# Patient Record
Sex: FEMALE | Race: WHITE | Employment: FULL TIME | URBAN - NONMETROPOLITAN AREA
[De-identification: names, ages, dates, MRNs, and addresses within clinical notes are randomized per-mention and may not be internally consistent; named-entity substitution may affect disease eponyms.]

---

## 2017-01-10 ENCOUNTER — OFFICE VISIT (OUTPATIENT)
Dept: FAMILY MEDICINE CLINIC | Age: 32
End: 2017-01-10

## 2017-01-10 VITALS
DIASTOLIC BLOOD PRESSURE: 68 MMHG | SYSTOLIC BLOOD PRESSURE: 122 MMHG | HEIGHT: 67 IN | RESPIRATION RATE: 12 BRPM | TEMPERATURE: 98.3 F | HEART RATE: 88 BPM | BODY MASS INDEX: 29.19 KG/M2 | WEIGHT: 186 LBS

## 2017-01-10 DIAGNOSIS — G43.009 MIGRAINE WITHOUT AURA AND WITHOUT STATUS MIGRAINOSUS, NOT INTRACTABLE: Primary | ICD-10-CM

## 2017-01-10 DIAGNOSIS — M79.18 MUSCLE PAIN, LUMBAR: ICD-10-CM

## 2017-01-10 PROCEDURE — 99213 OFFICE O/P EST LOW 20 MIN: CPT | Performed by: NURSE PRACTITIONER

## 2017-01-10 RX ORDER — TOPIRAMATE 25 MG/1
25 TABLET ORAL 2 TIMES DAILY
Qty: 60 TABLET | Refills: 3 | Status: SHIPPED | OUTPATIENT
Start: 2017-01-10 | End: 2017-06-05 | Stop reason: SDUPTHER

## 2017-01-10 RX ORDER — IBUPROFEN 800 MG/1
800 TABLET ORAL 2 TIMES DAILY
Qty: 60 TABLET | Refills: 3 | Status: SHIPPED | OUTPATIENT
Start: 2017-01-10 | End: 2019-07-29 | Stop reason: ALTCHOICE

## 2017-01-10 ASSESSMENT — ENCOUNTER SYMPTOMS
SCALP TENDERNESS: 0
ABDOMINAL PAIN: 0
PHOTOPHOBIA: 1
ABDOMINAL DISTENTION: 0
NAUSEA: 0
SWOLLEN GLANDS: 0
BLURRED VISION: 0
EYE PAIN: 0

## 2017-02-13 ENCOUNTER — OFFICE VISIT (OUTPATIENT)
Dept: FAMILY MEDICINE CLINIC | Age: 32
End: 2017-02-13

## 2017-02-13 VITALS
BODY MASS INDEX: 28.34 KG/M2 | HEART RATE: 76 BPM | HEIGHT: 68 IN | SYSTOLIC BLOOD PRESSURE: 106 MMHG | TEMPERATURE: 98.2 F | WEIGHT: 187 LBS | DIASTOLIC BLOOD PRESSURE: 68 MMHG | RESPIRATION RATE: 12 BRPM

## 2017-02-13 DIAGNOSIS — J06.9 VIRAL UPPER RESPIRATORY TRACT INFECTION: Primary | ICD-10-CM

## 2017-02-13 DIAGNOSIS — G44.209 TENSION-TYPE HEADACHE, NOT INTRACTABLE, UNSPECIFIED CHRONICITY PATTERN: ICD-10-CM

## 2017-02-13 DIAGNOSIS — R05.9 COUGH: ICD-10-CM

## 2017-02-13 PROCEDURE — 99213 OFFICE O/P EST LOW 20 MIN: CPT | Performed by: NURSE PRACTITIONER

## 2017-02-13 RX ORDER — LORATADINE 10 MG/1
10 TABLET ORAL DAILY
Qty: 30 TABLET | Refills: 6 | Status: SHIPPED | OUTPATIENT
Start: 2017-02-13 | End: 2017-12-05 | Stop reason: SDUPTHER

## 2017-02-13 RX ORDER — GUAIFENESIN 600 MG/1
1200 TABLET, EXTENDED RELEASE ORAL 2 TIMES DAILY
Qty: 20 TABLET | Refills: 0 | Status: SHIPPED | OUTPATIENT
Start: 2017-02-13 | End: 2019-07-29 | Stop reason: ALTCHOICE

## 2017-02-13 RX ORDER — FLUTICASONE PROPIONATE 50 MCG
1 SPRAY, SUSPENSION (ML) NASAL DAILY
Qty: 1 BOTTLE | Refills: 3 | Status: SHIPPED | OUTPATIENT
Start: 2017-02-13 | End: 2017-12-05 | Stop reason: SDUPTHER

## 2017-06-05 ENCOUNTER — OFFICE VISIT (OUTPATIENT)
Dept: FAMILY MEDICINE CLINIC | Age: 32
End: 2017-06-05

## 2017-06-05 VITALS
TEMPERATURE: 98.4 F | BODY MASS INDEX: 27.47 KG/M2 | DIASTOLIC BLOOD PRESSURE: 72 MMHG | WEIGHT: 175 LBS | SYSTOLIC BLOOD PRESSURE: 120 MMHG | RESPIRATION RATE: 16 BRPM | HEART RATE: 80 BPM | HEIGHT: 67 IN

## 2017-06-05 DIAGNOSIS — K21.9 GASTROESOPHAGEAL REFLUX DISEASE, ESOPHAGITIS PRESENCE NOT SPECIFIED: ICD-10-CM

## 2017-06-05 DIAGNOSIS — Z72.0 TOBACCO ABUSE: Primary | ICD-10-CM

## 2017-06-05 DIAGNOSIS — G43.009 MIGRAINE WITHOUT AURA AND WITHOUT STATUS MIGRAINOSUS, NOT INTRACTABLE: ICD-10-CM

## 2017-06-05 DIAGNOSIS — S63.502A LEFT WRIST SPRAIN, INITIAL ENCOUNTER: ICD-10-CM

## 2017-06-05 PROCEDURE — 99213 OFFICE O/P EST LOW 20 MIN: CPT | Performed by: NURSE PRACTITIONER

## 2017-06-05 RX ORDER — TOPIRAMATE 25 MG/1
25 TABLET ORAL DAILY
Qty: 30 TABLET | Refills: 6 | Status: SHIPPED | OUTPATIENT
Start: 2017-06-05 | End: 2017-11-09 | Stop reason: SDUPTHER

## 2017-06-05 RX ORDER — VARENICLINE TARTRATE 25 MG
KIT ORAL
Qty: 1 EACH | Refills: 0 | Status: SHIPPED | OUTPATIENT
Start: 2017-06-05 | End: 2018-03-15 | Stop reason: ALTCHOICE

## 2017-06-05 ASSESSMENT — PATIENT HEALTH QUESTIONNAIRE - PHQ9
SUM OF ALL RESPONSES TO PHQ9 QUESTIONS 1 & 2: 0
1. LITTLE INTEREST OR PLEASURE IN DOING THINGS: 0
SUM OF ALL RESPONSES TO PHQ QUESTIONS 1-9: 0
2. FEELING DOWN, DEPRESSED OR HOPELESS: 0

## 2017-06-05 ASSESSMENT — ENCOUNTER SYMPTOMS
RESPIRATORY NEGATIVE: 1
RHINORRHEA: 1
PHOTOPHOBIA: 0

## 2017-11-06 ENCOUNTER — TELEPHONE (OUTPATIENT)
Dept: FAMILY MEDICINE CLINIC | Age: 32
End: 2017-11-06

## 2017-11-06 ENCOUNTER — OFFICE VISIT (OUTPATIENT)
Dept: FAMILY MEDICINE CLINIC | Age: 32
End: 2017-11-06
Payer: COMMERCIAL

## 2017-11-06 VITALS
HEART RATE: 86 BPM | RESPIRATION RATE: 12 BRPM | TEMPERATURE: 98 F | BODY MASS INDEX: 26.84 KG/M2 | HEIGHT: 67 IN | WEIGHT: 171 LBS | SYSTOLIC BLOOD PRESSURE: 100 MMHG | DIASTOLIC BLOOD PRESSURE: 62 MMHG

## 2017-11-06 DIAGNOSIS — M54.2 NECK PAIN: ICD-10-CM

## 2017-11-06 DIAGNOSIS — G43.119 INTRACTABLE MIGRAINE WITH AURA WITHOUT STATUS MIGRAINOSUS: Primary | ICD-10-CM

## 2017-11-06 DIAGNOSIS — R11.0 NAUSEA: ICD-10-CM

## 2017-11-06 PROCEDURE — G8484 FLU IMMUNIZE NO ADMIN: HCPCS | Performed by: NURSE PRACTITIONER

## 2017-11-06 PROCEDURE — 99213 OFFICE O/P EST LOW 20 MIN: CPT | Performed by: NURSE PRACTITIONER

## 2017-11-06 PROCEDURE — G8427 DOCREV CUR MEDS BY ELIG CLIN: HCPCS | Performed by: NURSE PRACTITIONER

## 2017-11-06 PROCEDURE — 4004F PT TOBACCO SCREEN RCVD TLK: CPT | Performed by: NURSE PRACTITIONER

## 2017-11-06 PROCEDURE — G8417 CALC BMI ABV UP PARAM F/U: HCPCS | Performed by: NURSE PRACTITIONER

## 2017-11-06 RX ORDER — KETOROLAC TROMETHAMINE 30 MG/ML
30 INJECTION, SOLUTION INTRAMUSCULAR; INTRAVENOUS ONCE
Status: COMPLETED | OUTPATIENT
Start: 2017-11-06 | End: 2017-11-06

## 2017-11-06 RX ORDER — ONDANSETRON 4 MG/1
4 TABLET, FILM COATED ORAL EVERY 8 HOURS PRN
Qty: 21 TABLET | Refills: 0 | Status: SHIPPED | OUTPATIENT
Start: 2017-11-06 | End: 2019-07-12

## 2017-11-06 RX ADMIN — KETOROLAC TROMETHAMINE 30 MG: 30 INJECTION, SOLUTION INTRAMUSCULAR; INTRAVENOUS at 13:42

## 2017-11-06 NOTE — PROGRESS NOTES
Visit Information    Have you changed or started any medications since your last visit including any over-the-counter medicines, vitamins, or herbal medicines? no   Are you having any side effects from any of your medications? -  no  Have you stopped taking any of your medications? Is so, why? -  no    Have you seen any other physician or provider since your last visit? No  Have you had any other diagnostic tests since your last visit? No  Have you been seen in the emergency room and/or had an admission to a hospital since we last saw you? No  Have you had your routine dental cleaning in the past 6 months? yes    Have you activated your [employer] account? If not, what are your barriers?  Yes     Patient Care Team:  Rima Daniel CNP as PCP - General (Family Nurse Practitioner)    Medical History Review  Past Medical, Family, and Social History reviewed and does not contribute to the patient presenting condition    Health Maintenance   Topic Date Due    Pneumococcal med risk (1 of 1 - PPSV23) 05/21/2005    Flu vaccine (1) 02/13/2018 (Originally 9/1/2017)    Cervical cancer screen  02/24/2019    DTaP/Tdap/Td vaccine (2 - Td) 04/28/2026    HIV screen  Completed
Return if symptoms worsen or fail to improve. Patient instructed to go to nearest ED if symptoms worsen or she develops fever, severe neck stiffness, extremity weakness, or change in mental status.

## 2017-11-06 NOTE — LETTER
[address]  [address] [address]  Phone: [phone]  Fax: [phone]    Ekaterina Tony CNP        November 6, 2017     Patient: Santhosh Cerda   YOB: 1985   Date of Visit: 11/6/2017       To Whom It May Concern: It is my medical opinion that Ren Riley may return to full duty immediately with no restrictions. If you have any questions or concerns, please don't hesitate to call.     Sincerely,        Ekaterina Tony CNP

## 2017-11-09 ENCOUNTER — TELEPHONE (OUTPATIENT)
Dept: FAMILY MEDICINE CLINIC | Age: 32
End: 2017-11-09

## 2017-11-09 ENCOUNTER — OFFICE VISIT (OUTPATIENT)
Dept: FAMILY MEDICINE CLINIC | Age: 32
End: 2017-11-09
Payer: COMMERCIAL

## 2017-11-09 VITALS
RESPIRATION RATE: 20 BRPM | WEIGHT: 176 LBS | TEMPERATURE: 97.9 F | HEIGHT: 67 IN | BODY MASS INDEX: 27.62 KG/M2 | DIASTOLIC BLOOD PRESSURE: 62 MMHG | HEART RATE: 60 BPM | SYSTOLIC BLOOD PRESSURE: 102 MMHG

## 2017-11-09 DIAGNOSIS — G43.009 MIGRAINE WITHOUT AURA AND WITHOUT STATUS MIGRAINOSUS, NOT INTRACTABLE: ICD-10-CM

## 2017-11-09 PROCEDURE — G8427 DOCREV CUR MEDS BY ELIG CLIN: HCPCS | Performed by: NURSE PRACTITIONER

## 2017-11-09 PROCEDURE — G8484 FLU IMMUNIZE NO ADMIN: HCPCS | Performed by: NURSE PRACTITIONER

## 2017-11-09 PROCEDURE — G8417 CALC BMI ABV UP PARAM F/U: HCPCS | Performed by: NURSE PRACTITIONER

## 2017-11-09 PROCEDURE — 99213 OFFICE O/P EST LOW 20 MIN: CPT | Performed by: NURSE PRACTITIONER

## 2017-11-09 PROCEDURE — 4004F PT TOBACCO SCREEN RCVD TLK: CPT | Performed by: NURSE PRACTITIONER

## 2017-11-09 RX ORDER — KETOROLAC TROMETHAMINE 30 MG/ML
60 INJECTION, SOLUTION INTRAMUSCULAR; INTRAVENOUS ONCE
Status: COMPLETED | OUTPATIENT
Start: 2017-11-09 | End: 2017-11-09

## 2017-11-09 RX ORDER — TOPIRAMATE 25 MG/1
25 TABLET ORAL 2 TIMES DAILY
Qty: 60 TABLET | Refills: 3 | Status: SHIPPED | OUTPATIENT
Start: 2017-11-09 | End: 2018-07-11 | Stop reason: ALTCHOICE

## 2017-11-09 RX ORDER — PROMETHAZINE HYDROCHLORIDE 50 MG/ML
25 INJECTION, SOLUTION INTRAMUSCULAR; INTRAVENOUS ONCE
Status: COMPLETED | OUTPATIENT
Start: 2017-11-09 | End: 2017-11-09

## 2017-11-09 RX ORDER — METHYLPREDNISOLONE ACETATE 40 MG/ML
40 INJECTION, SUSPENSION INTRA-ARTICULAR; INTRALESIONAL; INTRAMUSCULAR; SOFT TISSUE ONCE
Status: COMPLETED | OUTPATIENT
Start: 2017-11-09 | End: 2017-11-09

## 2017-11-09 RX ADMIN — PROMETHAZINE HYDROCHLORIDE 25 MG: 50 INJECTION, SOLUTION INTRAMUSCULAR; INTRAVENOUS at 18:45

## 2017-11-09 RX ADMIN — METHYLPREDNISOLONE ACETATE 40 MG: 40 INJECTION, SUSPENSION INTRA-ARTICULAR; INTRALESIONAL; INTRAMUSCULAR; SOFT TISSUE at 18:42

## 2017-11-09 RX ADMIN — KETOROLAC TROMETHAMINE 60 MG: 30 INJECTION, SOLUTION INTRAMUSCULAR; INTRAVENOUS at 18:43

## 2017-11-09 NOTE — PROGRESS NOTES
Visit Information    Have you changed or started any medications since your last visit including any over-the-counter medicines, vitamins, or herbal medicines? no   Are you having any side effects from any of your medications? -  no  Have you stopped taking any of your medications? Is so, why? -  no    Have you seen any other physician or provider since your last visit? No  Have you had any other diagnostic tests since your last visit? No  Have you been seen in the emergency room and/or had an admission to a hospital since we last saw you? No  Have you had your routine dental cleaning in the past 6 months? no    Have you activated your [employer] account? If not, what are your barriers?  Yes     Patient Care Team:  Chrystie Harada, CNP as PCP - [facility] Nurse Practitioner)      Health Maintenance   Topic Date Due    Pneumococcal med risk (1 of 1 - PPSV23) 05/21/2005    Flu vaccine (1) 02/13/2018 (Originally 9/1/2017)    Cervical cancer screen  02/24/2019    DTaP/Tdap/Td vaccine (2 - Td) 04/28/2026    HIV screen  Completed

## 2017-11-09 NOTE — PROGRESS NOTES
Dalila Lindsay is a 33 y.o. female that presents for Headache (pt states this started a week ago, has been taking IBU and topomax, these are not helping. )      Headache    HPI:  + photophobia and phonophobia  Location - all over her head, throbbing  Duration 1.5 weeks  Inciting event? - No  Severity  8/10  History of migraines? -  Yes - and this is similar episode of migraine  Treatment tried and response - Toradol, Topamax, has gone to the chiropractor    Gets a migraine maybe 1 every few months    Fever over 100.5 No  Neck stiffness  No  Weakness of arm/leg  No  Nausea/vomiting - Yes - nausea but no vomiting  \"Worst headache ever\" - Yes  Confusion - No    PHYSICAL EXAM:    Vitals:    11/09/17 1753   BP: 102/62   Pulse: 60   Resp: 20   Temp: 97.9 °F (36.6 °C)     GEN:  NAD  HEENT:  NCAT, PERRL, EOMI, Nares clear, turbinates pink, mucosa is moist.  Oropharynx  is clear. Hearing grossly intact. Dentition is normal for age. Neck: No lymphadenopathy or masses. No neck stiffness noted   Heart: RRR. S1 and S2 normal, no murmurs, clicks, gallops or rubs. Lungs:  CTAB,  . Abdomen:  Soft, non tender, non distended. No rebound or guarding. No organomegaly. Extremities:  No gross deformity, erythema or edema of the lower extremities. Skin: No pathologic lesions or significant rash. Neurological:  A&Ox3. CN 2-12 grossly intact. 5/5 strength globally and symmetrically. Cerebellar testing wnl. 2+ patellar reflexes b/l    ASSESSMENT & PLAN  [facility] was seen today for headache. Diagnoses and all orders for this visit:    Migraine without aura and without status migrainosus, not intractable  -     topiramate (TOPAMAX) 25 MG tablet;  Take 1 tablet by mouth 2 times daily  -     methylPREDNISolone acetate (DEPO-MEDROL) injection 40 mg; Inject 1 mL into the muscle once  -     ketorolac (TORADOL) injection 60 mg; Inject 2 mLs into the muscle once  -     promethazine (PHENERGAN) injection 25 mg; Inject 0.5 mLs into the muscle once    will increase Topamax to 25 mg bid for starters, may benefit from further increase  Migraine cocktail  ER precautions if her headache persists and/or worsens  Cautioned about driving, medication SE advised    Return if symptoms worsen or fail to improve. Patient instructed to go to nearest ED if symptoms worsen or she develops fever, severe neck stiffness, extremity weakness, or change in mental status.

## 2017-11-09 NOTE — PROGRESS NOTES
After obtaining consent, and per orders of Aravind Malik, injection of Phenergan 25 mg I.M. given in left Buttocks by Jose Francisco Villela LPN. Patient instructed to report any adverse reaction to me immediately.

## 2017-11-09 NOTE — PROGRESS NOTES
After obtaining consent, and per orders of [facility] , injection of Depo medrol 40 mg I.M. given in right buttocks  by Andrew Xiao LPN. Patient instructed to report any adverse reaction to me immediately. Dayna Merlos

## 2017-12-05 ENCOUNTER — OFFICE VISIT (OUTPATIENT)
Dept: FAMILY MEDICINE CLINIC | Age: 32
End: 2017-12-05
Payer: COMMERCIAL

## 2017-12-05 VITALS
HEIGHT: 67 IN | WEIGHT: 178.8 LBS | TEMPERATURE: 97.4 F | RESPIRATION RATE: 12 BRPM | DIASTOLIC BLOOD PRESSURE: 62 MMHG | SYSTOLIC BLOOD PRESSURE: 110 MMHG | HEART RATE: 92 BPM | BODY MASS INDEX: 28.06 KG/M2

## 2017-12-05 DIAGNOSIS — J06.9 VIRAL UPPER RESPIRATORY TRACT INFECTION: ICD-10-CM

## 2017-12-05 DIAGNOSIS — G43.809 OTHER MIGRAINE WITHOUT STATUS MIGRAINOSUS, NOT INTRACTABLE: Primary | ICD-10-CM

## 2017-12-05 DIAGNOSIS — R05.9 COUGH: ICD-10-CM

## 2017-12-05 PROCEDURE — G8427 DOCREV CUR MEDS BY ELIG CLIN: HCPCS | Performed by: NURSE PRACTITIONER

## 2017-12-05 PROCEDURE — 99213 OFFICE O/P EST LOW 20 MIN: CPT | Performed by: NURSE PRACTITIONER

## 2017-12-05 PROCEDURE — 4004F PT TOBACCO SCREEN RCVD TLK: CPT | Performed by: NURSE PRACTITIONER

## 2017-12-05 PROCEDURE — G8484 FLU IMMUNIZE NO ADMIN: HCPCS | Performed by: NURSE PRACTITIONER

## 2017-12-05 PROCEDURE — G8417 CALC BMI ABV UP PARAM F/U: HCPCS | Performed by: NURSE PRACTITIONER

## 2017-12-05 RX ORDER — LORATADINE 10 MG/1
10 TABLET ORAL DAILY
Qty: 30 TABLET | Refills: 6 | Status: SHIPPED | OUTPATIENT
Start: 2017-12-05 | End: 2020-02-13 | Stop reason: ALTCHOICE

## 2017-12-05 RX ORDER — FLUTICASONE PROPIONATE 50 MCG
1 SPRAY, SUSPENSION (ML) NASAL DAILY
Qty: 1 BOTTLE | Refills: 3 | Status: SHIPPED | OUTPATIENT
Start: 2017-12-05 | End: 2019-07-12

## 2017-12-05 ASSESSMENT — ENCOUNTER SYMPTOMS
COUGH: 1
CHOKING: 0
SHORTNESS OF BREATH: 0
CHEST TIGHTNESS: 0
WHEEZING: 0
RHINORRHEA: 1

## 2017-12-05 NOTE — PROGRESS NOTES
[address]  [address] [address]  Dept: [phone]  Dept Fax: [phone]  Loc: Jihan Bosch is a 33 y.o. female who presents today for her medical conditions/complaints as noted below. Joce Merida is c/o of 6 Month Follow-Up and Medication Refill      HPI:     Pt here for a 6 month check up. She states her migraine headaches are under control. She is taking topamax bid and hs no suzy a headache since earlier this month. She denies dizziness or vision changes. She has an upper respiratory infection with nasal congestion and drainage she takes claritin and flonass for these symptoms and  They work well. She started the chantix but stopped it after 4-5 weeks. She does continue to smoke  But is going to stop on her own.          Current Outpatient Prescriptions   Medication Sig Dispense Refill    loratadine (CLARITIN) 10 MG tablet Take 1 tablet by mouth daily 30 tablet 6    fluticasone (FLONASE) 50 MCG/ACT nasal spray 1 spray by Nasal route daily 1 Bottle 3    topiramate (TOPAMAX) 25 MG tablet Take 1 tablet by mouth 2 times daily 60 tablet 3    ondansetron (ZOFRAN) 4 MG tablet Take 1 tablet by mouth every 8 hours as needed for Nausea or Vomiting 21 tablet 0    Elastic Bandages & Supports (ACE WRIST BRACE/SPLINT) MISC 1 Device by Does not apply route continuous prn (prn comfort) 1 each 0    guaiFENesin (MUCINEX) 600 MG extended release tablet Take 2 tablets by mouth 2 times daily 20 tablet 0    ibuprofen (ADVIL;MOTRIN) 800 MG tablet Take 1 tablet by mouth 2 times daily 60 tablet 3    ondansetron (ZOFRAN) 4 MG tablet Take 1 tablet by mouth every 8 hours as needed for Nausea or Vomiting 21 tablet 0    ibuprofen (ADVIL;MOTRIN) 800 MG tablet Take 1 tablet by mouth every 6 hours as needed for Pain 120 tablet 0    melatonin 3 MG TABS tablet Take 3 mg by mouth daily      varenicline (CHANTIX STARTING MONTH PAK) 0.5 MG X 11 & 1 MG X 42 tablet Take by mouth. 1 each 0     No current facility-administered medications for this visit. Past Medical History:   Diagnosis Date    Abnormal Pap smear and cervical HPV (human papillomavirus)     Anxiety     Asthma     Depression     Smoker     Substance abuse     history of    Tonsillitis     recurrent      Past Surgical History:   Procedure Laterality Date    DENTAL SURGERY      FOOT SURGERY Right 08/26/14    TUBAL LIGATION  1/2012     Family History   Problem Relation Age of Onset    Cancer Mother      ovarian in mother  , paternal grandmother- breast cancer    Alcohol Abuse Other       family     Social History   Substance Use Topics    Smoking status: Current Every Day Smoker     Packs/day: 0.50     Years: 10.00     Types: Cigarettes     Last attempt to quit: 12/15/2015    Smokeless tobacco: Never Used    Alcohol use 0.6 oz/week     1 Glasses of wine per week        Allergies   Allergen Reactions    Augmentin [Amoxicillin-Pot Clavulanate] Swelling    Bee Venom Hives    Bee Pollen Swelling and Rash       Health Maintenance   Topic Date Due    Pneumococcal med risk (1 of 1 - PPSV23) 05/21/2005    Flu vaccine (1) 02/13/2018 (Originally 9/1/2017)    Cervical cancer screen  02/24/2019    DTaP/Tdap/Td vaccine (2 - Td) 04/28/2026    HIV screen  Completed       Subjective:      Review of Systems   Constitutional: Negative for chills, fatigue and fever. HENT: Positive for congestion and rhinorrhea. Respiratory: Positive for cough. Negative for choking, chest tightness, shortness of breath and wheezing. Cardiovascular: Negative. Genitourinary: Negative for difficulty urinating and dysuria. Skin: Negative. Neurological: Negative for dizziness, weakness and headaches. Psychiatric/Behavioral: Negative for self-injury, sleep disturbance and suicidal ideas.        Objective:     /62 (Site: Left Arm, Position: Sitting, Cuff Size: Medium Adult)

## 2018-03-15 ENCOUNTER — HOSPITAL ENCOUNTER (EMERGENCY)
Age: 33
Discharge: HOME | End: 2018-03-15
Payer: COMMERCIAL

## 2018-03-15 VITALS
SYSTOLIC BLOOD PRESSURE: 114 MMHG | WEIGHT: 181 LBS | OXYGEN SATURATION: 97 % | BODY MASS INDEX: 28.41 KG/M2 | HEIGHT: 67 IN | HEART RATE: 78 BPM | RESPIRATION RATE: 16 BRPM | DIASTOLIC BLOOD PRESSURE: 80 MMHG | TEMPERATURE: 98 F

## 2018-03-15 DIAGNOSIS — J06.9 VIRAL URI WITH COUGH: Primary | ICD-10-CM

## 2018-03-15 LAB
FLU A ANTIGEN: NEGATIVE
FLU B ANTIGEN: NEGATIVE

## 2018-03-15 PROCEDURE — 99213 OFFICE O/P EST LOW 20 MIN: CPT | Performed by: NURSE PRACTITIONER

## 2018-03-15 PROCEDURE — 87804 INFLUENZA ASSAY W/OPTIC: CPT

## 2018-03-15 PROCEDURE — 99213 OFFICE O/P EST LOW 20 MIN: CPT

## 2018-03-15 RX ORDER — BENZONATATE 100 MG/1
100 CAPSULE ORAL 3 TIMES DAILY PRN
Qty: 21 CAPSULE | Refills: 0 | Status: SHIPPED | OUTPATIENT
Start: 2018-03-15 | End: 2018-03-22

## 2018-03-15 ASSESSMENT — ENCOUNTER SYMPTOMS
WHEEZING: 0
SORE THROAT: 1
ABDOMINAL PAIN: 0
NAUSEA: 0
VOMITING: 0
COUGH: 1
APNEA: 0
BACK PAIN: 0
RHINORRHEA: 1
PHOTOPHOBIA: 0
DIARRHEA: 0
SINUS PRESSURE: 0
SHORTNESS OF BREATH: 0
CONSTIPATION: 0

## 2018-03-15 NOTE — ED PROVIDER NOTES
[id number]  Urgent Care Encounter      CHIEF COMPLAINT       Chief Complaint   Patient presents with    Sinusitis     sore throat       Nurses Notes reviewed and I agree except as noted in the HPI. HISTORY OF PRESENT Hero Prasad is a 33 y.o. female who presents with cough, sore throat, and nasal congestion. This has been going on for two days. She states that her daughter tested positive for Influenza B 2 days ago and she wanted to be sure that she does not have it as well. Requesting a flu test.  No fever or chills. States she has allergies and it may be this and not flu. Cough is non-productive and makes it difficult to sleep. REVIEW OF SYSTEMS     Review of Systems   Constitutional: Negative for appetite change, chills, fatigue and fever. HENT: Positive for congestion, rhinorrhea and sore throat. Negative for ear pain and sinus pressure. Eyes: Negative for photophobia. Respiratory: Positive for cough. Negative for apnea, shortness of breath and wheezing. Cardiovascular: Negative for chest pain and leg swelling. Gastrointestinal: Negative for abdominal pain, constipation, diarrhea, nausea and vomiting. Genitourinary: Negative for difficulty urinating. Musculoskeletal: Negative for back pain and neck stiffness. Neurological: Negative for dizziness, weakness and light-headedness. PAST MEDICAL HISTORY         Diagnosis Date    Abnormal Pap smear and cervical HPV (human papillomavirus)     Anxiety     Asthma     Depression     Smoker     Substance abuse     history of    Tonsillitis     recurrent       SURGICAL HISTORY     Patient  has a past surgical history that includes Dental surgery; Tubal ligation (1/2012); and Foot surgery (Right, 08/26/14).     CURRENT MEDICATIONS       Discharge Medication List as of 3/15/2018  1:12 PM      CONTINUE these medications which have NOT CHANGED    Details   loratadine (CLARITIN) 10 MG tablet Take 1

## 2018-05-21 ENCOUNTER — TELEPHONE (OUTPATIENT)
Dept: FAMILY MEDICINE CLINIC | Age: 33
End: 2018-05-21

## 2018-07-11 ENCOUNTER — OFFICE VISIT (OUTPATIENT)
Dept: FAMILY MEDICINE CLINIC | Age: 33
End: 2018-07-11
Payer: COMMERCIAL

## 2018-07-11 ENCOUNTER — TELEPHONE (OUTPATIENT)
Dept: FAMILY MEDICINE CLINIC | Age: 33
End: 2018-07-11

## 2018-07-11 VITALS
SYSTOLIC BLOOD PRESSURE: 104 MMHG | WEIGHT: 179.6 LBS | HEIGHT: 67 IN | DIASTOLIC BLOOD PRESSURE: 66 MMHG | TEMPERATURE: 98.3 F | RESPIRATION RATE: 16 BRPM | HEART RATE: 88 BPM | BODY MASS INDEX: 28.19 KG/M2

## 2018-07-11 DIAGNOSIS — Z71.6 ENCOUNTER FOR TOBACCO USE CESSATION COUNSELING: Primary | ICD-10-CM

## 2018-07-11 DIAGNOSIS — F33.41 RECURRENT MAJOR DEPRESSIVE DISORDER, IN PARTIAL REMISSION (HCC): Primary | ICD-10-CM

## 2018-07-11 DIAGNOSIS — R45.4 ANGER: ICD-10-CM

## 2018-07-11 DIAGNOSIS — Z72.0 TOBACCO ABUSE: ICD-10-CM

## 2018-07-11 PROCEDURE — G8417 CALC BMI ABV UP PARAM F/U: HCPCS | Performed by: NURSE PRACTITIONER

## 2018-07-11 PROCEDURE — 99213 OFFICE O/P EST LOW 20 MIN: CPT | Performed by: NURSE PRACTITIONER

## 2018-07-11 PROCEDURE — G8427 DOCREV CUR MEDS BY ELIG CLIN: HCPCS | Performed by: NURSE PRACTITIONER

## 2018-07-11 PROCEDURE — 4004F PT TOBACCO SCREEN RCVD TLK: CPT | Performed by: NURSE PRACTITIONER

## 2018-07-11 PROCEDURE — 96160 PT-FOCUSED HLTH RISK ASSMT: CPT | Performed by: NURSE PRACTITIONER

## 2018-07-11 RX ORDER — AMITRIPTYLINE HYDROCHLORIDE 10 MG/1
10 TABLET, FILM COATED ORAL NIGHTLY
COMMUNITY
End: 2020-02-13 | Stop reason: ALTCHOICE

## 2018-07-11 RX ORDER — ESCITALOPRAM OXALATE 10 MG/1
10 TABLET ORAL DAILY
Qty: 30 TABLET | Refills: 3 | Status: SHIPPED | OUTPATIENT
Start: 2018-07-11 | End: 2018-08-29 | Stop reason: ALTCHOICE

## 2018-07-11 RX ORDER — BUPROPION HYDROCHLORIDE 150 MG/1
150 TABLET ORAL EVERY MORNING
Qty: 30 TABLET | Refills: 3 | Status: SHIPPED | OUTPATIENT
Start: 2018-07-11 | End: 2018-07-12 | Stop reason: SDUPTHER

## 2018-07-11 RX ORDER — IBUPROFEN 800 MG/1
800 TABLET ORAL EVERY 6 HOURS PRN
Qty: 120 TABLET | Refills: 0 | Status: ON HOLD | OUTPATIENT
Start: 2018-07-11 | End: 2019-08-06 | Stop reason: HOSPADM

## 2018-07-11 RX ORDER — NORGESTIMATE AND ETHINYL ESTRADIOL 0.25-0.035
1 KIT ORAL DAILY
COMMUNITY
End: 2019-07-12

## 2018-07-11 ASSESSMENT — PATIENT HEALTH QUESTIONNAIRE - PHQ9
SUM OF ALL RESPONSES TO PHQ9 QUESTIONS 1 & 2: 6
1. LITTLE INTEREST OR PLEASURE IN DOING THINGS: 3
2. FEELING DOWN, DEPRESSED OR HOPELESS: 3
4. FEELING TIRED OR HAVING LITTLE ENERGY: 3
10. IF YOU CHECKED OFF ANY PROBLEMS, HOW DIFFICULT HAVE THESE PROBLEMS MADE IT FOR YOU TO DO YOUR WORK, TAKE CARE OF THINGS AT HOME, OR GET ALONG WITH OTHER PEOPLE: 3
7. TROUBLE CONCENTRATING ON THINGS, SUCH AS READING THE NEWSPAPER OR WATCHING TELEVISION: 0
9. THOUGHTS THAT YOU WOULD BE BETTER OFF DEAD, OR OF HURTING YOURSELF: 0
6. FEELING BAD ABOUT YOURSELF - OR THAT YOU ARE A FAILURE OR HAVE LET YOURSELF OR YOUR FAMILY DOWN: 2
5. POOR APPETITE OR OVEREATING: 3
8. MOVING OR SPEAKING SO SLOWLY THAT OTHER PEOPLE COULD HAVE NOTICED. OR THE OPPOSITE, BEING SO FIGETY OR RESTLESS THAT YOU HAVE BEEN MOVING AROUND A LOT MORE THAN USUAL: 0
SUM OF ALL RESPONSES TO PHQ QUESTIONS 1-9: 17
3. TROUBLE FALLING OR STAYING ASLEEP: 3

## 2018-07-11 NOTE — TELEPHONE ENCOUNTER
Received P.A. Today for buPROPion (WELLBUTRIN XL) 150 MG extended release tablet    Not  Sure what ICD 10  From office visit today that we are using for the RX ? No ICD 10 on the RX . Please verify     P. A paper in phone 2 tray

## 2018-07-12 RX ORDER — BUPROPION HYDROCHLORIDE 150 MG/1
150 TABLET ORAL EVERY MORNING
Qty: 30 TABLET | Refills: 3 | Status: SHIPPED | OUTPATIENT
Start: 2018-07-12 | End: 2018-09-24

## 2018-07-12 ASSESSMENT — ENCOUNTER SYMPTOMS: RESPIRATORY NEGATIVE: 1

## 2018-07-12 NOTE — PROGRESS NOTES
[address] [place] [address]  Dept: [phone]  Dept Fax: [phone]  Loc: Jaz Momin is a 29 y.o. female who presents today for her medical conditions/complaints as noted below. Amy Funez is c/o of Depression (Pt is c/o problems with depression and anger which she said has had for a long time, but she has noticed it has gotted worse as she has had added anxiety and stress recently. )      HPI:     Pt here to discuss her moods. She has battled with depression for many years. She has tried medications off and on. She notes that lately her  states her moods have been getting worse, she is lashing out verbally, she is always angry. Pt states she did not realize this, but does note she has had a lack of motivation and energy lately. She would prefer to sit on her cough all day and watch TV. She did quit her job last year as it was too stressful. She denies panic attacks. She denies thoughts of hurting herself or others. She states she was on zoloft once and became angrier. She has been place don elavil recently per gyn for bladder pains. She is smoking 4 cigarettes per day and would like to stop, the patches do not work they give her a rash.          Current Outpatient Prescriptions   Medication Sig Dispense Refill    ibuprofen (ADVIL;MOTRIN) 800 MG tablet Take 1 tablet by mouth every 6 hours as needed for Pain 120 tablet 0    amitriptyline (ELAVIL) 10 MG tablet Take 10 mg by mouth nightly      lidocaine viscous (XYLOCAINE) 2 % solution Take 15 mLs by mouth as needed for Irritation      norgestimate-ethinyl estradiol (MONO-LINYAH) 0.25-35 MG-MCG per tablet Take 1 tablet by mouth daily      escitalopram (LEXAPRO) 10 MG tablet Take 1 tablet by mouth daily 30 tablet 3    loratadine (CLARITIN) 10 MG tablet Take 1 tablet by mouth daily 30 tablet 6    fluticasone (FLONASE) 50 MCG/ACT nasal spray 1

## 2018-07-12 NOTE — TELEPHONE ENCOUNTER
[facility] calling to inform office pa is not necessarily for med itself but for the fact that the ins only pays for 2 psych meds per month and this would have her at more than that.

## 2018-07-13 ENCOUNTER — TELEPHONE (OUTPATIENT)
Dept: FAMILY MEDICINE CLINIC | Age: 33
End: 2018-07-13

## 2018-07-13 DIAGNOSIS — Z71.6 ENCOUNTER FOR TOBACCO USE CESSATION COUNSELING: Primary | ICD-10-CM

## 2018-07-13 NOTE — TELEPHONE ENCOUNTER
Please let the pt know this was denied and ask if she is open to any other medications for smoking cessation. She is not currently a candidate for chantix, has she ever tried the gum?

## 2018-08-14 ENCOUNTER — OFFICE VISIT (OUTPATIENT)
Dept: PSYCHOLOGY | Age: 33
End: 2018-08-14
Payer: COMMERCIAL

## 2018-08-14 DIAGNOSIS — F33.1 MAJOR DEPRESSIVE DISORDER, RECURRENT EPISODE, MODERATE WITH ANXIOUS DISTRESS (HCC): Primary | ICD-10-CM

## 2018-08-14 PROCEDURE — 90791 PSYCH DIAGNOSTIC EVALUATION: CPT | Performed by: PSYCHOLOGIST

## 2018-08-14 NOTE — PATIENT INSTRUCTIONS
1.  Schedule two potentially enjoyable activities in the next couple of weeks (some examples):    -Going out to eat  -Going to see a movie  -Spend time with family/friends  -Read  -Get to the gym  -Get outside to do some walking      2. Some tips about overcoming depression are presented below. 3.  I would recommend getting into a sleep schedule where you go to bed and get up about the same time each day. 4.  You've got to focus on self-care (i.e., sleep, activities you enjoy, exercise, etc.) so that you can continue to take care of others. 5.  Try the Stop, Breathe & Think giancarlo to help slow life down for you. 6.  Return to see Dr. Andrei Paulino in 4-6 weeks. OVERCOMING DEPRESSION    This booklet is designed to provide information about strategies for overcoming depression. It discusses a model or framework for understanding depression (the depression spiral) and presents an overview of treatment of depression, including use of medication and strategic coping strategies. The booklet is designed to be used with the assistance of your Behavioral Health Consultant. The Depression Spiral    The figure below depicts one helpful way to think about and understand depression. Our life experience (including depression) is influenced by a number of interrelated factors: our environment, biological factors, our thoughts and beliefs, our behaviors, and our emotions. Each factor can affect the others. For example, Jeannie Marcos recently began working in a fast-paced, high-pressure job (environmental factor). She began to have thoughts such as Theres no way I can get all this work done. Its impossible. If I dont get it done, I may lose my job.   As a result, she began to work longer hours, cut out all fun activities, and withdraw from family and friends (behaviors).   With this decrease in many of the positive, rewarding aspects of her life, she began to feel down, depressed, and more irritable behavioral and biological factor that may help decrease depression), recognizing and changing thought patterns that contribute to depression or worry (thoughts), or learning and trying out new behaviors to improve work or family situations (behaviors, e.g., problem-solving, effective communication, time management, etc.). As you can see, there are a variety of coping methods and behavioral strategies that may be helpful in decreasing depression. It is probably not in your best interests to try all or too many strategies at any one time. Rather, keep it simple and do not overwhelm yourself. It is usually best to pick one or two strategies that sound most relevant to you, try those coping strategies for a few weeks or longer, and then move on to other coping strategies that you think may be important later on. And remember -- even if you are just working directly on one or two coping strategies, you will probably be having an indirect positive effect on other areas. Your Behavioral Health Consultant [facility]) can work with you to develop skills in some of the most effective strategies for breaking the depression spiral and decreasing depression. Four effective strategies include:    __X___  a. Increasing rewarding activities    __X___  b. Taking antidepressant medication as directed by PCP     __X___  c. Increasing physical exercise     __X___  d. Increasing balanced thinking     Talk with your [facility] about which of the above you are interested in working on to better manage your depression. Start small and stay focused  The key to depression recovery is to start with a few small goals and slowly build from there. Draw upon whatever resources you have. You may not have much energy, but you probably have enough to take a short walk around the block or  the phone to call a loved one. Take things one day at a time and reward yourself for each accomplishment.  The steps may seem small, but theyll quickly add up. And for all the energy you put into your depression recovery, youll get back much more in return. Depression self-help tip 1: Cultivate supportive relationships  Getting the support you need plays a big role in lifting the fog of depression and keeping it away. On your own, it can be difficult to maintain perspective and sustain the effort required to beat depression, but the very nature of depression makes it difficult to reach out for help. While isolation and loneliness can trigger or worsen depression, maintaining emotionally close relationships can be instrumental in overcoming it. The thought of reaching out to even close family members and friends can seem overwhelming. You may feel ashamed, too exhausted to talk, or guilty for neglecting the relationship. Remind yourself that this is the depression talking. Reaching out is not a sign of weakness and it wont mean youre a burden to others. Your loved ones care about you and want to help. And remember, its never too late to build new friendships and improve your support network. Turn to friends and family members who make you feel loved and cared for. Spend time talking and listening face-to-face with trusted people and share what youre going through. The people you talk to dont have to be able to fix you; they just need to be good listeners. Ask for the help and support you need. You may have retreated from your most treasured relationships, but emotional connection can get you through this tough time. Try to keep up with social activities even if you dont feel like it. Often when youre depressed, it feels more comfortable to retreat into your shell, but being around other people will make you feel less depressed. Join a support group for depression. Being with others dealing with depression can go a long way in reducing your sense of isolation.  You can also encourage each other, give and receive advice on how to cope, and share your experiences. Depression self-help tip 2: Get moving  When youre depressed, just getting out of bed can seem like a daunting task, let alone exercising. But exercise is a powerful tool for dealing with depression. In fact, major studies show that regular exercise can be as effective as antidepressant medication at increasing energy levels and decreasing feelings of fatigue. Evidence suggests that physical activity triggers new cell growth in the brain, increases mood-enhancing neurotransmitters and endorphins, reduces stress, and relieves muscle tensionall things that can have a positive effect on depression. While the most benefits come from exercising 30 minutes or more per day, you can start small. Short, 10-minute bursts of activity can have a positive effect on your mood. You dont need to train at the gym, sweat buckets, or run mile after mile, either. Even very small activities that get your arms and legs moving can add up over the course of a day. Try incorporating walking, running, swimming, dancing or another rhythmic exercisethat requires moving both your arms and legsinto your daily routine. The key is to pick an activity you enjoy, so youre more likely to stick with it. Even very small activities can add up over the course of a day. Here are a few easy ways to get moving:  Put on some music and dance around   Take your dog for a walk   Use the stairs rather than an elevator   Park your car in the farthest spot in the lot   Pair up with an exercise partner       Depression self-help tip 3: Challenge negative thinking  Depression puts a negative spin on everything, including the way you see yourself, the situations you encounter, and your expectations for the future. Living with depression is like living with blinders on; we have \"tunnel vision\" for the negative things in life, and are shielded from seeing any positives around us.     But you cant break out of this pessimistic mind frame by just thinking positive.  Happy thoughts or wishful thinking wont cut it. Rather, the trick is to replace negative thoughts with more balanced thoughts. Ways to challenge negative thinking: Think outside yourself. Ask yourself if youd say what youre thinking about yourself to someone else. If not, stop being so hard on yourself. Think about less harsh statements that offer more realistic descriptions. Allow yourself to be less than perfect. Many depressed people are perfectionists, holding themselves to impossibly high standards and then beating themselves up when they fail to meet them. Stafford this source of self-imposed stress by challenging your negative ways of thinking   Socialize with positive people. Notice how people who always look on the bright side deal with challenges, even minor ones, like not being able to find a parking space. Then consider how you would react in the same situation. Even if you have to pretend, try to adopt their optimism and persistence in the face of difficulty. Keep a \"negative thought log. \" Whenever you experience a negative thought, jot down the thought and what triggered it in a notebook. Review your log when youre in a good mood. Consider if the negativity was truly warranted. Ask yourself if theres another way to view the situation. For example, lets say your boyfriend was short with you and you automatically assumed that the relationship was in trouble. It's possible, though, hes just having a bad day. Depression self-help tip 4: Do things that make you feel good  In order to overcome depression, you have to do things that relax and energize you. This includes following a healthy lifestyle, learning how to better manage stress, setting limits on what youre able to do, adopting healthy habits, and scheduling fun activities into your day. Aim for eight hours of sleep. Depression typically involves sleep problems.  Whether youre sleeping too little or too much, your mood suffers. Get on a better sleep schedule by learning healthy sleep habits. Expose yourself to a little sunlight every day. Lack of sunlight can make depression worse. Make sure youre getting enough. Take a short walk outdoors, have your coffee outside, enjoy an al fresco meal, people-watch on a park bench, or sit out in the garden. Aim for at least 15 minutes of sunlight a day to boost your mood. If you live somewhere with little winter sunshine, try using a light therapy box. Practice relaxation techniques. A daily relaxation practice can help relieve symptoms of depression, reduce stress, and boost feelings of mayra and well-being. Try yoga, deep breathing, progressive muscle relaxation, or meditation. Care for a pet. While nothing can replace the human connection, pets can bring mayra and companionship into your life and help you feel less isolated. Caring for a pet can also get you outside of yourself and give you a sense of being neededboth powerful antidotes to depression. Do things you enjoy (or used to)  While you cant force yourself to have fun or experience pleasure, you can choose to do things that you used to enjoy.  a former hobby or a sport you used to like. Express yourself creatively through music, art, or writing. Go out with friends. Take a day trip to a museum, the [address] , or the [employer]. Push yourself to do things, even when you dont feel like it. You might be surprised at how much better you feel once youre out in the world. Even if your depression doesnt lift immediately, youll gradually feel more upbeat and energetic as you make time for fun activities. Depression self-help tip 5: Eat a healthy, mood-boosting diet  What you eat has a direct impact on the way you feel. Aim for a balanced diet of low-fat protein, complex carbohydrates, fruits and vegetables.  Reduce your intake of foods that can adversely affect your brain and mood, such as

## 2018-08-14 NOTE — PROGRESS NOTES
to eat  -Going to see a movie  -Spend time with family/friends  -Read  -Get to the gym  -Get outside to do some walking      2. Some tips about overcoming depression are presented below. 3.  I would recommend getting into a sleep schedule where you go to bed and get up about the same time each day. 4.  You've got to focus on self-care (i.e., sleep, activities you enjoy, exercise, etc.) so that you can continue to take care of others. 5.  Try the Stop, Breathe & Think giancarlo to help slow life down for you. 6.  Return to see Dr. Ayla Reyes in 4-6 weeks. All questions about treatment plan answered. Patient instructed to go immediately to the emergency room and/or call 911 if any suicidal or homicidal ideations. Patient stated understanding and is agreeable to treatment and crisis plan. Provider Signature:  Electronically signed by CRISTINA Higgins on 8/14/2018 at 12:17 PM

## 2018-08-29 ENCOUNTER — OFFICE VISIT (OUTPATIENT)
Dept: FAMILY MEDICINE CLINIC | Age: 33
End: 2018-08-29
Payer: COMMERCIAL

## 2018-08-29 VITALS
HEIGHT: 68 IN | RESPIRATION RATE: 18 BRPM | HEART RATE: 87 BPM | SYSTOLIC BLOOD PRESSURE: 130 MMHG | WEIGHT: 183 LBS | BODY MASS INDEX: 27.74 KG/M2 | DIASTOLIC BLOOD PRESSURE: 82 MMHG | TEMPERATURE: 98.7 F

## 2018-08-29 DIAGNOSIS — F32.9 REACTIVE DEPRESSION: ICD-10-CM

## 2018-08-29 DIAGNOSIS — R45.86 MOOD CHANGE: Primary | ICD-10-CM

## 2018-08-29 PROCEDURE — G8417 CALC BMI ABV UP PARAM F/U: HCPCS | Performed by: NURSE PRACTITIONER

## 2018-08-29 PROCEDURE — 4004F PT TOBACCO SCREEN RCVD TLK: CPT | Performed by: NURSE PRACTITIONER

## 2018-08-29 PROCEDURE — 99213 OFFICE O/P EST LOW 20 MIN: CPT | Performed by: NURSE PRACTITIONER

## 2018-08-29 PROCEDURE — G8427 DOCREV CUR MEDS BY ELIG CLIN: HCPCS | Performed by: NURSE PRACTITIONER

## 2018-08-29 RX ORDER — DIVALPROEX SODIUM 250 MG/1
250 TABLET, EXTENDED RELEASE ORAL DAILY
Qty: 30 TABLET | Refills: 3 | Status: SHIPPED | OUTPATIENT
Start: 2018-08-29 | End: 2018-09-24

## 2018-08-29 NOTE — PATIENT INSTRUCTIONS
SAD, symptoms come during the winter when there is less daylight. You may:  · Feel sad, grumpy, rincon, or anxious. · Lose interest in your usual activities. · Eat more and crave carbohydrates, such as bread and pasta. · Gain weight. · Sleep more and feel drowsy during the daytime. How are mood disorders treated? Mood disorders can be treated with medicines or counseling, or a combination of both. Medicines for depression and SAD may include antidepressants. Medicines for bipolar disorder may include:  · Mood stabilizers. · Antipsychotics. · Benzodiazepines. Counseling may involve cognitive-behavioral therapy. It teaches you how to change the ways you think and behave. This can help you stop thinking bad thoughts about yourself and your life. Light therapy is the main treatment for SAD. This therapy uses a special kind of lamp. You let the lamp shine on you at certain times, usually in the morning. This may help your symptoms during the months when there is less sunlight. Healthy lifestyle  Healthy lifestyle changes may help you feel better. · Get at least 30 minutes of exercise on most days of the week. Walking is a good choice. · Eat a healthy diet. Include fruits, vegetables, lean proteins, and whole grains in your diet each day. · Keep a regular sleep schedule. Try for 8 hours of sleep a night. · Find ways to manage stress, such as relaxation exercises. · Avoid alcohol and illegal drugs. Follow-up care is a key part of your treatment and safety. Be sure to make and go to all appointments, and call your doctor if you are having problems. It's also a good idea to know your test results and keep a list of the medicines you take. Where can you learn more? Go to https://[place].[employer]. org and sign in to your [employer] account. Enter X263 in the [employer] box to learn more about \"Learning About Mood Disorders. \"     If you do not have an account, please click on the \"Sign pregnant. Tell your doctor if you start or stop using hormonal contraception that contains estrogen (birth control pills, injections, implants, skin patches, and vaginal rings). Estrogen can interact with divalproex sodium and make it less effective in preventing seizures. Seizure control is very important during pregnancy. The benefit of preventing seizures may outweigh any risks posed by taking divalproex sodium. There may be other seizure medications that can be more safely used during pregnancy. Follow your doctor's instructions about taking divalproex sodium while you are pregnant. Divalproex sodium can pass into breast milk and may harm a nursing baby. Tell your doctor if you are breast-feeding a baby. How should I take divalproex sodium? Follow all directions on your prescription label. Your doctor may occasionally change your dose to make sure you get the best results. Do not take this medicine in larger or smaller amounts or for longer than recommended. Drink plenty of water while you are taking this medication. Your dose may need to be changed if you do not get enough fluids each day. You may open the divalproex sodium sprinkle capsule  and sprinkle the medicine into a spoonful of pudding or applesauce to make swallowing easier. Swallow this mixture right away. Do not crush, chew, break, or open a delayed-release or extended-release tablet or capsule. Swallow it whole. While using divalproex sodium, you may need frequent blood tests. Wear a medical alert tag or carry an ID card stating that you take divalproex sodium. Any doctor, dentist, or emergency medical care provider who treats you should know that you are taking a seizure medication. If you need surgery, tell the surgeon ahead of time that you are using divalproex sodium. Do not stop using divalproex sodium suddenly, even if you feel fine. Stopping suddenly may cause a serious, life-threatening type of seizure.  Follow your doctor's

## 2018-09-20 ENCOUNTER — OFFICE VISIT (OUTPATIENT)
Dept: BEHAVIORAL/MENTAL HEALTH CLINIC | Age: 33
End: 2018-09-20
Payer: COMMERCIAL

## 2018-09-20 DIAGNOSIS — F33.1 MAJOR DEPRESSIVE DISORDER, RECURRENT EPISODE, MODERATE WITH ANXIOUS DISTRESS (HCC): Primary | ICD-10-CM

## 2018-09-20 PROCEDURE — 90832 PSYTX W PT 30 MINUTES: CPT | Performed by: PSYCHOLOGIST

## 2018-09-20 NOTE — PATIENT INSTRUCTIONS
1.  Set calendar reminders, tell others you plan to go to the gym do give yourself accountability.     -Even starting out 15-20 min 2x per week and then increase when you feel better    2. Make it a point to do weekly activities with Vidhi Danielle to help build rapport and trust.    3.  Return to see Dr. Vishal Loving in 4-6 weeks. STRESS MANAGEMENT STRATEGIES    1. Recognize Stress:  Learning to recognize when your body is reacting to stress and identifying our stressors are the first steps in managing stress. 2. Take a Break:  A change of pace, no matter how short, gives us a new outlook on old problems. Take a vacation 20 minutes a day  enjoy a change from the daily routine. 3. Learn to Relax:  Under stress, the muscles in our bodies stay tight. One of the most effective ways to combat tensions is deep muscle relaxation. Other techniques that produce muscle and mental relaxation are yoga, prayer, and deep breathing. 4. Be Nutritionally Aware:  Good nutrition is vital to optimum health, and is especially critical when we are under unusual stress, or going through a major life change. 5. Exercise Regularly:  Just like nutrition, exercise is imperative for maintaining good fitness. Whatever you enjoy  swimming, walking, jogging, aerobic exercise  will help you let off steam and work out stress. 6. Prioritize Sleep:  Aim to get 8 hours of sleep. Develop a bedtime routine and stick to it. The more well-rested you are, the more energy you will have, and the less irritable you will be. Feeling tired from lack of sleep can also lead to irrational thinking and poor decision-making. 7. Plan your Work:  Tension and anxiety really build up when our work seems endless. Plan your work to use time and energy more efficiently. Take one thing at a time. 8. Talk it Over: This may be the most important thing you can do for yourself if you cant get a handle on things. Find a good listener.   Just as

## 2018-09-20 NOTE — PROGRESS NOTES
Psychotherapy Note  Marsa Severin. Jenni Palmer Psy.D. Visit Date:  9/20/2018    Patient:  Gay Antonio  YOB: 1985  Chief Complaint:  Follow-up; Depression; Anxiety; and Stress    Duration of session:  30 minutes      S:     Pt said she's having a lot of issues with her daughter, and this is agitating her. Pt said she bought a pig and was very excited with it. She said it sleeps with them in their bed. Pt also enrolled in school because she eventually wants to get a job in [employer]. Pt was down about having gained 20 lbs in the past couple of weeks. She said she has been getting to sleep about 1a, getting up about 9a, and the Depakote has helped with sleep. She said she has felt less agitated on the Depakote, but at the same time this is wearing off, her daughter comes home from school, and they clash. Pt said her daughter has been sneaking her phone to school, stealing money, not doing her schoolwork, being demanding at home, etc.  We explored some different ways of interacting with her daughter, as well as reviewed self-care strategies and also a plan to get back to the gym.           O:    Appearance    Patient presents as alert, oriented, and cooperative  Appetite abnormal: decreased  Sleep disturbance Yes  Loss of pleasure Yes  Speech    normal rate, normal volume, well articulated and clear and understandable  Mood    Anxious  Depressed  Affect    depressed affect  Thought Process    linear, goal directed, coherent and linear and coherent  Insight    Poor  Judgment    Needs improvement  Memory    recent and remote memory intact  Suicide Assessment    no suicidal ideation    A:    1. Major depressive disorder, recurrent episode, moderate with anxious distress (HCC)        Pt is willing to engage in psychotherapy to address depression and anxiety and try to get her life back on a healthy path. Right now everything feels like a priority for her and she doesn't know how to slow things down.   She

## 2018-09-24 ENCOUNTER — HOSPITAL ENCOUNTER (EMERGENCY)
Age: 33
Discharge: HOME | End: 2018-09-24
Payer: COMMERCIAL

## 2018-09-24 VITALS
DIASTOLIC BLOOD PRESSURE: 76 MMHG | HEIGHT: 67 IN | OXYGEN SATURATION: 98 % | HEART RATE: 76 BPM | WEIGHT: 194 LBS | BODY MASS INDEX: 30.45 KG/M2 | RESPIRATION RATE: 16 BRPM | SYSTOLIC BLOOD PRESSURE: 123 MMHG | TEMPERATURE: 98.1 F

## 2018-09-24 DIAGNOSIS — B86 SCABIES: Primary | ICD-10-CM

## 2018-09-24 PROCEDURE — 99213 OFFICE O/P EST LOW 20 MIN: CPT | Performed by: NURSE PRACTITIONER

## 2018-09-24 PROCEDURE — 99212 OFFICE O/P EST SF 10 MIN: CPT

## 2018-09-24 RX ORDER — ESCITALOPRAM OXALATE 10 MG/1
10 TABLET ORAL DAILY
COMMUNITY
End: 2018-09-26 | Stop reason: ALTCHOICE

## 2018-09-24 RX ORDER — DIVALPROEX SODIUM 250 MG/1
250 TABLET, DELAYED RELEASE ORAL 3 TIMES DAILY
COMMUNITY
End: 2018-12-24

## 2018-09-24 RX ORDER — PERMETHRIN 50 MG/G
CREAM TOPICAL
Qty: 1 TUBE | Refills: 0 | Status: SHIPPED | OUTPATIENT
Start: 2018-09-24 | End: 2019-07-29 | Stop reason: ALTCHOICE

## 2018-09-24 ASSESSMENT — ENCOUNTER SYMPTOMS
COUGH: 0
CHEST TIGHTNESS: 0
SINUS PRESSURE: 0
SHORTNESS OF BREATH: 0

## 2018-09-24 NOTE — ED PROVIDER NOTES
[id number]  Urgent Care Encounter       CHIEF COMPLAINT       Chief Complaint   Patient presents with    Rash       Nurses Notes reviewed and I agree except as noted in the HPI. HISTORY OF PRESENT ILLNESS   Alecia Gonzalez is a 29 y.o. female who presents For evaluation of rash to the outer abdominal area/belt line. Patient reports that the rash has been present for approximately one week she's been using hydrocortisone ointment at home and she believed that this was possibly an allergic reaction to a intravaginal injection that she had at her [facility] office. She states that the rash has not improved with hydrocortisone, however it is pruritic. She states she is also been using over-the-counter allergy medicine without relief. She denies any fever, chills, or any other concerns. The history is provided by the patient. REVIEW OF SYSTEMS     Review of Systems   Constitutional: Negative for activity change, chills and fatigue. HENT: Negative for congestion and sinus pressure. Respiratory: Negative for cough, chest tightness and shortness of breath. Cardiovascular: Negative for chest pain. Musculoskeletal: Negative for arthralgias and myalgias. Skin: Positive for rash. PAST MEDICAL HISTORY         Diagnosis Date    Abnormal Pap smear and cervical HPV (human papillomavirus)     Anxiety     Asthma     Depression     Smoker     Substance abuse     history of    Tonsillitis     recurrent       SURGICAL HISTORY     Patient  has a past surgical history that includes Dental surgery; Tubal ligation (1/2012); and Foot surgery (Right, 08/26/14).     CURRENT MEDICATIONS       Discharge Medication List as of 9/24/2018  1:27 PM      CONTINUE these medications which have NOT CHANGED    Details   escitalopram (LEXAPRO) 10 MG tablet Take 10 mg by mouth dailyHistorical Med      divalproex (DEPAKOTE) 250 MG DR tablet Take 250 mg by mouth 3 times dailyHistorical Med Temp: 98.1 °F (36.7 °C), Pulse: 76, Resp: 16, SpO2: 98 %,Estimated body mass index is 30.38 kg/m² as calculated from the following:    Height as of this encounter: 5' 7\" (1.702 m). Weight as of this encounter: 194 lb (88 kg). ,Patient's last menstrual period was 08/16/2018. Physical Exam   Constitutional: She is oriented to person, place, and time. She appears well-developed and well-nourished. No distress. Pulmonary/Chest: Effort normal. No respiratory distress. Neurological: She is alert and oriented to person, place, and time. No cranial nerve deficit. Skin: Skin is warm and dry. Rash noted. She is not diaphoretic. Erythematous, raised an excoriated rash noted to the outer abdomen as well as to the belt line. It does appear to be burrowing consistent with scabies   Psychiatric: She has a normal mood and affect. Nursing note and vitals reviewed. DIAGNOSTIC RESULTS     Labs:No results found for this visit on 09/24/18. IMAGING:    No orders to display         EKG:  None    URGENT CARE COURSE:     Vitals:    09/24/18 1302   BP: 123/76   Pulse: 76   Resp: 16   Temp: 98.1 °F (36.7 °C)   TempSrc: Oral   SpO2: 98%   Weight: 194 lb (88 kg)   Height: 5' 7\" (1.702 m)       Medications - No data to display         PROCEDURES:  None    FINAL IMPRESSION      1. Scabies          DISPOSITION/PLAN     Patient will be treated with permethrin and is instructed to use as prescribed. She does have a appointment scheduled with her PCP in 2 days and she is instructed to keep this appointment for reevaluation of the rash. She is agreeable to plan for discharge with follow-up as discussed.       PATIENT REFERRED TO:  Gareth Galindo, APRN - CNP  [address] [place]      DISCHARGE MEDICATIONS:  Discharge Medication List as of 9/24/2018  1:27 PM      START taking these medications    Details   permethrin (ELIMITE) 5 % cream Apply topically as directed, Disp-1 Tube, R-0, Normal             Discharge Medication List as of 9/24/2018  1:27 PM      STOP taking these medications       divalproex (DEPAKOTE ER) 250 MG extended release tablet Comments:   Reason for Stopping:         buPROPion (WELLBUTRIN XL) 150 MG extended release tablet Comments:   Reason for Stopping:               Discharge Medication List as of 9/24/2018  1:27 PM          Brandy Faith, NACHO - CNP    (Please note that portions of this note were completed with a voice recognition program.  Efforts were made to edit the dictations but occasionally words are mis-transcribed.)          NACHO Qureshi - ABRAM  09/24/18 5300

## 2018-09-26 ENCOUNTER — OFFICE VISIT (OUTPATIENT)
Dept: FAMILY MEDICINE CLINIC | Age: 33
End: 2018-09-26
Payer: COMMERCIAL

## 2018-09-26 VITALS
SYSTOLIC BLOOD PRESSURE: 120 MMHG | HEIGHT: 67 IN | WEIGHT: 195 LBS | HEART RATE: 90 BPM | RESPIRATION RATE: 16 BRPM | DIASTOLIC BLOOD PRESSURE: 78 MMHG | TEMPERATURE: 98.4 F | BODY MASS INDEX: 30.61 KG/M2

## 2018-09-26 DIAGNOSIS — Z13.31 POSITIVE DEPRESSION SCREENING: ICD-10-CM

## 2018-09-26 DIAGNOSIS — L30.9 DERMATITIS: ICD-10-CM

## 2018-09-26 DIAGNOSIS — F41.9 ANXIETY: ICD-10-CM

## 2018-09-26 DIAGNOSIS — F32.9 REACTIVE DEPRESSION: Primary | ICD-10-CM

## 2018-09-26 PROCEDURE — 4004F PT TOBACCO SCREEN RCVD TLK: CPT | Performed by: NURSE PRACTITIONER

## 2018-09-26 PROCEDURE — G8431 POS CLIN DEPRES SCRN F/U DOC: HCPCS | Performed by: NURSE PRACTITIONER

## 2018-09-26 PROCEDURE — G8417 CALC BMI ABV UP PARAM F/U: HCPCS | Performed by: NURSE PRACTITIONER

## 2018-09-26 PROCEDURE — 99214 OFFICE O/P EST MOD 30 MIN: CPT | Performed by: NURSE PRACTITIONER

## 2018-09-26 PROCEDURE — G8427 DOCREV CUR MEDS BY ELIG CLIN: HCPCS | Performed by: NURSE PRACTITIONER

## 2018-09-26 RX ORDER — BUPROPION HYDROCHLORIDE 150 MG/1
150 TABLET ORAL EVERY MORNING
Qty: 30 TABLET | Refills: 3 | Status: SHIPPED | OUTPATIENT
Start: 2018-09-26 | End: 2019-07-12

## 2018-09-26 RX ORDER — TRIAMCINOLONE ACETONIDE OINTMENT USP, 0.05% 0.5 MG/G
OINTMENT TOPICAL 2 TIMES DAILY
Qty: 85 G | Refills: 2 | Status: SHIPPED | OUTPATIENT
Start: 2018-09-26 | End: 2019-07-12

## 2018-09-26 NOTE — PROGRESS NOTES
distress  Cardiovascular: normal rate, regular rhythm, normal S1 and S2, no murmurs, rubs, clicks, or gallops, distal pulses intact, no carotid bruits  Abdomen: soft, non-tender, non-distended, normal bowel sounds, no masses or organomegaly, no rebound or guarding  Extremities: no cyanosis, clubbing or edema  Musculoskeletal: No joint swelling or gross deformity   Neuro:  Alert, 5/5 strength globally and symmetrically, 2+ patellar reflexes b/l  Skin: warm and dry, erythemic papular rash on abdomen and upper thighs bilaterally    Denies auditory or visual hallucinations. Affect is appropriate. Mood is congruent. She denies suicidal and homicidal thought, plan, or intent. She has good insight into current situation. ASSESSMENT & PLAN  Maria Elena Dobson was seen today for follow-up and rash. Diagnoses and all orders for this visit:    Reactive depression  -     buPROPion (WELLBUTRIN XL) 150 MG extended release tablet; Take 1 tablet by mouth every morning    Positive depression screening  -     Positive Screen for Clinical Depression with a Documented Follow-up Plan     Anxiety  -     buPROPion (WELLBUTRIN XL) 150 MG extended release tablet; Take 1 tablet by mouth every morning    Dermatitis  -     triamcinolone (KENALOG) 0.05 % OINT ointment; Apply topically 2 times daily        Return in about 2 months (around 11/26/2018) for check up .

## 2018-10-03 ENCOUNTER — HOSPITAL ENCOUNTER (EMERGENCY)
Age: 33
Discharge: HOME | End: 2018-10-03
Payer: COMMERCIAL

## 2018-10-03 VITALS
DIASTOLIC BLOOD PRESSURE: 83 MMHG | TEMPERATURE: 98.6 F | BODY MASS INDEX: 30.29 KG/M2 | RESPIRATION RATE: 16 BRPM | HEART RATE: 78 BPM | SYSTOLIC BLOOD PRESSURE: 134 MMHG | HEIGHT: 67 IN | OXYGEN SATURATION: 100 % | WEIGHT: 193 LBS

## 2018-10-03 DIAGNOSIS — S20.112A ABRASION OF LEFT BREAST, INITIAL ENCOUNTER: Primary | ICD-10-CM

## 2018-10-03 DIAGNOSIS — S20.311A ABRASION OF RIGHT CHEST WALL, INITIAL ENCOUNTER: ICD-10-CM

## 2018-10-03 PROCEDURE — 99213 OFFICE O/P EST LOW 20 MIN: CPT | Performed by: NURSE PRACTITIONER

## 2018-10-03 PROCEDURE — 99212 OFFICE O/P EST SF 10 MIN: CPT

## 2018-10-03 RX ORDER — MUPIROCIN CALCIUM 20 MG/G
CREAM TOPICAL
Qty: 15 G | Refills: 0 | Status: SHIPPED | OUTPATIENT
Start: 2018-10-03 | End: 2018-11-02

## 2018-10-03 ASSESSMENT — ENCOUNTER SYMPTOMS
WHEEZING: 0
STRIDOR: 0
COUGH: 0
DIARRHEA: 0
NAUSEA: 0
CHEST TIGHTNESS: 0
COLOR CHANGE: 0
CONSTIPATION: 0
VOMITING: 0
SHORTNESS OF BREATH: 0
CHOKING: 0
APNEA: 0

## 2018-10-03 ASSESSMENT — PAIN SCALES - GENERAL: PAINLEVEL_OUTOF10: 0

## 2018-12-24 ENCOUNTER — OFFICE VISIT (OUTPATIENT)
Dept: FAMILY MEDICINE CLINIC | Age: 33
End: 2018-12-24
Payer: COMMERCIAL

## 2018-12-24 VITALS
HEIGHT: 67 IN | HEART RATE: 80 BPM | WEIGHT: 192.6 LBS | RESPIRATION RATE: 14 BRPM | BODY MASS INDEX: 30.23 KG/M2 | SYSTOLIC BLOOD PRESSURE: 124 MMHG | TEMPERATURE: 98.2 F | DIASTOLIC BLOOD PRESSURE: 76 MMHG

## 2018-12-24 DIAGNOSIS — N92.1 MENORRHAGIA WITH IRREGULAR CYCLE: Primary | ICD-10-CM

## 2018-12-24 DIAGNOSIS — R10.9 ABDOMINAL CRAMPING: ICD-10-CM

## 2018-12-24 PROCEDURE — G8427 DOCREV CUR MEDS BY ELIG CLIN: HCPCS | Performed by: NURSE PRACTITIONER

## 2018-12-24 PROCEDURE — 4004F PT TOBACCO SCREEN RCVD TLK: CPT | Performed by: NURSE PRACTITIONER

## 2018-12-24 PROCEDURE — G8417 CALC BMI ABV UP PARAM F/U: HCPCS | Performed by: NURSE PRACTITIONER

## 2018-12-24 PROCEDURE — G8484 FLU IMMUNIZE NO ADMIN: HCPCS | Performed by: NURSE PRACTITIONER

## 2018-12-24 PROCEDURE — 99213 OFFICE O/P EST LOW 20 MIN: CPT | Performed by: NURSE PRACTITIONER

## 2018-12-24 RX ORDER — NAPROXEN SODIUM 550 MG/1
550 TABLET ORAL 2 TIMES DAILY WITH MEALS
Qty: 60 TABLET | Refills: 3 | Status: SHIPPED | OUTPATIENT
Start: 2018-12-24 | End: 2019-04-15 | Stop reason: SDUPTHER

## 2018-12-24 ASSESSMENT — ENCOUNTER SYMPTOMS: RESPIRATORY NEGATIVE: 1

## 2018-12-24 NOTE — PROGRESS NOTES
Visit Information    Have you changed or started any medications since your last visit including any over-the-counter medicines, vitamins, or herbal medicines? no   Are you having any side effects from any of your medications? -  no  Have you stopped taking any of your medications? Is so, why? -  no    Have you seen any other physician or provider since your last visit? Yes - Records Obtained  Have you had any other diagnostic tests since your last visit? Yes - Records Obtained  Have you been seen in the emergency room and/or had an admission to a hospital since we last saw you? No  Have you had your routine dental cleaning in the past 6 months? yes - routine    Have you activated your [employer] account? If not, what are your barriers?  Yes     Patient Care Team:  NACHO Orlando CNP as PCP - General (Family Nurse Practitioner)  NACHO Orlando CNP as PCP - [facility] Attributed Provider    Medical History Review  Past Medical, Family, and Social History reviewed and does not contribute to the patient presenting condition    Health Maintenance   Topic Date Due    Pneumococcal med risk (1 of 1 - PPSV23) 05/21/2005    Flu vaccine (1) 09/01/2018    Cervical cancer screen  05/08/2021    DTaP/Tdap/Td vaccine (2 - Td) 04/28/2026    HIV screen  Completed

## 2018-12-24 NOTE — PROGRESS NOTES
[address]  [address] [address]  Dept: [phone]  Dept Fax: [phone]  Loc: Shannan Stevens is a 29 y.o. Mariella Fox presents today for her medical conditions/complaints as noted below. Denisse AVILA Babakis c/o of Abdominal Pain (Pt is c/o severe lower abd pain that staretd about 2 months ago. She states the pain is on and off and started when she started her period and it has not stopped.)      HPI:     Pt here to discuss her vaginal bleeding. She has been having irregular vaginal bleeding for the lasts several months. She had been working with local gyn and had had some bladder irrigations. She had her birth control pills adjussted and has not had any improvement. For the last two months the bleeding has been constant everyday. Some days are heavier than others. She also has lower abdominal cramping. I did review her gyn notes and it was recommended she have a hysteroscopy and D&c but states her provider moved and she could not get this done. States the bleeding is bright red.          Current Outpatient Prescriptions   Medication Sig Dispense Refill    naproxen sodium (ANAPROX DS) 550 MG tablet Take 1 tablet by mouth 2 times daily (with meals) 60 tablet 3    buPROPion (WELLBUTRIN XL) 150 MG extended release tablet Take 1 tablet by mouth every morning 30 tablet 3    triamcinolone (KENALOG) 0.05 % OINT ointment Apply topically 2 times daily 85 g 2    permethrin (ELIMITE) 5 % cream Apply topically as directed 1 Tube 0    nicotine polacrilex (NICORETTE STARTER KIT) 4 MG gum Take 1 each by mouth as needed for Smoking cessation 110 each 1    ibuprofen (ADVIL;MOTRIN) 800 MG tablet Take 1 tablet by mouth every 6 hours as needed for Pain 120 tablet 0    amitriptyline (ELAVIL) 10 MG tablet Take 10 mg by mouth nightly      lidocaine viscous (XYLOCAINE) 2 % solution Take 15 mLs by mouth as needed for Irritation      norgestimate-ethinyl estradiol (MONO-LINYAH) 0.25-35 MG-MCG per tablet Take 1 tablet by mouth daily      loratadine (CLARITIN) 10 MG tablet Take 1 tablet by mouth daily 30 tablet 6    fluticasone (FLONASE) 50 MCG/ACT nasal spray 1 spray by Nasal route daily 1 Bottle 3    ondansetron (ZOFRAN) 4 MG tablet Take 1 tablet by mouth every 8 hours as needed for Nausea or Vomiting 21 tablet 0    guaiFENesin (MUCINEX) 600 MG extended release tablet Take 2 tablets by mouth 2 times daily 20 tablet 0    ibuprofen (ADVIL;MOTRIN) 800 MG tablet Take 1 tablet by mouth 2 times daily 60 tablet 3    melatonin 3 MG TABS tablet Take 3 mg by mouth daily       No current facility-administered medications for this visit.         Past Medical History:   Diagnosis Date    Abnormal Pap smear and cervical HPV (human papillomavirus)     Anxiety     Asthma     Depression     Smoker     Substance abuse ([facility] [address]     history of    Tonsillitis     recurrent      Past Surgical History:   Procedure Laterality Date    DENTAL SURGERY      FOOT SURGERY Right 08/26/14    TUBAL LIGATION  1/2012     Family History   Problem Relation Age of Onset    Cancer Mother         ovarian in mother  , paternal grandmother- breast cancer    Alcohol Abuse Other          family     Social History   Substance Use Topics    Smoking status: Current Every Day Smoker     Packs/day: 0.50     Years: 10.00     Types: Cigarettes     Last attempt to quit: 12/15/2015    Smokeless tobacco: Never Used    Alcohol use 0.6 oz/week     1 Glasses of wine per week        Allergies   Allergen Reactions    Augmentin [Amoxicillin-Pot Clavulanate] Swelling    Bee Venom Hives    Nicotine Swelling    Bee Pollen Swelling and Rash       Health Maintenance   Topic Date Due    Pneumococcal med risk (1 of 1 - PPSV23) 05/21/2005    Flu vaccine (1) 09/01/2018    Cervical cancer screen  05/08/2021    DTaP/Tdap/Td vaccine (2 - Td) 04/28/2026    HIV screen  Completed Subjective:      Review of Systems   Constitutional: Negative for fatigue. Respiratory: Negative. Cardiovascular: Negative. Genitourinary: Positive for menstrual problem, pelvic pain and vaginal bleeding. Negative for difficulty urinating, urgency, vaginal discharge and vaginal pain. Objective:     /76   Pulse 80   Temp 98.2 °F (36.8 °C)   Resp 14   Ht 5' 7\" (1.702 m)   Wt 192 lb 9.6 oz (87.4 kg)   BMI 30.17 kg/m²     Physical Exam   Constitutional: She appears well-developed and well-nourished. No distress. Cardiovascular: Normal rate, regular rhythm, normal heart sounds and intact distal pulses. No murmur heard. Abdominal: Soft. Bowel sounds are normal. She exhibits no distension. There is tenderness (lower pelvic, bilateral). Skin: Skin is warm and dry. Psychiatric: She has a normal mood and affect. Her behavior is normal. Judgment and thought content normal.   Nursing note and vitals reviewed. Assessment/Plan:          1. Menorrhagia with irregular cycle    - CBC Auto Differential; Future  - TSH without Reflex; Future  - T4, Free; Future  - US OB TRANSVAGINAL; Future    2. Abdominal cramping    - US OB TRANSVAGINAL; Future  Await results, may need to refer to gyn     Return if symptoms worsen or fail to improve. Reccommended tobaccocessation options including pharmacologic methods, counseled great than 3 minutesduring this visit:  Yes[]  No  []       Patient given educational materials -see patient instructions. Discussed use, benefit, and side effects of prescribedmedications. All patient questions answered. Pt voiced understanding. Reviewedhealth maintenance. Instructed to continue current medications, diet and exercise. Patient agreed with treatment plan. Follow up as directed.        Electronicallysigned by NACHO Kidd CNP on 12/24/2018 at 12:06 PM

## 2018-12-26 ENCOUNTER — TELEPHONE (OUTPATIENT)
Dept: FAMILY MEDICINE CLINIC | Age: 33
End: 2018-12-26

## 2019-01-02 ENCOUNTER — NURSE ONLY (OUTPATIENT)
Dept: LAB | Age: 34
End: 2019-01-02

## 2019-01-02 DIAGNOSIS — N92.1 MENORRHAGIA WITH IRREGULAR CYCLE: ICD-10-CM

## 2019-01-02 LAB
BASOPHILS # BLD: 0.6 %
BASOPHILS ABSOLUTE: 0.1 THOU/MM3 (ref 0–0.1)
EOSINOPHIL # BLD: 1.5 %
EOSINOPHILS ABSOLUTE: 0.2 THOU/MM3 (ref 0–0.4)
ERYTHROCYTE [DISTWIDTH] IN BLOOD BY AUTOMATED COUNT: 13 % (ref 11.5–14.5)
ERYTHROCYTE [DISTWIDTH] IN BLOOD BY AUTOMATED COUNT: 46.6 FL (ref 35–45)
HCT VFR BLD CALC: 41.9 % (ref 37–47)
HEMOGLOBIN: 13.8 GM/DL (ref 12–16)
IMMATURE GRANS (ABS): 0.03 THOU/MM3 (ref 0–0.07)
IMMATURE GRANULOCYTES: 0.3 %
LYMPHOCYTES # BLD: 17.3 %
LYMPHOCYTES ABSOLUTE: 1.9 THOU/MM3 (ref 1–4.8)
MCH RBC QN AUTO: 32.3 PG (ref 26–33)
MCHC RBC AUTO-ENTMCNC: 32.9 GM/DL (ref 32.2–35.5)
MCV RBC AUTO: 98.1 FL (ref 81–99)
MONOCYTES # BLD: 5.8 %
MONOCYTES ABSOLUTE: 0.6 THOU/MM3 (ref 0.4–1.3)
NUCLEATED RED BLOOD CELLS: 0 /100 WBC
PLATELET # BLD: 345 THOU/MM3 (ref 130–400)
PMV BLD AUTO: 10.2 FL (ref 9.4–12.4)
RBC # BLD: 4.27 MILL/MM3 (ref 4.2–5.4)
SEG NEUTROPHILS: 74.5 %
SEGMENTED NEUTROPHILS ABSOLUTE COUNT: 8 THOU/MM3 (ref 1.8–7.7)
T4 FREE: 1.05 NG/DL (ref 0.93–1.76)
TSH SERPL DL<=0.05 MIU/L-ACNC: 2.61 UIU/ML (ref 0.4–4.2)
WBC # BLD: 10.8 THOU/MM3 (ref 4.8–10.8)

## 2019-01-03 ENCOUNTER — TELEPHONE (OUTPATIENT)
Dept: FAMILY MEDICINE CLINIC | Age: 34
End: 2019-01-03

## 2019-01-04 ENCOUNTER — TELEPHONE (OUTPATIENT)
Dept: FAMILY MEDICINE CLINIC | Age: 34
End: 2019-01-04

## 2019-01-04 ENCOUNTER — HOSPITAL ENCOUNTER (OUTPATIENT)
Dept: ULTRASOUND IMAGING | Age: 34
Discharge: HOME | End: 2019-01-04
Payer: COMMERCIAL

## 2019-01-04 DIAGNOSIS — N92.1 MENORRHAGIA WITH IRREGULAR CYCLE: ICD-10-CM

## 2019-01-04 DIAGNOSIS — R10.9 ABDOMINAL CRAMPING: ICD-10-CM

## 2019-01-04 PROCEDURE — 76830 TRANSVAGINAL US NON-OB: CPT

## 2019-01-07 ENCOUNTER — TELEPHONE (OUTPATIENT)
Dept: FAMILY MEDICINE CLINIC | Age: 34
End: 2019-01-07

## 2019-01-07 DIAGNOSIS — N92.1 MENORRHAGIA WITH IRREGULAR CYCLE: Primary | ICD-10-CM

## 2019-04-15 ENCOUNTER — OFFICE VISIT (OUTPATIENT)
Dept: FAMILY MEDICINE CLINIC | Age: 34
End: 2019-04-15
Payer: COMMERCIAL

## 2019-04-15 VITALS
RESPIRATION RATE: 12 BRPM | SYSTOLIC BLOOD PRESSURE: 146 MMHG | HEIGHT: 67 IN | BODY MASS INDEX: 32.46 KG/M2 | HEART RATE: 96 BPM | TEMPERATURE: 98.5 F | DIASTOLIC BLOOD PRESSURE: 88 MMHG | WEIGHT: 206.8 LBS

## 2019-04-15 DIAGNOSIS — R55 SYNCOPE, UNSPECIFIED SYNCOPE TYPE: Primary | ICD-10-CM

## 2019-04-15 DIAGNOSIS — E61.1 LOW IRON: ICD-10-CM

## 2019-04-15 PROCEDURE — G8417 CALC BMI ABV UP PARAM F/U: HCPCS | Performed by: NURSE PRACTITIONER

## 2019-04-15 PROCEDURE — 4004F PT TOBACCO SCREEN RCVD TLK: CPT | Performed by: NURSE PRACTITIONER

## 2019-04-15 PROCEDURE — 93000 ELECTROCARDIOGRAM COMPLETE: CPT | Performed by: NURSE PRACTITIONER

## 2019-04-15 PROCEDURE — G8427 DOCREV CUR MEDS BY ELIG CLIN: HCPCS | Performed by: NURSE PRACTITIONER

## 2019-04-15 PROCEDURE — 99214 OFFICE O/P EST MOD 30 MIN: CPT | Performed by: NURSE PRACTITIONER

## 2019-04-15 RX ORDER — CITALOPRAM 20 MG/1
TABLET ORAL
Refills: 0 | COMMUNITY
Start: 2019-04-12 | End: 2020-02-13 | Stop reason: ALTCHOICE

## 2019-04-15 RX ORDER — NAPROXEN SODIUM 550 MG/1
TABLET ORAL
Qty: 60 TABLET | Refills: 3 | Status: SHIPPED | OUTPATIENT
Start: 2019-04-15 | End: 2019-07-12

## 2019-04-15 ASSESSMENT — ENCOUNTER SYMPTOMS: RESPIRATORY NEGATIVE: 1

## 2019-04-15 NOTE — PROGRESS NOTES
tablet by mouth every 6 hours as needed for Pain 120 tablet 0    amitriptyline (ELAVIL) 10 MG tablet Take 10 mg by mouth nightly      lidocaine viscous (XYLOCAINE) 2 % solution Take 15 mLs by mouth as needed for Irritation      norgestimate-ethinyl estradiol (MONO-LINYAH) 0.25-35 MG-MCG per tablet Take 1 tablet by mouth daily      loratadine (CLARITIN) 10 MG tablet Take 1 tablet by mouth daily 30 tablet 6    fluticasone (FLONASE) 50 MCG/ACT nasal spray 1 spray by Nasal route daily 1 Bottle 3    ondansetron (ZOFRAN) 4 MG tablet Take 1 tablet by mouth every 8 hours as needed for Nausea or Vomiting 21 tablet 0    guaiFENesin (MUCINEX) 600 MG extended release tablet Take 2 tablets by mouth 2 times daily 20 tablet 0    ibuprofen (ADVIL;MOTRIN) 800 MG tablet Take 1 tablet by mouth 2 times daily 60 tablet 3    melatonin 3 MG TABS tablet Take 3 mg by mouth daily       No current facility-administered medications for this visit. Past Medical History:   Diagnosis Date    Abnormal Pap smear and cervical HPV (human papillomavirus)     Anxiety     Asthma     Depression     Smoker     Substance abuse ([facility] [address]     history of    Tonsillitis     recurrent      Past Surgical History:   Procedure Laterality Date    DENTAL SURGERY      FOOT SURGERY Right 08/26/14    TUBAL LIGATION  1/2012     Family History   Problem Relation Age of Onset    Cancer Mother         ovarian in mother  , paternal grandmother- breast cancer    Alcohol Abuse Other          family     Social History     Tobacco Use    Smoking status: Current Every Day Smoker     Packs/day: 0.50     Years: 10.00     Pack years: 5.00     Types: Cigarettes     Last attempt to quit: 12/15/2015     Years since quitting: 3.3    Smokeless tobacco: Never Used   Substance Use Topics    Alcohol use:  Yes     Alcohol/week: 0.6 oz     Types: 1 Glasses of wine per week        Allergies   Allergen Reactions    Augmentin [Amoxicillin-Pot Clavulanate] Swelling  Bee Venom Hives    Nicotine Swelling    Bee Pollen Swelling and Rash       Health Maintenance   Topic Date Due    Pneumococcal 0-64 years Vaccine (1 of 1 - PPSV23) 05/21/1992    Varicella Vaccine (1 of 2 - 13+ 2-dose series) 05/21/1999    Flu vaccine (Season Ended) 09/01/2019    Cervical cancer screen  05/08/2021    DTaP/Tdap/Td vaccine (2 - Td) 04/28/2026    HIV screen  Completed       Subjective:      Review of Systems   Constitutional: Negative for chills, fatigue and fever. Respiratory: Negative. Cardiovascular: Negative. Skin: Negative. Neurological: Positive for dizziness and syncope. Negative for seizures, facial asymmetry, weakness, numbness and headaches. Objective:      BP (!) 146/88   Pulse 96   Temp 98.5 °F (36.9 °C) (Oral)   Resp 12   Ht 5' 7\" (1.702 m)   Wt 206 lb 12.8 oz (93.8 kg)   LMP 03/04/2019   BMI 32.39 kg/m²      Physical Exam   Constitutional: She is oriented to person, place, and time. She appears well-developed and well-nourished. No distress. HENT:   Right Ear: Hearing, tympanic membrane, external ear and ear canal normal.   Left Ear: Hearing, tympanic membrane, external ear and ear canal normal.   Nose: Nose normal.   Mouth/Throat: Oropharynx is clear and moist.   Eyes: Pupils are equal, round, and reactive to light. Conjunctivae and EOM are normal.   Cardiovascular: Normal rate, regular rhythm, normal heart sounds and intact distal pulses. No murmur heard. Pulmonary/Chest: Effort normal and breath sounds normal.   Abdominal: Soft. Bowel sounds are normal. She exhibits no distension. There is no tenderness. Musculoskeletal: Normal range of motion. She exhibits no edema, tenderness or deformity. Neurological: She is alert and oriented to person, place, and time. She displays no atrophy, no tremor and normal reflexes. No cranial nerve deficit or sensory deficit. She exhibits normal muscle tone. She displays no seizure activity.  Coordination and gait normal.   Reflex Scores:       Patellar reflexes are 2+ on the right side and 2+ on the left side. Achilles reflexes are 2+ on the right side and 2+ on the left side. No cranial nerve deficit noted. CN III-XII intact       Skin: Skin is warm and dry. Capillary refill takes less than 2 seconds. Psychiatric: She has a normal mood and affect. Her behavior is normal. Judgment and thought content normal.   Nursing note and vitals reviewed. Assessment/Plan:           1. Syncope, unspecified syncope type    - EKG 12 Lead  - Holter Monitor 48 Hour; Future  - ECHO Complete 2D W Doppler W Color; Future  - Basic Metabolic Panel; Future  - CBC Auto Differential; Future  - Iron; Future  - Ferritin; Future  Keep well hydrated  2. Low iron    - CBC Auto Differential; Future  - Iron; Future  - Ferritin; Future      Return if symptoms worsen or fail to improve. Reccommended tobaccocessation options including pharmacologic methods, counseled great than 3 minutesduring this visit:  Yes[]  No  []       Patient given educational materials -see patient instructions. Discussed use, benefit, and side effects of prescribedmedications. All patient questions answered. Pt voiced understanding. Reviewedhealth maintenance. Instructed to continue current medications, diet and exercise. Patient agreed with treatment plan. Follow up as directed.        Electronicallysigned by NACHO Kc CNP on 4/15/2019 at 2:23 PM

## 2019-04-19 LAB
ABSOLUTE BASO #: 0.1 K/UL (ref 0–0.1)
ABSOLUTE EOS #: 0.1 K/UL (ref 0.1–0.4)
ABSOLUTE LYMPH #: 1.8 K/UL (ref 0.8–5.2)
ABSOLUTE MONO #: 0.7 K/UL (ref 0.1–0.9)
ABSOLUTE NEUT #: 2.9 K/UL (ref 1.3–9.1)
BASOPHILS RELATIVE PERCENT: 0.9 %
EOSINOPHILS RELATIVE PERCENT: 2.5 %
HCT VFR BLD CALC: 42.4 % (ref 36–48)
HEMOGLOBIN: 14.3 G/DL (ref 12–16)
LYMPHOCYTE %: 31.6 %
MCH RBC QN AUTO: 32 PG (ref 27–34)
MCHC RBC AUTO-ENTMCNC: 33.7 G/DL (ref 31–36)
MCV RBC AUTO: 94.9 FL (ref 80–100)
MONOCYTES # BLD: 11.7 %
NEUTROPHILS RELATIVE PERCENT: 53.1 %
PDW BLD-RTO: 12.4 % (ref 10.8–14.8)
PLATELETS: 301 K/UL (ref 150–450)
RBC: 4.47 M/UL (ref 4–5.5)
WBC: 5.5 K/UL (ref 3.7–10.8)

## 2019-04-20 LAB
ANION GAP SERPL CALCULATED.3IONS-SCNC: 8 MMOL/L (ref 4–12)
BUN BLDV-MCNC: 15 MG/DL (ref 5–23)
CALCIUM SERPL-MCNC: 9.7 MG/DL (ref 8.5–10.5)
CHLORIDE BLD-SCNC: 106 MMOL/L (ref 98–109)
CO2: 26 MMOL/L (ref 22–32)
CREAT SERPL-MCNC: 0.8 MG/DL (ref 0.4–1)
EGFR AFRICAN AMERICAN: >60 ML/MIN/1.73SQ.M
EGFR IF NONAFRICAN AMERICAN: >60 ML/MIN/1.73SQ.M
FERRITIN: 19 NG/ML (ref 11–307)
GLUCOSE: 85 MG/DL (ref 65–99)
IRON: 84 UG/DL (ref 50–170)
POTASSIUM SERPL-SCNC: 4.7 MMOL/L (ref 3.5–5)
SODIUM BLD-SCNC: 140 MMOL/L (ref 134–146)

## 2019-04-22 ENCOUNTER — TELEPHONE (OUTPATIENT)
Dept: FAMILY MEDICINE CLINIC | Age: 34
End: 2019-04-22

## 2019-04-22 NOTE — TELEPHONE ENCOUNTER
Pt returned our call, was given the results & she verbalized understanding. Pt states she's feeling \"pretty good\" & she denies any current Sx's.

## 2019-04-22 NOTE — TELEPHONE ENCOUNTER
----- Message from NACHO Hernandez CNP sent at 4/22/2019  9:53 AM EDT -----  Please let pt know all of her labs are normal. She does not have low iron or anemia low blood counts. We will be in touch with her after she gets her other tests done. Ask how she hs been feeling.

## 2019-04-26 ENCOUNTER — TELEPHONE (OUTPATIENT)
Dept: FAMILY MEDICINE CLINIC | Age: 34
End: 2019-04-26

## 2019-04-26 NOTE — TELEPHONE ENCOUNTER
The ECHO was denied and the reasons are as follows: prior to an approval, the following physician's notes should be sent: details of extra heart sounds (murmur) on exam or signs and complaints of a heart muscle problem.  Syncope is not enough for an approval     LILY @ [phone] tracking# [id number]

## 2019-06-28 ENCOUNTER — HOSPITAL ENCOUNTER (OUTPATIENT)
Age: 34
Discharge: HOME | End: 2019-06-28
Payer: COMMERCIAL

## 2019-06-28 LAB
BASOPHILS # BLD: 0.6 %
BASOPHILS ABSOLUTE: 0.1 THOU/MM3 (ref 0–0.1)
EOSINOPHIL # BLD: 2.5 %
EOSINOPHILS ABSOLUTE: 0.2 THOU/MM3 (ref 0–0.4)
ERYTHROCYTE [DISTWIDTH] IN BLOOD BY AUTOMATED COUNT: 13.2 % (ref 11.5–14.5)
ERYTHROCYTE [DISTWIDTH] IN BLOOD BY AUTOMATED COUNT: 46.3 FL (ref 35–45)
HCT VFR BLD CALC: 45 % (ref 37–47)
HEMOGLOBIN: 15.6 GM/DL (ref 12–16)
IMMATURE GRANS (ABS): 0.02 THOU/MM3 (ref 0–0.07)
IMMATURE GRANULOCYTES: 0.2 %
LYMPHOCYTES # BLD: 22.5 %
LYMPHOCYTES ABSOLUTE: 2.1 THOU/MM3 (ref 1–4.8)
MCH RBC QN AUTO: 32.7 PG (ref 26–33)
MCHC RBC AUTO-ENTMCNC: 34.7 GM/DL (ref 32.2–35.5)
MCV RBC AUTO: 94.3 FL (ref 81–99)
MONOCYTES # BLD: 7.5 %
MONOCYTES ABSOLUTE: 0.7 THOU/MM3 (ref 0.4–1.3)
NUCLEATED RED BLOOD CELLS: 0 /100 WBC
PLATELET # BLD: 329 THOU/MM3 (ref 130–400)
PMV BLD AUTO: 11 FL (ref 9.4–12.4)
RBC # BLD: 4.77 MILL/MM3 (ref 4.2–5.4)
SEG NEUTROPHILS: 66.7 %
SEGMENTED NEUTROPHILS ABSOLUTE COUNT: 6.2 THOU/MM3 (ref 1.8–7.7)
WBC # BLD: 9.3 THOU/MM3 (ref 4.8–10.8)

## 2019-06-28 PROCEDURE — 36415 COLL VENOUS BLD VENIPUNCTURE: CPT

## 2019-06-28 PROCEDURE — 85025 COMPLETE CBC W/AUTO DIFF WBC: CPT

## 2019-07-12 ENCOUNTER — HOSPITAL ENCOUNTER (EMERGENCY)
Age: 34
Discharge: HOME | End: 2019-07-12
Payer: COMMERCIAL

## 2019-07-12 VITALS
OXYGEN SATURATION: 99 % | TEMPERATURE: 98.1 F | BODY MASS INDEX: 29.76 KG/M2 | HEART RATE: 78 BPM | DIASTOLIC BLOOD PRESSURE: 80 MMHG | SYSTOLIC BLOOD PRESSURE: 133 MMHG | RESPIRATION RATE: 16 BRPM | WEIGHT: 190 LBS

## 2019-07-12 DIAGNOSIS — N30.01 ACUTE CYSTITIS WITH HEMATURIA: Primary | ICD-10-CM

## 2019-07-12 LAB
BILIRUBIN URINE: NEGATIVE
BLOOD, URINE: ABNORMAL
CHARACTER, URINE: CLEAR
COLOR: YELLOW
GLUCOSE, URINE: NEGATIVE MG/DL
KETONES, URINE: NEGATIVE
LEUKOCYTES, UA: ABNORMAL
NITRATE, UA: NEGATIVE
PH UA: 6 (ref 5–9)
PROTEIN UA: NEGATIVE MG/DL
REFLEX TO URINE C & S: ABNORMAL
SPECIFIC GRAVITY UA: <= 1.005 (ref 1–1.03)
UROBILINOGEN, URINE: 0.2 EU/DL (ref 0–1)

## 2019-07-12 PROCEDURE — 87186 SC STD MICRODIL/AGAR DIL: CPT

## 2019-07-12 PROCEDURE — 87086 URINE CULTURE/COLONY COUNT: CPT

## 2019-07-12 PROCEDURE — 99213 OFFICE O/P EST LOW 20 MIN: CPT | Performed by: NURSE PRACTITIONER

## 2019-07-12 PROCEDURE — 87077 CULTURE AEROBIC IDENTIFY: CPT

## 2019-07-12 PROCEDURE — 81003 URINALYSIS AUTO W/O SCOPE: CPT

## 2019-07-12 PROCEDURE — 99213 OFFICE O/P EST LOW 20 MIN: CPT

## 2019-07-12 RX ORDER — NITROFURANTOIN 25; 75 MG/1; MG/1
100 CAPSULE ORAL 2 TIMES DAILY
Qty: 10 CAPSULE | Refills: 0 | Status: SHIPPED | OUTPATIENT
Start: 2019-07-12 | End: 2019-07-17

## 2019-07-12 RX ORDER — FLUCONAZOLE 150 MG/1
150 TABLET ORAL ONCE
Qty: 1 TABLET | Refills: 0 | Status: SHIPPED | OUTPATIENT
Start: 2019-07-12 | End: 2019-07-12

## 2019-07-12 ASSESSMENT — ENCOUNTER SYMPTOMS
SHORTNESS OF BREATH: 0
COUGH: 0
NAUSEA: 0
VOMITING: 0
ABDOMINAL PAIN: 1

## 2019-07-12 ASSESSMENT — PAIN SCALES - GENERAL: PAINLEVEL_OUTOF10: 5

## 2019-07-12 ASSESSMENT — PAIN DESCRIPTION - DESCRIPTORS: DESCRIPTORS: DISCOMFORT

## 2019-07-12 NOTE — ED PROVIDER NOTES
[place] [id number]  Urgent Care Encounter       CHIEF COMPLAINT       Chief Complaint   Patient presents with    Urinary Frequency     Frequency. , burning, doesn't feel like emptying out, urgency and getting up at night. Started last weekend. Nurses Notes reviewed and I agree except as noted in the HPI. HISTORY OF PRESENT Gregg Weeks is a 36 y.o. female who presents for evaluation of dysuria, hematuria and urinary retention. Patient reports intermittent lower abdominal cramping as well. She denies any fever, chills, nausea, or vomiting. States that she has not been taking any medications for this at home. The history is provided by the patient. REVIEW OF SYSTEMS     Review of Systems   Constitutional: Negative for chills and fever. Respiratory: Negative for cough and shortness of breath. Cardiovascular: Negative for chest pain. Gastrointestinal: Positive for abdominal pain. Negative for nausea and vomiting. Genitourinary: Positive for decreased urine volume, dysuria, frequency and urgency. Negative for vaginal discharge. Musculoskeletal: Negative for arthralgias and myalgias. Skin: Negative for rash. Allergic/Immunologic: Negative for immunocompromised state. Neurological: Negative for headaches. PAST MEDICAL HISTORY         Diagnosis Date    Abnormal Pap smear and cervical HPV (human papillomavirus)     Anxiety     Asthma     Depression     Smoker     Substance abuse ([facility])     history of    Tonsillitis     recurrent       SURGICALHISTORY     Patient  has a past surgical history that includes Dental surgery; Tubal ligation (1/2012); and Foot surgery (Right, 08/26/14).     CURRENT MEDICATIONS       Previous Medications    AMITRIPTYLINE (ELAVIL) 10 MG TABLET    Take 10 mg by mouth nightly    CITALOPRAM (CELEXA) 20 MG TABLET    take 1 tablet by mouth once daily    GUAIFENESIN (MUCINEX) 600 MG EXTENDED RELEASE TABLET    Take 2 tablets by mouth 2 times daily    IBUPROFEN (ADVIL;MOTRIN) 800 MG TABLET    Take 1 tablet by mouth 2 times daily    IBUPROFEN (ADVIL;MOTRIN) 800 MG TABLET    Take 1 tablet by mouth every 6 hours as needed for Pain    LORATADINE (CLARITIN) 10 MG TABLET    Take 1 tablet by mouth daily    NICOTINE POLACRILEX (NICORETTE STARTER KIT) 4 MG GUM    Take 1 each by mouth as needed for Smoking cessation    PERMETHRIN (ELIMITE) 5 % CREAM    Apply topically as directed       ALLERGIES     Patient is is allergic to augmentin [amoxicillin-pot clavulanate]; bee venom; nicotine; and bee pollen. Patients   Immunization History   Administered Date(s) Administered    Tdap (Boostrix, Adacel) 04/28/2016       FAMILY HISTORY     Patient's family history includes Alcohol Abuse in an other family member; Cancer in her mother. SOCIAL HISTORY     Patient  reports that she has been smoking cigarettes. She has a 5.00 pack-year smoking history. She has never used smokeless tobacco. She reports that she drinks about 0.6 oz of alcohol per week. She reports that she does not use drugs. PHYSICAL EXAM     ED TRIAGE VITALS  BP: 133/80, Temp: 98.1 °F (36.7 °C), Pulse: 78, Resp: 16, SpO2: 99 %,Estimated body mass index is 29.76 kg/m² as calculated from the following:    Height as of 4/15/19: 5' 7\" (1.702 m). Weight as of this encounter: 190 lb (86.2 kg). ,Patient's last menstrual period was 07/01/2019. Physical Exam   Constitutional: She is oriented to person, place, and time. She appears well-developed and well-nourished. No distress. Eyes: Right conjunctiva is not injected. Left conjunctiva is not injected. Pupils are equal.   Neck: Normal range of motion. Cardiovascular: Normal rate and regular rhythm. No murmur heard. Pulmonary/Chest: Effort normal and breath sounds normal. No respiratory distress. Abdominal: Soft. Normal appearance and bowel sounds are normal. There is no tenderness. There is no CVA tenderness.    Musculoskeletal:

## 2019-07-12 NOTE — ED TRIAGE NOTES
Pt walked to room 4. Pt here with complaints of frequency, burning, feels like not emptying out. Pt states no discharge. Sometimes an odor.

## 2019-07-14 LAB
ORGANISM: ABNORMAL
URINE CULTURE REFLEX: ABNORMAL

## 2019-08-05 ENCOUNTER — HOSPITAL ENCOUNTER (OUTPATIENT)
Age: 34
Discharge: HOME | End: 2019-08-06
Attending: OBSTETRICS & GYNECOLOGY | Admitting: OBSTETRICS & GYNECOLOGY
Payer: COMMERCIAL

## 2019-08-05 ENCOUNTER — ANESTHESIA (OUTPATIENT)
Dept: OPERATING ROOM | Age: 34
End: 2019-08-05
Payer: COMMERCIAL

## 2019-08-05 ENCOUNTER — ANESTHESIA EVENT (OUTPATIENT)
Dept: OPERATING ROOM | Age: 34
End: 2019-08-05
Payer: COMMERCIAL

## 2019-08-05 VITALS
TEMPERATURE: 96.8 F | OXYGEN SATURATION: 100 % | SYSTOLIC BLOOD PRESSURE: 121 MMHG | DIASTOLIC BLOOD PRESSURE: 67 MMHG | RESPIRATION RATE: 12 BRPM

## 2019-08-05 DIAGNOSIS — Z90.710 S/P HYSTERECTOMY: Primary | ICD-10-CM

## 2019-08-05 LAB
ABO: NORMAL
ANTIBODY SCREEN: NORMAL
CREAT SERPL-MCNC: 0.5 MG/DL (ref 0.4–1.2)
ERYTHROCYTE [DISTWIDTH] IN BLOOD BY AUTOMATED COUNT: 14 % (ref 11.5–14.5)
ERYTHROCYTE [DISTWIDTH] IN BLOOD BY AUTOMATED COUNT: 50.5 FL (ref 35–45)
GFR SERPL CREATININE-BSD FRML MDRD: > 90 ML/MIN/1.73M2
HCT VFR BLD CALC: 41.9 % (ref 37–47)
HEMOGLOBIN: 13.9 GM/DL (ref 12–16)
MCH RBC QN AUTO: 32.4 PG (ref 26–33)
MCHC RBC AUTO-ENTMCNC: 33.2 GM/DL (ref 32.2–35.5)
MCV RBC AUTO: 97.7 FL (ref 81–99)
PLATELET # BLD: 308 THOU/MM3 (ref 130–400)
PMV BLD AUTO: 9 FL (ref 9.4–12.4)
PREGNANCY, URINE: NEGATIVE
RBC # BLD: 4.29 MILL/MM3 (ref 4.2–5.4)
RH FACTOR: NORMAL
WBC # BLD: 7.8 THOU/MM3 (ref 4.8–10.8)

## 2019-08-05 PROCEDURE — 3600000009 HC SURGERY ROBOT BASE: Performed by: OBSTETRICS & GYNECOLOGY

## 2019-08-05 PROCEDURE — 85027 COMPLETE CBC AUTOMATED: CPT

## 2019-08-05 PROCEDURE — 2580000003 HC RX 258: Performed by: OBSTETRICS & GYNECOLOGY

## 2019-08-05 PROCEDURE — 6360000002 HC RX W HCPCS: Performed by: NURSE ANESTHETIST, CERTIFIED REGISTERED

## 2019-08-05 PROCEDURE — S2900 ROBOTIC SURGICAL SYSTEM: HCPCS | Performed by: OBSTETRICS & GYNECOLOGY

## 2019-08-05 PROCEDURE — 2720000010 HC SURG SUPPLY STERILE: Performed by: OBSTETRICS & GYNECOLOGY

## 2019-08-05 PROCEDURE — 6370000000 HC RX 637 (ALT 250 FOR IP): Performed by: OBSTETRICS & GYNECOLOGY

## 2019-08-05 PROCEDURE — 2580000003 HC RX 258: Performed by: NURSE ANESTHETIST, CERTIFIED REGISTERED

## 2019-08-05 PROCEDURE — 6360000002 HC RX W HCPCS: Performed by: ANESTHESIOLOGY

## 2019-08-05 PROCEDURE — 2500000003 HC RX 250 WO HCPCS: Performed by: OBSTETRICS & GYNECOLOGY

## 2019-08-05 PROCEDURE — 7100000001 HC PACU RECOVERY - ADDTL 15 MIN: Performed by: OBSTETRICS & GYNECOLOGY

## 2019-08-05 PROCEDURE — 7100000000 HC PACU RECOVERY - FIRST 15 MIN: Performed by: OBSTETRICS & GYNECOLOGY

## 2019-08-05 PROCEDURE — 2709999900 HC NON-CHARGEABLE SUPPLY: Performed by: OBSTETRICS & GYNECOLOGY

## 2019-08-05 PROCEDURE — 6360000002 HC RX W HCPCS: Performed by: OBSTETRICS & GYNECOLOGY

## 2019-08-05 PROCEDURE — 2709999900 HC NON-CHARGEABLE SUPPLY

## 2019-08-05 PROCEDURE — 36415 COLL VENOUS BLD VENIPUNCTURE: CPT

## 2019-08-05 PROCEDURE — 2500000003 HC RX 250 WO HCPCS: Performed by: NURSE ANESTHETIST, CERTIFIED REGISTERED

## 2019-08-05 PROCEDURE — 86901 BLOOD TYPING SEROLOGIC RH(D): CPT

## 2019-08-05 PROCEDURE — 86900 BLOOD TYPING SEROLOGIC ABO: CPT

## 2019-08-05 PROCEDURE — 81025 URINE PREGNANCY TEST: CPT

## 2019-08-05 PROCEDURE — 88307 TISSUE EXAM BY PATHOLOGIST: CPT

## 2019-08-05 PROCEDURE — 94760 N-INVAS EAR/PLS OXIMETRY 1: CPT

## 2019-08-05 PROCEDURE — 86850 RBC ANTIBODY SCREEN: CPT

## 2019-08-05 PROCEDURE — 3700000001 HC ADD 15 MINUTES (ANESTHESIA): Performed by: OBSTETRICS & GYNECOLOGY

## 2019-08-05 PROCEDURE — 82565 ASSAY OF CREATININE: CPT

## 2019-08-05 PROCEDURE — 3600000019 HC SURGERY ROBOT ADDTL 15MIN: Performed by: OBSTETRICS & GYNECOLOGY

## 2019-08-05 PROCEDURE — 3700000000 HC ANESTHESIA ATTENDED CARE: Performed by: OBSTETRICS & GYNECOLOGY

## 2019-08-05 RX ORDER — HYDROMORPHONE HCL 110MG/55ML
PATIENT CONTROLLED ANALGESIA SYRINGE INTRAVENOUS PRN
Status: DISCONTINUED | OUTPATIENT
Start: 2019-08-05 | End: 2019-08-05 | Stop reason: SDUPTHER

## 2019-08-05 RX ORDER — FENTANYL CITRATE 50 UG/ML
50 INJECTION, SOLUTION INTRAMUSCULAR; INTRAVENOUS EVERY 5 MIN PRN
Status: DISCONTINUED | OUTPATIENT
Start: 2019-08-05 | End: 2019-08-05 | Stop reason: HOSPADM

## 2019-08-05 RX ORDER — FENTANYL CITRATE 50 UG/ML
INJECTION, SOLUTION INTRAMUSCULAR; INTRAVENOUS PRN
Status: DISCONTINUED | OUTPATIENT
Start: 2019-08-05 | End: 2019-08-05 | Stop reason: SDUPTHER

## 2019-08-05 RX ORDER — PROMETHAZINE HYDROCHLORIDE 25 MG/ML
6.25 INJECTION, SOLUTION INTRAMUSCULAR; INTRAVENOUS
Status: COMPLETED | OUTPATIENT
Start: 2019-08-05 | End: 2019-08-05

## 2019-08-05 RX ORDER — KETOROLAC TROMETHAMINE 30 MG/ML
30 INJECTION, SOLUTION INTRAMUSCULAR; INTRAVENOUS EVERY 6 HOURS
Status: DISCONTINUED | OUTPATIENT
Start: 2019-08-05 | End: 2019-08-06

## 2019-08-05 RX ORDER — DEXAMETHASONE SODIUM PHOSPHATE 4 MG/ML
INJECTION, SOLUTION INTRA-ARTICULAR; INTRALESIONAL; INTRAMUSCULAR; INTRAVENOUS; SOFT TISSUE PRN
Status: DISCONTINUED | OUTPATIENT
Start: 2019-08-05 | End: 2019-08-05 | Stop reason: SDUPTHER

## 2019-08-05 RX ORDER — SODIUM CHLORIDE 0.9 % (FLUSH) 0.9 %
10 SYRINGE (ML) INJECTION PRN
Status: DISCONTINUED | OUTPATIENT
Start: 2019-08-05 | End: 2019-08-06 | Stop reason: HOSPADM

## 2019-08-05 RX ORDER — GLYCOPYRROLATE 1 MG/5 ML
SYRINGE (ML) INTRAVENOUS PRN
Status: DISCONTINUED | OUTPATIENT
Start: 2019-08-05 | End: 2019-08-05 | Stop reason: SDUPTHER

## 2019-08-05 RX ORDER — AMITRIPTYLINE HYDROCHLORIDE 10 MG/1
10 TABLET, FILM COATED ORAL NIGHTLY
Status: DISCONTINUED | OUTPATIENT
Start: 2019-08-05 | End: 2019-08-06 | Stop reason: HOSPADM

## 2019-08-05 RX ORDER — DOCUSATE SODIUM 100 MG/1
100 CAPSULE, LIQUID FILLED ORAL 2 TIMES DAILY
Status: DISCONTINUED | OUTPATIENT
Start: 2019-08-05 | End: 2019-08-06 | Stop reason: HOSPADM

## 2019-08-05 RX ORDER — MEPERIDINE HYDROCHLORIDE 25 MG/ML
12.5 INJECTION INTRAMUSCULAR; INTRAVENOUS; SUBCUTANEOUS EVERY 5 MIN PRN
Status: DISCONTINUED | OUTPATIENT
Start: 2019-08-05 | End: 2019-08-05 | Stop reason: HOSPADM

## 2019-08-05 RX ORDER — CLINDAMYCIN PHOSPHATE 900 MG/50ML
900 INJECTION INTRAVENOUS
Status: COMPLETED | OUTPATIENT
Start: 2019-08-05 | End: 2019-08-05

## 2019-08-05 RX ORDER — PROPOFOL 10 MG/ML
INJECTION, EMULSION INTRAVENOUS PRN
Status: DISCONTINUED | OUTPATIENT
Start: 2019-08-05 | End: 2019-08-05 | Stop reason: SDUPTHER

## 2019-08-05 RX ORDER — FENTANYL CITRATE 50 UG/ML
25 INJECTION, SOLUTION INTRAMUSCULAR; INTRAVENOUS EVERY 5 MIN PRN
Status: DISCONTINUED | OUTPATIENT
Start: 2019-08-05 | End: 2019-08-05 | Stop reason: HOSPADM

## 2019-08-05 RX ORDER — LIDOCAINE HCL/PF 100 MG/5ML
SYRINGE (ML) INJECTION PRN
Status: DISCONTINUED | OUTPATIENT
Start: 2019-08-05 | End: 2019-08-05 | Stop reason: SDUPTHER

## 2019-08-05 RX ORDER — ROCURONIUM BROMIDE 10 MG/ML
INJECTION, SOLUTION INTRAVENOUS PRN
Status: DISCONTINUED | OUTPATIENT
Start: 2019-08-05 | End: 2019-08-05 | Stop reason: SDUPTHER

## 2019-08-05 RX ORDER — BUPIVACAINE HYDROCHLORIDE 5 MG/ML
INJECTION, SOLUTION EPIDURAL; INTRACAUDAL PRN
Status: DISCONTINUED | OUTPATIENT
Start: 2019-08-05 | End: 2019-08-05 | Stop reason: ALTCHOICE

## 2019-08-05 RX ORDER — ACETAMINOPHEN 325 MG/1
650 TABLET ORAL EVERY 4 HOURS PRN
Status: DISCONTINUED | OUTPATIENT
Start: 2019-08-05 | End: 2019-08-06 | Stop reason: HOSPADM

## 2019-08-05 RX ORDER — ONDANSETRON 2 MG/ML
INJECTION INTRAMUSCULAR; INTRAVENOUS PRN
Status: DISCONTINUED | OUTPATIENT
Start: 2019-08-05 | End: 2019-08-05 | Stop reason: SDUPTHER

## 2019-08-05 RX ORDER — ONDANSETRON 2 MG/ML
8 INJECTION INTRAMUSCULAR; INTRAVENOUS EVERY 8 HOURS PRN
Status: DISCONTINUED | OUTPATIENT
Start: 2019-08-05 | End: 2019-08-05 | Stop reason: HOSPADM

## 2019-08-05 RX ORDER — ONDANSETRON 2 MG/ML
4 INJECTION INTRAMUSCULAR; INTRAVENOUS EVERY 6 HOURS PRN
Status: DISCONTINUED | OUTPATIENT
Start: 2019-08-05 | End: 2019-08-06 | Stop reason: HOSPADM

## 2019-08-05 RX ORDER — MORPHINE SULFATE 2 MG/ML
2 INJECTION, SOLUTION INTRAMUSCULAR; INTRAVENOUS
Status: DISCONTINUED | OUTPATIENT
Start: 2019-08-05 | End: 2019-08-06 | Stop reason: HOSPADM

## 2019-08-05 RX ORDER — NEOSTIGMINE METHYLSULFATE 1 MG/ML
INJECTION, SOLUTION INTRAVENOUS PRN
Status: DISCONTINUED | OUTPATIENT
Start: 2019-08-05 | End: 2019-08-05 | Stop reason: SDUPTHER

## 2019-08-05 RX ORDER — SODIUM CHLORIDE 0.9 % (FLUSH) 0.9 %
10 SYRINGE (ML) INJECTION EVERY 12 HOURS SCHEDULED
Status: DISCONTINUED | OUTPATIENT
Start: 2019-08-05 | End: 2019-08-06 | Stop reason: HOSPADM

## 2019-08-05 RX ORDER — CITALOPRAM 20 MG/1
20 TABLET ORAL DAILY
Status: DISCONTINUED | OUTPATIENT
Start: 2019-08-05 | End: 2019-08-06 | Stop reason: HOSPADM

## 2019-08-05 RX ORDER — SODIUM CHLORIDE, SODIUM LACTATE, POTASSIUM CHLORIDE, CALCIUM CHLORIDE 600; 310; 30; 20 MG/100ML; MG/100ML; MG/100ML; MG/100ML
INJECTION, SOLUTION INTRAVENOUS CONTINUOUS
Status: DISCONTINUED | OUTPATIENT
Start: 2019-08-05 | End: 2019-08-06

## 2019-08-05 RX ORDER — LABETALOL 20 MG/4 ML (5 MG/ML) INTRAVENOUS SYRINGE
5 EVERY 10 MIN PRN
Status: DISCONTINUED | OUTPATIENT
Start: 2019-08-05 | End: 2019-08-05 | Stop reason: HOSPADM

## 2019-08-05 RX ORDER — SODIUM CHLORIDE 9 MG/ML
INJECTION, SOLUTION INTRAVENOUS CONTINUOUS PRN
Status: DISCONTINUED | OUTPATIENT
Start: 2019-08-05 | End: 2019-08-05 | Stop reason: SDUPTHER

## 2019-08-05 RX ORDER — SODIUM CHLORIDE, SODIUM LACTATE, POTASSIUM CHLORIDE, CALCIUM CHLORIDE 600; 310; 30; 20 MG/100ML; MG/100ML; MG/100ML; MG/100ML
INJECTION, SOLUTION INTRAVENOUS CONTINUOUS
Status: DISCONTINUED | OUTPATIENT
Start: 2019-08-05 | End: 2019-08-05

## 2019-08-05 RX ORDER — HYDROCODONE BITARTRATE AND ACETAMINOPHEN 5; 325 MG/1; MG/1
1 TABLET ORAL EVERY 4 HOURS PRN
Status: DISCONTINUED | OUTPATIENT
Start: 2019-08-05 | End: 2019-08-06 | Stop reason: HOSPADM

## 2019-08-05 RX ORDER — MORPHINE SULFATE 4 MG/ML
4 INJECTION, SOLUTION INTRAMUSCULAR; INTRAVENOUS
Status: DISCONTINUED | OUTPATIENT
Start: 2019-08-05 | End: 2019-08-06 | Stop reason: HOSPADM

## 2019-08-05 RX ORDER — ONDANSETRON 2 MG/ML
4 INJECTION INTRAMUSCULAR; INTRAVENOUS
Status: DISCONTINUED | OUTPATIENT
Start: 2019-08-05 | End: 2019-08-05 | Stop reason: HOSPADM

## 2019-08-05 RX ORDER — HYDROCODONE BITARTRATE AND ACETAMINOPHEN 5; 325 MG/1; MG/1
2 TABLET ORAL EVERY 4 HOURS PRN
Status: DISCONTINUED | OUTPATIENT
Start: 2019-08-05 | End: 2019-08-06 | Stop reason: HOSPADM

## 2019-08-05 RX ORDER — MIDAZOLAM HYDROCHLORIDE 1 MG/ML
INJECTION INTRAMUSCULAR; INTRAVENOUS PRN
Status: DISCONTINUED | OUTPATIENT
Start: 2019-08-05 | End: 2019-08-05 | Stop reason: SDUPTHER

## 2019-08-05 RX ORDER — ACETAMINOPHEN 325 MG/1
650 TABLET ORAL EVERY 6 HOURS PRN
COMMUNITY

## 2019-08-05 RX ADMIN — SODIUM CHLORIDE, POTASSIUM CHLORIDE, SODIUM LACTATE AND CALCIUM CHLORIDE: 600; 310; 30; 20 INJECTION, SOLUTION INTRAVENOUS at 11:45

## 2019-08-05 RX ADMIN — FENTANYL CITRATE 100 MCG: 50 INJECTION INTRAMUSCULAR; INTRAVENOUS at 14:04

## 2019-08-05 RX ADMIN — AMITRIPTYLINE HYDROCHLORIDE 10 MG: 10 TABLET, FILM COATED ORAL at 21:11

## 2019-08-05 RX ADMIN — HYDROMORPHONE HYDROCHLORIDE 0.5 MG: 2 INJECTION INTRAMUSCULAR; INTRAVENOUS; SUBCUTANEOUS at 14:59

## 2019-08-05 RX ADMIN — Medication 0.8 MG: at 16:24

## 2019-08-05 RX ADMIN — HYDROMORPHONE HYDROCHLORIDE 0.5 MG: 1 INJECTION, SOLUTION INTRAMUSCULAR; INTRAVENOUS; SUBCUTANEOUS at 16:51

## 2019-08-05 RX ADMIN — SODIUM CHLORIDE, POTASSIUM CHLORIDE, SODIUM LACTATE AND CALCIUM CHLORIDE: 600; 310; 30; 20 INJECTION, SOLUTION INTRAVENOUS at 22:34

## 2019-08-05 RX ADMIN — ROCURONIUM BROMIDE 10 MG: 10 INJECTION INTRAVENOUS at 15:40

## 2019-08-05 RX ADMIN — DEXAMETHASONE SODIUM PHOSPHATE 8 MG: 4 INJECTION, SOLUTION INTRAMUSCULAR; INTRAVENOUS at 14:20

## 2019-08-05 RX ADMIN — HYDROMORPHONE HYDROCHLORIDE 0.5 MG: 2 INJECTION INTRAMUSCULAR; INTRAVENOUS; SUBCUTANEOUS at 15:15

## 2019-08-05 RX ADMIN — MORPHINE SULFATE 2 MG: 2 INJECTION, SOLUTION INTRAMUSCULAR; INTRAVENOUS at 20:17

## 2019-08-05 RX ADMIN — Medication 60 MG: at 14:04

## 2019-08-05 RX ADMIN — MIDAZOLAM HYDROCHLORIDE 2 MG: 1 INJECTION, SOLUTION INTRAMUSCULAR; INTRAVENOUS at 14:00

## 2019-08-05 RX ADMIN — Medication 10 ML: at 20:23

## 2019-08-05 RX ADMIN — GENTAMICIN SULFATE 80 MG: 40 INJECTION, SOLUTION INTRAMUSCULAR; INTRAVENOUS at 14:25

## 2019-08-05 RX ADMIN — MORPHINE SULFATE 4 MG: 4 INJECTION INTRAVENOUS at 22:30

## 2019-08-05 RX ADMIN — SODIUM CHLORIDE, POTASSIUM CHLORIDE, SODIUM LACTATE AND CALCIUM CHLORIDE: 600; 310; 30; 20 INJECTION, SOLUTION INTRAVENOUS at 16:28

## 2019-08-05 RX ADMIN — NEOSTIGMINE METHYLSULFATE 5 MG: 1 INJECTION, SOLUTION INTRAVENOUS at 16:24

## 2019-08-05 RX ADMIN — HYDROMORPHONE HYDROCHLORIDE 0.5 MG: 1 INJECTION, SOLUTION INTRAMUSCULAR; INTRAVENOUS; SUBCUTANEOUS at 17:18

## 2019-08-05 RX ADMIN — DOCUSATE SODIUM 100 MG: 100 CAPSULE, LIQUID FILLED ORAL at 20:23

## 2019-08-05 RX ADMIN — CITALOPRAM 20 MG: 20 TABLET, FILM COATED ORAL at 20:28

## 2019-08-05 RX ADMIN — PROMETHAZINE HYDROCHLORIDE 6.25 MG: 25 INJECTION INTRAMUSCULAR; INTRAVENOUS at 17:14

## 2019-08-05 RX ADMIN — FENTANYL CITRATE 50 MCG: 50 INJECTION INTRAMUSCULAR; INTRAVENOUS at 14:24

## 2019-08-05 RX ADMIN — SODIUM CHLORIDE: 9 INJECTION, SOLUTION INTRAVENOUS at 13:57

## 2019-08-05 RX ADMIN — ROCURONIUM BROMIDE 50 MG: 10 INJECTION INTRAVENOUS at 14:04

## 2019-08-05 RX ADMIN — PROPOFOL 40 MG: 10 INJECTION, EMULSION INTRAVENOUS at 14:24

## 2019-08-05 RX ADMIN — KETOROLAC TROMETHAMINE 30 MG: 30 INJECTION, SOLUTION INTRAMUSCULAR at 18:55

## 2019-08-05 RX ADMIN — FENTANYL CITRATE 50 MCG: 50 INJECTION INTRAMUSCULAR; INTRAVENOUS at 15:29

## 2019-08-05 RX ADMIN — PROPOFOL 160 MG: 10 INJECTION, EMULSION INTRAVENOUS at 14:04

## 2019-08-05 RX ADMIN — CLINDAMYCIN PHOSPHATE 900 MG: 900 INJECTION, SOLUTION INTRAVENOUS at 14:15

## 2019-08-05 RX ADMIN — ONDANSETRON HYDROCHLORIDE 4 MG: 4 INJECTION, SOLUTION INTRAMUSCULAR; INTRAVENOUS at 14:20

## 2019-08-05 ASSESSMENT — PULMONARY FUNCTION TESTS
PIF_VALUE: 0
PIF_VALUE: 16
PIF_VALUE: 0
PIF_VALUE: 30
PIF_VALUE: 0
PIF_VALUE: 30
PIF_VALUE: 0
PIF_VALUE: 16
PIF_VALUE: 29
PIF_VALUE: 0
PIF_VALUE: 29
PIF_VALUE: 29
PIF_VALUE: 0
PIF_VALUE: 29
PIF_VALUE: 0
PIF_VALUE: 27
PIF_VALUE: 30
PIF_VALUE: 0
PIF_VALUE: 0
PIF_VALUE: 17
PIF_VALUE: 0
PIF_VALUE: 29
PIF_VALUE: 29
PIF_VALUE: 17
PIF_VALUE: 17
PIF_VALUE: 29
PIF_VALUE: 0
PIF_VALUE: 29
PIF_VALUE: 23
PIF_VALUE: 0
PIF_VALUE: 0
PIF_VALUE: 12
PIF_VALUE: 0
PIF_VALUE: 29
PIF_VALUE: 0
PIF_VALUE: 29
PIF_VALUE: 0
PIF_VALUE: 0
PIF_VALUE: 26
PIF_VALUE: 0
PIF_VALUE: 29
PIF_VALUE: 8
PIF_VALUE: 0
PIF_VALUE: 17
PIF_VALUE: 0
PIF_VALUE: 0
PIF_VALUE: 17
PIF_VALUE: 0
PIF_VALUE: 30
PIF_VALUE: 0
PIF_VALUE: 19
PIF_VALUE: 29
PIF_VALUE: 2
PIF_VALUE: 0
PIF_VALUE: 18
PIF_VALUE: 0
PIF_VALUE: 30
PIF_VALUE: 0
PIF_VALUE: 0
PIF_VALUE: 29
PIF_VALUE: 23
PIF_VALUE: 30
PIF_VALUE: 0
PIF_VALUE: 30
PIF_VALUE: 23
PIF_VALUE: 29
PIF_VALUE: 25
PIF_VALUE: 29
PIF_VALUE: 17
PIF_VALUE: 0
PIF_VALUE: 29
PIF_VALUE: 30
PIF_VALUE: 0
PIF_VALUE: 28
PIF_VALUE: 30
PIF_VALUE: 0
PIF_VALUE: 19
PIF_VALUE: 30
PIF_VALUE: 0
PIF_VALUE: 0
PIF_VALUE: 29
PIF_VALUE: 19
PIF_VALUE: 29
PIF_VALUE: 1
PIF_VALUE: 29
PIF_VALUE: 16
PIF_VALUE: 19
PIF_VALUE: 0
PIF_VALUE: 29
PIF_VALUE: 18
PIF_VALUE: 0
PIF_VALUE: 0
PIF_VALUE: 29
PIF_VALUE: 28
PIF_VALUE: 10
PIF_VALUE: 0
PIF_VALUE: 17
PIF_VALUE: 0
PIF_VALUE: 19
PIF_VALUE: 30
PIF_VALUE: 29
PIF_VALUE: 0
PIF_VALUE: 28
PIF_VALUE: 0
PIF_VALUE: 1
PIF_VALUE: 29
PIF_VALUE: 29
PIF_VALUE: 0
PIF_VALUE: 0
PIF_VALUE: 29
PIF_VALUE: 0
PIF_VALUE: 17
PIF_VALUE: 30
PIF_VALUE: 10
PIF_VALUE: 0
PIF_VALUE: 17
PIF_VALUE: 0
PIF_VALUE: 19
PIF_VALUE: 0
PIF_VALUE: 19
PIF_VALUE: 30
PIF_VALUE: 0
PIF_VALUE: 0
PIF_VALUE: 29
PIF_VALUE: 0
PIF_VALUE: 29
PIF_VALUE: 29
PIF_VALUE: 18
PIF_VALUE: 0
PIF_VALUE: 0
PIF_VALUE: 30
PIF_VALUE: 0
PIF_VALUE: 0
PIF_VALUE: 29
PIF_VALUE: 0
PIF_VALUE: 0
PIF_VALUE: 30
PIF_VALUE: 29
PIF_VALUE: 0
PIF_VALUE: 29
PIF_VALUE: 0
PIF_VALUE: 29
PIF_VALUE: 29
PIF_VALUE: 0
PIF_VALUE: 29
PIF_VALUE: 0
PIF_VALUE: 0
PIF_VALUE: 24
PIF_VALUE: 17
PIF_VALUE: 29
PIF_VALUE: 0
PIF_VALUE: 20
PIF_VALUE: 18
PIF_VALUE: 0
PIF_VALUE: 0
PIF_VALUE: 16
PIF_VALUE: 29
PIF_VALUE: 0
PIF_VALUE: 28
PIF_VALUE: 0
PIF_VALUE: 29
PIF_VALUE: 0
PIF_VALUE: 29
PIF_VALUE: 19
PIF_VALUE: 29
PIF_VALUE: 0
PIF_VALUE: 29
PIF_VALUE: 30
PIF_VALUE: 20
PIF_VALUE: 29
PIF_VALUE: 0
PIF_VALUE: 0
PIF_VALUE: 29
PIF_VALUE: 29
PIF_VALUE: 23
PIF_VALUE: 1
PIF_VALUE: 0
PIF_VALUE: 0
PIF_VALUE: 30
PIF_VALUE: 28
PIF_VALUE: 0
PIF_VALUE: 19
PIF_VALUE: 0
PIF_VALUE: 17
PIF_VALUE: 0
PIF_VALUE: 29
PIF_VALUE: 19
PIF_VALUE: 0
PIF_VALUE: 0
PIF_VALUE: 16
PIF_VALUE: 23
PIF_VALUE: 28
PIF_VALUE: 0
PIF_VALUE: 29
PIF_VALUE: 0
PIF_VALUE: 29
PIF_VALUE: 0
PIF_VALUE: 0
PIF_VALUE: 29
PIF_VALUE: 0
PIF_VALUE: 19
PIF_VALUE: 0
PIF_VALUE: 29
PIF_VALUE: 28
PIF_VALUE: 17
PIF_VALUE: 29
PIF_VALUE: 29
PIF_VALUE: 30
PIF_VALUE: 30
PIF_VALUE: 29
PIF_VALUE: 29
PIF_VALUE: 2
PIF_VALUE: 29
PIF_VALUE: 19
PIF_VALUE: 0
PIF_VALUE: 19
PIF_VALUE: 0
PIF_VALUE: 29
PIF_VALUE: 30
PIF_VALUE: 29
PIF_VALUE: 0
PIF_VALUE: 19
PIF_VALUE: 0
PIF_VALUE: 30
PIF_VALUE: 0
PIF_VALUE: 29
PIF_VALUE: 0
PIF_VALUE: 30
PIF_VALUE: 29
PIF_VALUE: 17
PIF_VALUE: 0
PIF_VALUE: 0
PIF_VALUE: 29
PIF_VALUE: 29
PIF_VALUE: 0
PIF_VALUE: 17
PIF_VALUE: 29
PIF_VALUE: 17
PIF_VALUE: 0
PIF_VALUE: 30
PIF_VALUE: 19
PIF_VALUE: 28
PIF_VALUE: 29
PIF_VALUE: 0
PIF_VALUE: 0
PIF_VALUE: 17
PIF_VALUE: 0
PIF_VALUE: 29
PIF_VALUE: 30
PIF_VALUE: 30
PIF_VALUE: 29
PIF_VALUE: 0
PIF_VALUE: 0
PIF_VALUE: 29
PIF_VALUE: 0
PIF_VALUE: 29
PIF_VALUE: 29
PIF_VALUE: 30
PIF_VALUE: 19
PIF_VALUE: 0
PIF_VALUE: 29

## 2019-08-05 ASSESSMENT — PAIN SCALES - GENERAL
PAINLEVEL_OUTOF10: 8
PAINLEVEL_OUTOF10: 5
PAINLEVEL_OUTOF10: 1
PAINLEVEL_OUTOF10: 3
PAINLEVEL_OUTOF10: 3
PAINLEVEL_OUTOF10: 6
PAINLEVEL_OUTOF10: 6
PAINLEVEL_OUTOF10: 7
PAINLEVEL_OUTOF10: 1
PAINLEVEL_OUTOF10: 6
PAINLEVEL_OUTOF10: 2
PAINLEVEL_OUTOF10: 7

## 2019-08-05 ASSESSMENT — PAIN DESCRIPTION - DESCRIPTORS: DESCRIPTORS: CRAMPING

## 2019-08-05 ASSESSMENT — PAIN DESCRIPTION - ORIENTATION
ORIENTATION: LOWER
ORIENTATION: LEFT;MID;RIGHT

## 2019-08-05 ASSESSMENT — PAIN - FUNCTIONAL ASSESSMENT: PAIN_FUNCTIONAL_ASSESSMENT: 0-10

## 2019-08-05 ASSESSMENT — PAIN DESCRIPTION - FREQUENCY
FREQUENCY: CONTINUOUS
FREQUENCY: CONTINUOUS

## 2019-08-05 ASSESSMENT — PAIN DESCRIPTION - LOCATION
LOCATION: ABDOMEN

## 2019-08-05 ASSESSMENT — PAIN DESCRIPTION - PAIN TYPE
TYPE: SURGICAL PAIN

## 2019-08-05 ASSESSMENT — PAIN DESCRIPTION - ONSET: ONSET: ON-GOING

## 2019-08-05 ASSESSMENT — PAIN DESCRIPTION - DIRECTION: RADIATING_TOWARDS: RIGHT

## 2019-08-05 NOTE — H&P
injection, , , PRN, Lorenzo Liz MD, 30 mL at 19 1626    OB History: -2 SVDs    Gyn History: +h/o abnormal pap smear-NENO I-recent pap smear was normal, denies h/o STDs. Past Medical History:   Past Medical History:   Diagnosis Date    Abnormal Pap smear and cervical HPV (human papillomavirus)     Anxiety     Asthma     Depression     PTSD (post-traumatic stress disorder)     Smoker     Substance abuse (HCC)     history of    Tonsillitis     recurrent       Past Surgical History:   Past Surgical History:   Procedure Laterality Date    DENTAL SURGERY      FOOT SURGERY Right 14    TUBAL LIGATION  2012       Social History:   Social History     Tobacco Use   Smoking Status Current Every Day Smoker    Packs/day: 0.50    Years: 10.00    Pack years: 5.00    Types: Cigarettes    Last attempt to quit: 12/15/2015    Years since quitting: 3.6   Smokeless Tobacco Never Used        Family History: Noncontributory; Denies h/o cancer. ROS:  Negative except as stated in HPI    PE:  Vitals:    19 1725   BP: 115/68   Pulse: 63   Resp: 15   Temp:    SpO2: 99%       General: well nourished, well developed, in no acute distress  CV: Normal heart sounds  Resp: breathing unlabored  Abdomen: Nontender, no rebound, no guarding  Pelvic: deferred to the OR    Labs:   Lab Results   Component Value Date    WBC 7.8 2019    HGB 13.9 2019    HCT 41.9 2019    MCV 97.7 2019     2019 pap: negative  19 EMB: negative for hyperplasia or malignancy    Imagin19 u/s: TERINE POSITION: retroverted   UTERINE TEXTURE: heterogeneous   UTERUS: L: 7.6 centimeters W: 4.8 centimeters AP: 4.3 centimeters   ESTIMATED UTERINE VOLUME: 82 ML   UTERINE LININ.7 millimeters   UTERINE CAVITY: There are small calcifications seen at posterior border of endometrium.    CUL DE SAC: no fluid present   PRESENCE OF FIBROIDS: negative   RIGHT OVARY: 2.9

## 2019-08-06 ENCOUNTER — TELEPHONE (OUTPATIENT)
Dept: FAMILY MEDICINE CLINIC | Age: 34
End: 2019-08-06

## 2019-08-06 VITALS
HEIGHT: 67 IN | TEMPERATURE: 98.2 F | BODY MASS INDEX: 31.07 KG/M2 | OXYGEN SATURATION: 97 % | SYSTOLIC BLOOD PRESSURE: 117 MMHG | DIASTOLIC BLOOD PRESSURE: 72 MMHG | WEIGHT: 197.97 LBS | HEART RATE: 70 BPM | RESPIRATION RATE: 16 BRPM

## 2019-08-06 PROCEDURE — 2709999900 HC NON-CHARGEABLE SUPPLY

## 2019-08-06 PROCEDURE — 6370000000 HC RX 637 (ALT 250 FOR IP): Performed by: OBSTETRICS & GYNECOLOGY

## 2019-08-06 PROCEDURE — 6360000002 HC RX W HCPCS: Performed by: OBSTETRICS & GYNECOLOGY

## 2019-08-06 PROCEDURE — 2580000003 HC RX 258: Performed by: OBSTETRICS & GYNECOLOGY

## 2019-08-06 RX ORDER — OXYCODONE HYDROCHLORIDE AND ACETAMINOPHEN 5; 325 MG/1; MG/1
1 TABLET ORAL EVERY 6 HOURS PRN
Qty: 28 TABLET | Refills: 0 | Status: SHIPPED | OUTPATIENT
Start: 2019-08-06 | End: 2019-08-13

## 2019-08-06 RX ORDER — IBUPROFEN 800 MG/1
800 TABLET ORAL EVERY 8 HOURS PRN
Status: DISCONTINUED | OUTPATIENT
Start: 2019-08-06 | End: 2019-08-06 | Stop reason: HOSPADM

## 2019-08-06 RX ORDER — KETOROLAC TROMETHAMINE 10 MG/1
10 TABLET, FILM COATED ORAL EVERY 6 HOURS PRN
Qty: 20 TABLET | Refills: 0 | Status: SHIPPED | OUTPATIENT
Start: 2019-08-06 | End: 2020-02-13 | Stop reason: ALTCHOICE

## 2019-08-06 RX ADMIN — DOCUSATE SODIUM 100 MG: 100 CAPSULE, LIQUID FILLED ORAL at 10:02

## 2019-08-06 RX ADMIN — MORPHINE SULFATE 2 MG: 2 INJECTION, SOLUTION INTRAMUSCULAR; INTRAVENOUS at 05:50

## 2019-08-06 RX ADMIN — MORPHINE SULFATE 2 MG: 2 INJECTION, SOLUTION INTRAMUSCULAR; INTRAVENOUS at 00:26

## 2019-08-06 RX ADMIN — KETOROLAC TROMETHAMINE 30 MG: 30 INJECTION, SOLUTION INTRAMUSCULAR at 01:46

## 2019-08-06 RX ADMIN — IBUPROFEN 800 MG: 800 TABLET, FILM COATED ORAL at 14:01

## 2019-08-06 RX ADMIN — SODIUM CHLORIDE, POTASSIUM CHLORIDE, SODIUM LACTATE AND CALCIUM CHLORIDE: 600; 310; 30; 20 INJECTION, SOLUTION INTRAVENOUS at 06:30

## 2019-08-06 RX ADMIN — KETOROLAC TROMETHAMINE 30 MG: 30 INJECTION, SOLUTION INTRAMUSCULAR at 07:43

## 2019-08-06 RX ADMIN — CITALOPRAM 20 MG: 20 TABLET, FILM COATED ORAL at 10:03

## 2019-08-06 ASSESSMENT — PAIN DESCRIPTION - PAIN TYPE
TYPE: SURGICAL PAIN
TYPE: SURGICAL PAIN

## 2019-08-06 ASSESSMENT — PAIN DESCRIPTION - DIRECTION
RADIATING_TOWARDS: R
RADIATING_TOWARDS: R

## 2019-08-06 ASSESSMENT — PAIN SCALES - GENERAL
PAINLEVEL_OUTOF10: 6
PAINLEVEL_OUTOF10: 3
PAINLEVEL_OUTOF10: 5
PAINLEVEL_OUTOF10: 6
PAINLEVEL_OUTOF10: 6
PAINLEVEL_OUTOF10: 4

## 2019-08-06 ASSESSMENT — PAIN DESCRIPTION - FREQUENCY
FREQUENCY: CONTINUOUS
FREQUENCY: CONTINUOUS

## 2019-08-06 ASSESSMENT — PAIN DESCRIPTION - PROGRESSION
CLINICAL_PROGRESSION: GRADUALLY IMPROVING

## 2019-08-06 ASSESSMENT — PAIN - FUNCTIONAL ASSESSMENT
PAIN_FUNCTIONAL_ASSESSMENT: PREVENTS OR INTERFERES SOME ACTIVE ACTIVITIES AND ADLS
PAIN_FUNCTIONAL_ASSESSMENT: PREVENTS OR INTERFERES SOME ACTIVE ACTIVITIES AND ADLS

## 2019-08-06 ASSESSMENT — PAIN DESCRIPTION - ONSET
ONSET: ON-GOING
ONSET: ON-GOING

## 2019-08-06 ASSESSMENT — PAIN DESCRIPTION - LOCATION
LOCATION: ABDOMEN
LOCATION: ABDOMEN

## 2019-08-06 ASSESSMENT — PAIN DESCRIPTION - ORIENTATION
ORIENTATION: MID
ORIENTATION: MID

## 2019-08-06 NOTE — CARE COORDINATION
CASE MANAGEMENT OBSERVATION NOTE       8/6/19, 9:33 AM    Amelia Herrera       Admitted from: PACU 8/5/2019/ 1002   Location: [facility]-A Reason for admit: S/P hysterectomy [Z90.710]  S/P hysterectomy [Z90.710]   Admit order signed?: yes    Procedure: 8/05/819 RA TLH, b/l salpingectomy    Pertinent Info/Orders/Treatment Plan: Coleman catheter is out. IV fluids, Toradol scheduled, prn Norco or Morphine for pain, wound care, SCD's, discharge planning. PCP: Shaila Lemos APRN - CNP    Discharge Plan: Home with spouse as PTA.

## 2019-08-07 NOTE — TELEPHONE ENCOUNTER
Kristy 45 Transitions Initial Follow Up Call    Outreach made within 2 business days of discharge: Yes    Patient: Abner Bain Patient : 1985   MRN: [id number]  Reason for Admission: There are no discharge diagnoses documented for the most recent discharge. Discharge Date: 19       Spoke with: Pt    Discharge department/facility: [facility]    TCM Interactive Patient Contact:  Was patient able to fill all prescriptions: Yes  Was patient instructed to bring all medications to the follow-up visit: Yes  Is patient taking all medications as directed in the discharge summary?  Yes  Does patient understand their discharge instructions: Yes  Does patient have questions or concerns that need addressed prior to 7-14 day follow up office visit: no    Scheduled appointment with PCP within 7-14 days    Follow Up  Future Appointments   Date Time Provider Michelle Davila   2019  1:00 PM NACHO Angulo CNP [address] [place] [address] ([address] [place] [address])

## 2019-08-07 NOTE — TELEPHONE ENCOUNTER
Kristy 45 Transitions Initial Follow Up Call    Outreach made within 2 business days of discharge: Yes    Patient: Franklin Lung Patient : 1985   MRN: [id number]  Reason for Admission: There are no discharge diagnoses documented for the most recent discharge. Discharge Date: 19       Spoke with: GREGORIO for patient to return call at their earliest convenience. Discharge department/facility: [facility]    TCM Interactive Patient Contact:  Was patient able to fill all prescriptions: No: n/a  Was patient instructed to bring all medications to the follow-up visit: No: n/a  Is patient taking all medications as directed in the discharge summary?  No  Does patient understand their discharge instructions: No: n/a  Does patient have questions or concerns that need addressed prior to 7-14 day follow up office visit: no    Scheduled appointment with PCP within 7-14 days    Follow Up  Future Appointments   Date Time Provider Michelle Davila   2019  1:00 PM Yunier Alejandro, APRN - [id number] [address] ([address] [place] [address])

## 2019-08-13 ENCOUNTER — OFFICE VISIT (OUTPATIENT)
Dept: FAMILY MEDICINE CLINIC | Age: 34
End: 2019-08-13
Payer: COMMERCIAL

## 2019-08-13 VITALS
RESPIRATION RATE: 18 BRPM | TEMPERATURE: 98 F | HEIGHT: 67 IN | BODY MASS INDEX: 31.08 KG/M2 | HEART RATE: 83 BPM | SYSTOLIC BLOOD PRESSURE: 133 MMHG | DIASTOLIC BLOOD PRESSURE: 74 MMHG | WEIGHT: 198 LBS

## 2019-08-13 DIAGNOSIS — R21 RASH AND OTHER NONSPECIFIC SKIN ERUPTION: ICD-10-CM

## 2019-08-13 DIAGNOSIS — Z90.710 S/P HYSTERECTOMY: Primary | ICD-10-CM

## 2019-08-13 PROCEDURE — 1111F DSCHRG MED/CURRENT MED MERGE: CPT | Performed by: NURSE PRACTITIONER

## 2019-08-13 PROCEDURE — 99215 OFFICE O/P EST HI 40 MIN: CPT | Performed by: NURSE PRACTITIONER

## 2019-08-13 NOTE — PROGRESS NOTES
hours as needed for Pain      BIOTIN PO Take by mouth daily      Cyanocobalamin (VITAMIN B-12 PO) Take by mouth daily      citalopram (CELEXA) 20 MG tablet take 1 tablet by mouth once daily  0    amitriptyline (ELAVIL) 10 MG tablet Take 10 mg by mouth nightly      loratadine (CLARITIN) 10 MG tablet Take 1 tablet by mouth daily 30 tablet 6     No current facility-administered medications for this visit. Past Medical History:   Diagnosis Date    Abnormal Pap smear and cervical HPV (human papillomavirus)     Anxiety     Asthma     Depression     PTSD (post-traumatic stress disorder)     Smoker     Substance abuse ([facility] [address]     history of    Tonsillitis     recurrent       Past Surgical History:   Procedure Laterality Date    DENTAL SURGERY      FOOT SURGERY Right 08/26/14    HYSTERECTOMY ABDOMINAL N/A 8/5/2019    ROBOTIC HYSTER W/ BS performed by Ziyad Way MD at [address] [place] [address]  1/2012       Social History     Tobacco Use    Smoking status: Current Every Day Smoker     Packs/day: 0.50     Years: 10.00     Pack years: 5.00     Types: Cigarettes     Last attempt to quit: 12/15/2015     Years since quitting: 3.6    Smokeless tobacco: Never Used   Substance Use Topics    Alcohol use: Yes     Alcohol/week: 1.0 standard drinks     Types: 1 Glasses of wine per week     Comment: occasionally    Drug use: No     Frequency: 7.0 times per week     Types: Marijuana     Comment: past history of polysubstance abuse: cannabis, crack, cociane       Family History   Problem Relation Age of Onset    Cancer Mother         ovarian in mother  , paternal grandmother- breast cancer    Alcohol Abuse Other          family         I have reviewed the patient's past medical history, past surgical history, allergies, medications, social and family history and I have made updates where appropriate.         PHYSICAL EXAM:  /74   Pulse 83   Temp 98 °F (36.7 °C) (Oral)   Resp 18   Ht 5' 6.5\"

## 2019-09-27 ENCOUNTER — HOSPITAL ENCOUNTER (OUTPATIENT)
Dept: GENERAL RADIOLOGY | Age: 34
Discharge: HOME | End: 2019-09-27

## 2019-09-27 ENCOUNTER — HOSPITAL ENCOUNTER (OUTPATIENT)
Age: 34
Discharge: HOME | End: 2019-09-27

## 2019-09-27 DIAGNOSIS — Z02.1 PHYSICAL EXAM, PRE-EMPLOYMENT: ICD-10-CM

## 2019-11-12 ENCOUNTER — TELEPHONE (OUTPATIENT)
Dept: FAMILY MEDICINE CLINIC | Age: 34
End: 2019-11-12

## 2020-02-13 ENCOUNTER — OFFICE VISIT (OUTPATIENT)
Dept: FAMILY MEDICINE CLINIC | Age: 35
End: 2020-02-13
Payer: COMMERCIAL

## 2020-02-13 VITALS
WEIGHT: 208 LBS | SYSTOLIC BLOOD PRESSURE: 116 MMHG | DIASTOLIC BLOOD PRESSURE: 72 MMHG | TEMPERATURE: 98.3 F | HEART RATE: 76 BPM | BODY MASS INDEX: 32.65 KG/M2 | HEIGHT: 67 IN | RESPIRATION RATE: 14 BRPM

## 2020-02-13 PROBLEM — F33.1 MAJOR DEPRESSIVE DISORDER, RECURRENT EPISODE, MODERATE WITH ANXIOUS DISTRESS (HCC): Status: ACTIVE | Noted: 2020-02-13

## 2020-02-13 PROCEDURE — 4004F PT TOBACCO SCREEN RCVD TLK: CPT | Performed by: NURSE PRACTITIONER

## 2020-02-13 PROCEDURE — 99214 OFFICE O/P EST MOD 30 MIN: CPT | Performed by: NURSE PRACTITIONER

## 2020-02-13 PROCEDURE — G8417 CALC BMI ABV UP PARAM F/U: HCPCS | Performed by: NURSE PRACTITIONER

## 2020-02-13 PROCEDURE — G8484 FLU IMMUNIZE NO ADMIN: HCPCS | Performed by: NURSE PRACTITIONER

## 2020-02-13 PROCEDURE — G8427 DOCREV CUR MEDS BY ELIG CLIN: HCPCS | Performed by: NURSE PRACTITIONER

## 2020-02-13 RX ORDER — PHENTERMINE HYDROCHLORIDE 37.5 MG/1
37.5 CAPSULE ORAL EVERY MORNING
Qty: 30 CAPSULE | Refills: 0 | Status: SHIPPED | OUTPATIENT
Start: 2020-02-13 | End: 2020-03-12 | Stop reason: SDUPTHER

## 2020-02-13 RX ORDER — VARENICLINE TARTRATE 1 MG/1
1 TABLET, FILM COATED ORAL 2 TIMES DAILY
Qty: 180 TABLET | Refills: 1 | Status: SHIPPED | OUTPATIENT
Start: 2020-02-13 | End: 2020-04-13

## 2020-02-13 RX ORDER — VARENICLINE TARTRATE 25 MG
KIT ORAL
Qty: 1 BOX | Refills: 0 | Status: SHIPPED | OUTPATIENT
Start: 2020-02-13 | End: 2020-04-13

## 2020-02-13 ASSESSMENT — ENCOUNTER SYMPTOMS: RESPIRATORY NEGATIVE: 1

## 2020-02-13 NOTE — PROGRESS NOTES
[facility]  [address] [place] [address]  Dept: [phone]  Dept Fax: [phone]  Loc: Angeles Argueta is a 36 y.o. femalewho presents today for her medical conditions/complaints as noted below. Denisse Shultz c/o of Weight Management (states she gained 50 lbs in the past year. States she had a hyster in August 2019. Has been exercising, eating right with no results to losing weight ); Pharyngitis (sore throat for past month, feels it is due to smoking.  ); and Nicotine Dependence (wanting to stop smoking, has used gum, is allergic to patch, Wellbutrin give insomnia. States Marii has worked for her in the past )      HPI:      Pt here to discuss several concerns. Pt states she has been trying to loose weight. She has been eating 1500 calories a day, she has tried the keto diet, she has  Dietician at work that has been making up meal plans, she walks everyday and has gained weight 20 pounds in the last 3 months. She had bene on weight gaining meds but has stopped them without improvement. Drinks a lot of water daily, has cut out carbs and sugars. Wants to try adipex. States she smokes 1/2 pack a day, hs tired wellbutrin without improvement, states smoking patches does not work. No longer taking depression meds and stats sh feels good, no depression. HPI:    The patient presents to the office for a refill on phentermine (Adipex). This is month number 1 for this series. The patient has lost been trying to loose weight since the last visit. The patient has been prescribed phentermine as part of a weight loss regimen which also includes dietary restrictions and structured exercise program.  The patient reports compliance with the dietary restrictions is good.   The patient reports an exercise regimen which includes walking and use of a pedometer with the patient averaging 1000 steps per mouth every morning for 30 days. Dispense: 30 capsule; Refill: 0    4. Dietary counseling  1200 calories a day  Cut out carbs and sugars. Over half of visit spent in counseling. Return in about 4 weeks (around 3/12/2020) for nurse visit for weight check, me in 2 months. Reccommended tobaccocessation options including pharmacologic methods, counseled great than 3 minutesduring this visit:  Yes[]  No  []       Patient given educational materials -see patient instructions. Discussed use, benefit, and side effects of prescribedmedications. All patient questions answered. Pt voiced understanding. Reviewedhealth maintenance. Instructed to continue current medications, diet and exercise. Patient agreed with treatment plan. Follow up as directed.        Electronicallysigned by NACHO Hernandez CNP on 2/13/2020 at 5:10 PM

## 2020-02-16 ENCOUNTER — HOSPITAL ENCOUNTER (OUTPATIENT)
Age: 35
Discharge: HOME | End: 2020-02-16
Payer: COMMERCIAL

## 2020-02-16 DIAGNOSIS — R63.5 WEIGHT GAIN: ICD-10-CM

## 2020-02-16 LAB — TSH SERPL DL<=0.05 MIU/L-ACNC: 1.04 UIU/ML (ref 0.4–4.2)

## 2020-02-16 PROCEDURE — 84443 ASSAY THYROID STIM HORMONE: CPT

## 2020-02-16 PROCEDURE — 36415 COLL VENOUS BLD VENIPUNCTURE: CPT

## 2020-02-17 ENCOUNTER — TELEPHONE (OUTPATIENT)
Dept: FAMILY MEDICINE CLINIC | Age: 35
End: 2020-02-17

## 2020-02-17 NOTE — TELEPHONE ENCOUNTER
----- Message from NACHO Hager CNP sent at 2/17/2020  7:40 AM EST -----  Let pt know her thyroid labs are in normal range.

## 2020-03-12 ENCOUNTER — NURSE ONLY (OUTPATIENT)
Dept: FAMILY MEDICINE CLINIC | Age: 35
End: 2020-03-12

## 2020-03-12 VITALS — BODY MASS INDEX: 30.07 KG/M2 | WEIGHT: 192 LBS

## 2020-03-12 RX ORDER — PHENTERMINE HYDROCHLORIDE 37.5 MG/1
37.5 CAPSULE ORAL EVERY MORNING
Qty: 30 CAPSULE | Refills: 0 | Status: CANCELLED | OUTPATIENT
Start: 2020-03-12 | End: 2020-04-11

## 2020-03-12 RX ORDER — PHENTERMINE HYDROCHLORIDE 37.5 MG/1
37.5 CAPSULE ORAL EVERY MORNING
Qty: 30 CAPSULE | Refills: 0 | Status: SHIPPED | OUTPATIENT
Start: 2020-03-12 | End: 2020-04-13 | Stop reason: SDUPTHER

## 2020-04-13 ENCOUNTER — TELEMEDICINE (OUTPATIENT)
Dept: FAMILY MEDICINE CLINIC | Age: 35
End: 2020-04-13
Payer: COMMERCIAL

## 2020-04-13 VITALS — RESPIRATION RATE: 18 BRPM | BODY MASS INDEX: 28.66 KG/M2 | WEIGHT: 183 LBS

## 2020-04-13 PROCEDURE — 99213 OFFICE O/P EST LOW 20 MIN: CPT | Performed by: NURSE PRACTITIONER

## 2020-04-13 PROCEDURE — G8427 DOCREV CUR MEDS BY ELIG CLIN: HCPCS | Performed by: NURSE PRACTITIONER

## 2020-04-13 RX ORDER — PHENTERMINE HYDROCHLORIDE 37.5 MG/1
37.5 CAPSULE ORAL EVERY MORNING
Qty: 30 CAPSULE | Refills: 0 | Status: SHIPPED | OUTPATIENT
Start: 2020-04-13 | End: 2020-05-13

## 2020-04-13 NOTE — PROGRESS NOTES
2020  Visit conducted with pt at home and provider in home office  TELEHEALTH EVALUATION -- Audio/Visual (During [facility]-89 public health emergency)    HPI:    Di Moses (:  1985) has requested an audio/video evaluation for the following concern(s):    HPI:    The patient presents to the office for a refill on phentermine (Adipex). This is month number 3 for this series. The patient has lost 9 pounds since the last visit. The patient has been prescribed phentermine as part of a weight loss regimen which also includes dietary restrictions and structured exercise program.  The patient reports compliance with the dietary restrictions is good. The patient reports an exercise regimen which includes walking    Patient reports no adverse side effects from current medication regimen. ROS:  Gen: no fevers, chills, sweats. Cardiac: No chest pain, palpitations, syncope, lightheadedness. Pulmonary: No wheezes or dyspnea. GI: No nausea, vomiting, diarrhea, abdominal discomfort. Neuro: No tremors, headaches. Psych: No agitation or insomnia. States she is smoking 1/2 pack a day, did try the chantix to stop it worked initially, now she is stressed with work and coronavirus. Will try acupuncture with her chiropractor. Review of Systems    Prior to Visit Medications    Medication Sig Taking? Authorizing Provider   acetaminophen (TYLENOL) 325 MG tablet Take 650 mg by mouth every 6 hours as needed for Pain  Historical Provider, MD       Social History     Tobacco Use    Smoking status: Current Every Day Smoker     Packs/day: 0.50     Years: 16.00     Pack years: 8.00     Types: Cigarettes     Last attempt to quit: 12/15/2015     Years since quittin.3    Smokeless tobacco: Never Used   Substance Use Topics    Alcohol use:  Yes     Alcohol/week: 1.0 standard drinks     Types: 1 Glasses of wine per week     Comment: occasionally    Drug use: No     Frequency: 7.0 times per week     Types: contact this office for worsening conditions or problems, and seek emergency medical treatment and/or call 911 if deemed necessary. Services were provided through a video synchronous discussion virtually to substitute for in-person clinic visit. Patient and provider were located at their individual homes. --NACHO Osuna CNP on 4/13/2020 at 4:32 PM    An electronic signature was used to authenticate this note.

## 2020-04-18 ENCOUNTER — HOSPITAL ENCOUNTER (EMERGENCY)
Age: 35
Discharge: HOME | End: 2020-04-18
Payer: COMMERCIAL

## 2020-04-18 ENCOUNTER — APPOINTMENT (OUTPATIENT)
Dept: GENERAL RADIOLOGY | Age: 35
End: 2020-04-18
Payer: COMMERCIAL

## 2020-04-18 VITALS
HEART RATE: 90 BPM | HEIGHT: 67 IN | BODY MASS INDEX: 29.03 KG/M2 | DIASTOLIC BLOOD PRESSURE: 66 MMHG | WEIGHT: 185 LBS | OXYGEN SATURATION: 97 % | TEMPERATURE: 97.9 F | SYSTOLIC BLOOD PRESSURE: 123 MMHG | RESPIRATION RATE: 16 BRPM

## 2020-04-18 PROCEDURE — 99213 OFFICE O/P EST LOW 20 MIN: CPT

## 2020-04-18 PROCEDURE — 99213 OFFICE O/P EST LOW 20 MIN: CPT | Performed by: NURSE PRACTITIONER

## 2020-04-18 PROCEDURE — 73660 X-RAY EXAM OF TOE(S): CPT

## 2020-04-18 RX ORDER — CEPHALEXIN 250 MG/1
500 CAPSULE ORAL 2 TIMES DAILY
Qty: 40 CAPSULE | Refills: 0 | Status: SHIPPED | OUTPATIENT
Start: 2020-04-18 | End: 2020-04-28

## 2020-04-18 RX ORDER — PREDNISONE 10 MG/1
10 TABLET ORAL 2 TIMES DAILY
Qty: 10 TABLET | Refills: 0 | Status: SHIPPED | OUTPATIENT
Start: 2020-04-18 | End: 2020-04-23

## 2020-04-18 ASSESSMENT — ENCOUNTER SYMPTOMS
SHORTNESS OF BREATH: 0
COUGH: 0
BLOOD IN STOOL: 0
ABDOMINAL PAIN: 0
BACK PAIN: 0
CHOKING: 0
ANAL BLEEDING: 0
DIARRHEA: 0
COLOR CHANGE: 1
WHEEZING: 0
VOMITING: 0
ABDOMINAL DISTENTION: 0
CHEST TIGHTNESS: 0
CONSTIPATION: 0
STRIDOR: 0
NAUSEA: 0
APNEA: 0
RECTAL PAIN: 0

## 2020-04-18 ASSESSMENT — PAIN SCALES - GENERAL: PAINLEVEL_OUTOF10: 8

## 2020-04-18 ASSESSMENT — PAIN DESCRIPTION - ORIENTATION: ORIENTATION: RIGHT

## 2020-04-18 ASSESSMENT — PAIN DESCRIPTION - FREQUENCY: FREQUENCY: CONTINUOUS

## 2020-04-18 ASSESSMENT — PAIN DESCRIPTION - PAIN TYPE: TYPE: ACUTE PAIN

## 2020-04-18 ASSESSMENT — PAIN DESCRIPTION - LOCATION: LOCATION: TOE (COMMENT WHICH ONE)

## 2020-04-18 ASSESSMENT — PAIN DESCRIPTION - DESCRIPTORS: DESCRIPTORS: THROBBING;SHOOTING

## 2020-04-18 NOTE — ED PROVIDER NOTES
[place] [address]  Urgent Care Encounter      CHIEF COMPLAINT       Chief Complaint   Patient presents with    Toe Pain     right great toe x two weeks getting worse       Nurses Notes reviewed and I agree except as noted in the HPI. HISTORY OFPRESENT ILLNESS   Layla Schneider is a 36 y.o. The history is provided by the patient. No  was used. Foot Problem   Location:  Toe  Time since incident:  2 weeks  Injury: no    Toe location:  R great toe  Pain details:     Quality:  Throbbing and pressure    Radiates to: right foot. Severity:  Severe    Onset quality:  Gradual    Duration:  2 weeks    Timing:  Constant    Progression:  Worsening  Chronicity:  New  Dislocation: no    Foreign body present:  No foreign bodies  Tetanus status:  Unknown  Prior injury to area:  Yes (surgery years ago)  Relieved by:  Nothing  Worsened by:  Bearing weight and extension  Ineffective treatments:  NSAIDs and acetaminophen  Associated symptoms: decreased ROM, muscle weakness and swelling    Associated symptoms: no back pain, no fatigue, no fever, no itching, no neck pain, no numbness, no stiffness and no tingling    Risk factors: no concern for non-accidental trauma, no frequent fractures, no known bone disorder, no obesity and no recent illness        REVIEW OF SYSTEMS     Review of Systems   Constitutional: Negative for activity change, appetite change, chills, diaphoresis, fatigue and fever. Respiratory: Negative for apnea, cough, choking, chest tightness, shortness of breath, wheezing and stridor. Cardiovascular: Negative for chest pain, palpitations and leg swelling. Gastrointestinal: Negative for abdominal distention, abdominal pain, anal bleeding, blood in stool, constipation, diarrhea, nausea, rectal pain and vomiting. Musculoskeletal: Positive for gait problem, joint swelling and myalgias. Negative for arthralgias, back pain, neck pain, neck stiffness and stiffness.    Skin: changes such as fever not relieved with Motrin and Tylenol chest pain or shortness of breath patient is to dial 911 or go directly to the emergency room for reevaluation and further management of care. The patient does not experience any of these symptoms or concerns. Follow-up with her primary care provider in 2-3 days for reevaluation. The patient/Patient representatives are agreeable to treatment plan at this time and the patient left in stable condition.           PATIENT REFERRED TO:  NACHO Osuna CNP  [address]  [phone]    Schedule an appointment as soon as possible for a visit in 1 week      DISCHARGE MEDICATIONS:  Discharge Medication List as of 4/18/2020  4:19 PM      START taking these medications    Details   cephALEXin (KEFLEX) 250 MG capsule Take 2 capsules by mouth 2 times daily for 10 days, Disp-40 capsule, R-0Normal      predniSONE (DELTASONE) 10 MG tablet Take 1 tablet by mouth 2 times daily for 5 days, Disp-10 tablet, R-0Normal           Discharge Medication List as of 4/18/2020  4:19 PM          NACHO Wise CNP, APRN - CNP  04/18/20 1626

## 2020-04-20 ENCOUNTER — TELEPHONE (OUTPATIENT)
Dept: FAMILY MEDICINE CLINIC | Age: 35
End: 2020-04-20

## 2020-11-04 ENCOUNTER — HOSPITAL ENCOUNTER (EMERGENCY)
Age: 35
Discharge: HOME | End: 2020-11-04
Payer: COMMERCIAL

## 2020-11-04 ENCOUNTER — NURSE TRIAGE (OUTPATIENT)
Dept: OTHER | Facility: CLINIC | Age: 35
End: 2020-11-04

## 2020-11-04 VITALS
HEART RATE: 78 BPM | RESPIRATION RATE: 16 BRPM | DIASTOLIC BLOOD PRESSURE: 78 MMHG | SYSTOLIC BLOOD PRESSURE: 122 MMHG | TEMPERATURE: 99.1 F | OXYGEN SATURATION: 98 %

## 2020-11-04 LAB
FLU A ANTIGEN: NEGATIVE
FLU B ANTIGEN: NEGATIVE

## 2020-11-04 PROCEDURE — 87804 INFLUENZA ASSAY W/OPTIC: CPT

## 2020-11-04 PROCEDURE — 99213 OFFICE O/P EST LOW 20 MIN: CPT

## 2020-11-04 PROCEDURE — U0003 INFECTIOUS AGENT DETECTION BY NUCLEIC ACID (DNA OR RNA); SEVERE ACUTE RESPIRATORY SYNDROME CORONAVIRUS 2 (SARS-COV-2) (CORONAVIRUS DISEASE [COVID-19]), AMPLIFIED PROBE TECHNIQUE, MAKING USE OF HIGH THROUGHPUT TECHNOLOGIES AS DESCRIBED BY CMS-2020-01-R: HCPCS

## 2020-11-04 PROCEDURE — 99214 OFFICE O/P EST MOD 30 MIN: CPT | Performed by: NURSE PRACTITIONER

## 2020-11-04 RX ORDER — GABAPENTIN 300 MG/1
300 CAPSULE ORAL DAILY
COMMUNITY
Start: 2020-10-19 | End: 2022-03-17 | Stop reason: ALTCHOICE

## 2020-11-04 RX ORDER — FLUTICASONE PROPIONATE 50 MCG
2 SPRAY, SUSPENSION (ML) NASAL DAILY
Qty: 1 BOTTLE | Refills: 0 | Status: SHIPPED | OUTPATIENT
Start: 2020-11-04 | End: 2021-08-02

## 2020-11-04 RX ORDER — GUAIFENESIN AND PSEUDOEPHEDRINE HCL 1200; 120 MG/1; MG/1
1 TABLET, EXTENDED RELEASE ORAL 2 TIMES DAILY PRN
Qty: 20 TABLET | Refills: 0 | Status: SHIPPED | OUTPATIENT
Start: 2020-11-04 | End: 2022-03-17

## 2020-11-04 ASSESSMENT — ENCOUNTER SYMPTOMS
RHINORRHEA: 1
SORE THROAT: 1
COUGH: 1

## 2020-11-04 NOTE — ED PROVIDER NOTES
[facility]  Urgent Care Encounter       CHIEF COMPLAINT     No chief complaint on file. Nurses Notes reviewed and I agree except as noted in the HPI. HISTORY OF PRESENT ILLNESS   Slava Aldana is a 30 y.o. female who presents with complaints of nasal congestion, cough, fatigue, and sore throat since Sunday. The history is provided by the patient. URI   Presenting symptoms: congestion, cough, fatigue, rhinorrhea and sore throat    Presenting symptoms: no fever    Congestion:     Location:  Nasal  Cough:     Cough characteristics:  Non-productive    Sputum characteristics:  Nondescript    Severity:  Moderate    Onset quality:  Sudden    Duration:  3 days    Timing:  Constant    Progression:  Worsening    Chronicity:  New  Rhinorrhea:     Quality:  Clear    Severity:  Moderate    Duration:  3 days    Timing:  Constant    Progression:  Worsening  Severity:  Moderate  Onset quality:  Sudden  Duration:  3 days  Timing:  Constant  Progression:  Worsening  Chronicity:  New  Relieved by:  Nothing  Worsened by:  Nothing  Ineffective treatments:  OTC medications (DayQuil and NightQuil)  Associated symptoms: no headaches, no myalgias and no sneezing    Risk factors: no sick contacts        REVIEW OF SYSTEMS     Review of Systems   Constitutional: Positive for fatigue. Negative for fever. HENT: Positive for congestion, rhinorrhea and sore throat. Negative for sneezing. Respiratory: Positive for cough. Musculoskeletal: Negative for myalgias. Neurological: Negative for headaches. PAST MEDICAL HISTORY         Diagnosis Date    Abnormal Pap smear and cervical HPV (human papillomavirus)     Anxiety     Asthma     Depression     PTSD (post-traumatic stress disorder)     Smoker     Substance abuse ([facility] [address]     history of    Tonsillitis     recurrent       SURGICALHISTORY     Patient  has a past surgical history that includes Dental surgery; Tubal ligation (1/2012);  Foot surgery (Right, 08/26/14); and HYSTERECTOMY ABDOMINAL (N/A, 8/5/2019). CURRENT MEDICATIONS       Previous Medications    ACETAMINOPHEN (TYLENOL) 325 MG TABLET    Take 650 mg by mouth every 6 hours as needed for Pain    GABAPENTIN (NEURONTIN) 300 MG CAPSULE    take 1 capsule by mouth at bedtime for 3 days then twice a day for 3 days then three times a day       ALLERGIES     Patient is is allergic to augmentin [amoxicillin-pot clavulanate]; bee venom; nicotine; and bee pollen. Patients   Immunization History   Administered Date(s) Administered    Tdap (Boostrix, Adacel) 04/28/2016       FAMILY HISTORY     Patient's family history includes Alcohol Abuse in an other family member; Cancer in her mother. SOCIAL HISTORY     Patient  reports that she has been smoking cigarettes. She has a 8.00 pack-year smoking history. She has never used smokeless tobacco. She reports current alcohol use of about 1.0 standard drinks of alcohol per week. She reports that she does not use drugs. PHYSICAL EXAM     ED TRIAGE VITALS   ,  ,  ,  ,  ,Estimated body mass index is 28.98 kg/m² as calculated from the following:    Height as of 4/18/20: 5' 7\" (1.702 m). Weight as of 4/18/20: 185 lb (83.9 kg). ,Patient's last menstrual period was 08/18/2019. Physical Exam  Constitutional:       Appearance: Normal appearance. She is ill-appearing. HENT:      Right Ear: Tympanic membrane, ear canal and external ear normal.      Left Ear: Tympanic membrane, ear canal and external ear normal.      Nose: Congestion present. Mouth/Throat:      Mouth: Mucous membranes are moist.      Pharynx: Posterior oropharyngeal erythema present. No oropharyngeal exudate. Neck:      Musculoskeletal: No muscular tenderness. Cardiovascular:      Rate and Rhythm: Normal rate and regular rhythm. Pulses: Normal pulses. Heart sounds: Normal heart sounds.    Pulmonary:      Effort: Pulmonary effort is normal.      Breath sounds: Normal breath sounds. Lymphadenopathy:      Cervical: No cervical adenopathy. Skin:     General: Skin is warm and dry. Neurological:      Mental Status: She is alert and oriented to person, place, and time. Psychiatric:         Behavior: Behavior normal.       DIAGNOSTIC RESULTS     Labs:  Results for orders placed or performed during the hospital encounter of 11/04/20   Rapid influenza A/B antigens   Result Value Ref Range    Flu A Antigen Negative NEGATIVE    Flu B Antigen Negative NEGATIVE       IMAGING:  None    EKG:  None    URGENT CARE COURSE:     There were no vitals filed for this visit. Medications - No data to display         PROCEDURES:  None    FINAL IMPRESSION      1. Acute upper respiratory infection    2. Viral illness    3. Encounter for laboratory testing for COVID-19 virus      DISPOSITION/ PLAN   DISPOSITION Decision To Discharge 11/04/2020 10:32:31 AM     Ankle exam consistent with viral acute upper respiratory infection. Manage symptoms with Flonase and Mucinex D for congestion. Patient encouraged to encourage oral fluid intake. She may take over-the-counter acetaminophen and ibuprofen for fever or symptom management. COVID-19 swab obtained. Patient instructed to self quarantine for 3 to 4 days or until negative result. Patient voiced understanding and was agreeable with above-mentioned plan. Patient was discharged in stable condition.     PATIENT REFERRED TO:  NACHO Coleman CNP  [address] [address]      DISCHARGE MEDICATIONS:  New Prescriptions    FLUTICASONE (FLONASE) 50 MCG/ACT NASAL SPRAY    2 sprays by Each Nostril route daily    PSEUDOEPHEDRINE-GUAIFENESIN (MUCINEX D MAX STRENGTH) 120-1200 MG TB12    Take 1 tablet by mouth 2 times daily as needed (cough/congestion)       Discontinued Medications    No medications on file       Current Discharge Medication List          NACHO Asher CNP    (Please note that portions of this note were completed with a voice recognition program. Efforts were made to edit the dictations but occasionally words are mis-transcribed.)           NACHO Stubbs - CNP  11/04/20 5625

## 2020-11-04 NOTE — TELEPHONE ENCOUNTER
Patient called pre-service center [facility]) GALLO HAWALOREN BURDICK BALDEVPAWAN TAHIR with red flag complaint. Brief description of triage: Pt calling with c/o chills, fever, cough, sore throat and runny nose. She is concerned that she may have Covid. See detailed triage for more detail    Triage indicates call PCP when office is open. Pt would like to go to urgent care or testing site for Covid test so she can go back to work. Care advice provided, patient verbalizes understanding; denies any other questions or concerns; instructed to call back for any new or worsening symptoms. Attention Provider: Thank you for allowing me to participate in the care of your patient. The patient was connected to triage in response to information provided to the [facility]. Please do not respond through this encounter as the response is not directed to a shared pool. Reason for Disposition   [1] COVID-19 infection suspected by caller or triager AND [2] mild symptoms (cough, fever, or others) AND [9] no complications or SOB    Answer Assessment - Initial Assessment Questions  1. COVID-19 DIAGNOSIS: \"Who made your Coronavirus (COVID-19) diagnosis? \" \"Was it confirmed by a positive lab test?\" If not diagnosed by a HCP, ask \"Are there lots of cases (community spread) where you live? \" (See public health department website, if unsure)      Yes, and works in a school    2. ONSET: \"When did the COVID-19 symptoms start? \"       2-3 days ago    3. WORST SYMPTOM: \"What is your worst symptom? \" (e.g., cough, fever, shortness of breath, muscle aches)      Runny nose and cough    4. COUGH: \"Do you have a cough? \" If so, ask: \"How bad is the cough? \"        Productive and keeps up at night    5. FEVER: \"Do you have a fever? \" If so, ask: \"What is your temperature, how was it measured, and when did it start? \"      States that she does not have a fever but she does have chills and sweats. She has also been taking OTC medications that can mask a fever.     6. RESPIRATORY STATUS: \"Describe your breathing? \" (e.g., shortness of breath, wheezing, unable to speak)       Wheezing but she states that she is a smoker and has some coughing usually    7. BETTER-SAME-WORSE: Maicotom Stiles you getting better, staying the same or getting worse compared to yesterday? \"  If getting worse, ask, \"In what way? \"      Feeling worse today than she did yesterday    8. HIGH RISK DISEASE: \"Do you have any chronic medical problems? \" (e.g., asthma, heart or lung disease, weak immune system, etc.)      No    9. PREGNANCY: \"Is there any chance you are pregnant? \" \"When was your last menstrual period? \"      No    10. OTHER SYMPTOMS: \"Do you have any other symptoms? \"  (e.g., chills, fatigue, headache, loss of smell or taste, muscle pain, sore throat)        Yes, see travel screen    Protocols used: CORONAVIRUS (COVID-19) DIAGNOSED OR SUSPECTED-ADULT-[place], CORONAVIRUS (COVID-19) DIAGNOSED OR SUSPECTED-ADULT-[place]

## 2020-11-04 NOTE — ED TRIAGE NOTES
Jolly Davis arrives to room with complaint of cough congestion fatigue body aches symptoms started 4 days ago.

## 2020-11-05 ENCOUNTER — CARE COORDINATION (OUTPATIENT)
Dept: CARE COORDINATION | Age: 35
End: 2020-11-05

## 2020-11-05 NOTE — CARE COORDINATION
.Attempted to reach patient for a follow up in regards to COVID-19 education/ monitoring. Patient was unavailable at the time of my call, and a generic voicemail message was left asking patient to return my call at [phone].     Stella [facility]  [address] [facility]   Medication Assistance  GALLO DAMICO II.[employer]    (S)[phone]  (W)[phone]

## 2020-11-05 NOTE — CARE COORDINATION
Patient contacted regarding recent visit for viral symptoms. This Jennifercecelia Lloydcarson contacted the patient by telephone to perform post discharge call. Verified name and  with patient as identifiers. Provided introduction to self, and reason for call due to viral symptoms of infection and/or exposure to COVID-19. Call within 2 business days of discharge: Yes       Patient presented to emergency department/flu clinic with complaints of viral symptoms/exposure to COVID. Patient reports symptoms are the same. Due to no new or worsening symptoms the RN CTN/ACSABINA was not notified for escalation. Discussed exposure protocols and quarantine with CDC Guidelines What To Do If You Are Sick    Patient was given an opportunity for questions and concerns. Stay home except to get medical care    Separate yourself from other people and animals in your home    Call ahead before visiting your doctor    Wear a facemask    Cover your coughs and sneezes    Clean your hands often    Avoid sharing personal household items    Clean all high-touch surfaces everyday    Monitor your symptoms  Seek prompt medical attention if your illness is worsening (e.g., difficulty breathing). Before seeking care, call your healthcare provider and tell them that you have, or are being evaluated for, COVID-19. Put on a facemask before you enter the facility. These steps will help the healthcare provider's office to keep other people in the office or waiting room from getting infected or exposed. Ask your healthcare provider to call the local or [facility] health department. Persons who are placed under If you have a medical emergency and need to call 911, notify the dispatch personnel that you have, or are being evaluated for COVID-19. If possible, put on a facemask before emergency medical services arrive.     The patient agrees to contact the [facility] exposure line [phone], local health department [place] [facility]: ([phone]) and PCP office for questions related to their healthcare. Author provided contact information for future reference. Patient/family/caregiver given information for [employer] and agrees to enroll yes  Patient's preferred e-mail:     [email or web address]. [email or web address]           Patient's preferred phone number: [phone]  Based on Loop alert triggers, patient will be contacted by nurse care manager for worsening symptoms. Patient was seen in the  on 11/4/20 for covid like symptoms. Patient is self quarantined until she gets her test results back. Patient educated on covid precautions and given covid hotline number. Patient agreed to sign up for get well loop.       Sidra Damico [facility]  [address] [facility]   Medication Assistance  GALLO DAMICO II.[employer]    K)[phone] (a)[phone]

## 2020-11-07 LAB — SARS-COV-2: NOT DETECTED

## 2020-11-10 ENCOUNTER — VIRTUAL VISIT (OUTPATIENT)
Dept: FAMILY MEDICINE CLINIC | Age: 35
End: 2020-11-10
Payer: COMMERCIAL

## 2020-11-10 PROCEDURE — G8427 DOCREV CUR MEDS BY ELIG CLIN: HCPCS | Performed by: NURSE PRACTITIONER

## 2020-11-10 PROCEDURE — 99213 OFFICE O/P EST LOW 20 MIN: CPT | Performed by: NURSE PRACTITIONER

## 2020-11-10 RX ORDER — ALBUTEROL SULFATE 90 UG/1
2 AEROSOL, METERED RESPIRATORY (INHALATION) EVERY 6 HOURS PRN
Qty: 1 INHALER | Refills: 3 | Status: SHIPPED | OUTPATIENT
Start: 2020-11-10

## 2020-11-10 RX ORDER — PREDNISONE 20 MG/1
20 TABLET ORAL 2 TIMES DAILY
Qty: 10 TABLET | Refills: 0 | Status: SHIPPED | OUTPATIENT
Start: 2020-11-10 | End: 2020-11-15

## 2020-11-10 RX ORDER — PROMETHAZINE HYDROCHLORIDE AND CODEINE PHOSPHATE 6.25; 1 MG/5ML; MG/5ML
5 SYRUP ORAL 4 TIMES DAILY PRN
Qty: 140 ML | Refills: 0 | Status: SHIPPED | OUTPATIENT
Start: 2020-11-10 | End: 2020-11-17

## 2020-11-10 RX ORDER — AZITHROMYCIN 250 MG/1
250 TABLET, FILM COATED ORAL SEE ADMIN INSTRUCTIONS
Qty: 6 TABLET | Refills: 0 | Status: SHIPPED | OUTPATIENT
Start: 2020-11-10 | End: 2020-11-15

## 2020-11-10 ASSESSMENT — ENCOUNTER SYMPTOMS
SINUS PAIN: 0
RHINORRHEA: 1
TROUBLE SWALLOWING: 0
WHEEZING: 1
SHORTNESS OF BREATH: 1
SORE THROAT: 0
SINUS PRESSURE: 0
COUGH: 1

## 2020-11-10 NOTE — PROGRESS NOTES
11/10/2020    TELEHEALTH EVALUATION -- Audio/Visual (During PNUPQ-38 public health emergency)    HPI:visit conducted with pt at home and provider Belkis Gabriel CNP in office. Ofelia Patel (:  1985) has requested an audio/video evaluation for the following concern(s):    URI Symptoms    HPI:      Symptoms have been present for 6 day(s). Symptoms are unchanged since they initially started. Fever? Yes - in the beginning but gone now  Runny nose or congestion? Yes   Cough? Yes  Sore throat? No  Headache, fatigue, joint pains, muscle aches? No  Shortness of breath/Wheezing? Yes - she has been wheezing off and on  Nausea/Vomiting/Diarrhea? No  Double Sickening? No  Sick contacts? No    Patient has tried mucinex, tylenol and motrin and flonase with out improvement. She had a negative covid test    Review of Systems   Constitutional: Positive for fatigue. Negative for chills and fever. HENT: Positive for congestion, postnasal drip and rhinorrhea. Negative for sinus pressure, sinus pain, sneezing, sore throat, tinnitus and trouble swallowing. Respiratory: Positive for cough, shortness of breath and wheezing. Cardiovascular: Negative. Musculoskeletal: Positive for arthralgias and myalgias. Skin: Negative. Neurological: Positive for headaches (from coughing). Negative for dizziness. Psychiatric/Behavioral: Positive for sleep disturbance (can't sleep due to cough). Negative for suicidal ideas. Prior to Visit Medications    Medication Sig Taking?  Authorizing Provider   predniSONE (DELTASONE) 20 MG tablet Take 1 tablet by mouth 2 times daily for 5 days Yes NACHO Dodson CNP   azithromycin (ZITHROMAX) 250 MG tablet Take 1 tablet by mouth See Admin Instructions for 5 days 500mg on day 1 followed by 250mg on days 2 - 5 Yes NACHO Dodson CNP   promethazine-codeine (PHENERGAN WITH CODEINE) 6.25-10 MG/5ML syrup Take 5 mLs by mouth 4 times daily as needed for Cough for up to 7 days. Yes NACHO Ibrahim CNP   albuterol sulfate HFA (PROVENTIL HFA) 108 (90 Base) MCG/ACT inhaler Inhale 2 puffs into the lungs every 6 hours as needed for Wheezing Yes NACHO Ibrahim CNP   gabapentin (NEURONTIN) 300 MG capsule take 1 capsule by mouth at bedtime for 3 days then twice a day for 3 days then three times a day  Historical Provider, MD   Pseudoephedrine-guaiFENesin (MUCINEX D MAX STRENGTH) 120-1200 MG TB12 Take 1 tablet by mouth 2 times daily as needed (cough/congestion)  NACHO Umana CNP   fluticasone (FLONASE) 50 MCG/ACT nasal spray 2 sprays by Each Nostril route daily  NACHO Umana CNP   acetaminophen (TYLENOL) 325 MG tablet Take 650 mg by mouth every 6 hours as needed for Pain  Historical Provider, MD       Social History     Tobacco Use    Smoking status: Current Every Day Smoker     Packs/day: 0.50     Years: 16.00     Pack years: 8.00     Types: Cigarettes     Last attempt to quit: 12/15/2015     Years since quittin.9    Smokeless tobacco: Never Used   Substance Use Topics    Alcohol use:  Yes     Alcohol/week: 1.0 standard drinks     Types: 1 Glasses of wine per week     Comment: occasionally    Drug use: No     Frequency: 7.0 times per week     Types: Marijuana     Comment: past history of polysubstance abuse: cannabis, crack, cociane        Allergies   Allergen Reactions    Augmentin [Amoxicillin-Pot Clavulanate] Swelling    Bee Venom Hives    Nicotine Swelling     patch    Bee Pollen Swelling and Rash   ,   Past Medical History:   Diagnosis Date    Abnormal Pap smear and cervical HPV (human papillomavirus)     Anxiety     Asthma     Depression     PTSD (post-traumatic stress disorder)     Smoker     Substance abuse (HCC)     history of    Tonsillitis     recurrent   ,   Past Surgical History:   Procedure Laterality Date    DENTAL SURGERY      FOOT SURGERY Right 14    HYSTERECTOMY ABDOMINAL N/A 2019    ROBOTIC HYSTER W/ BS performed by Estefani Morales MD at [address] [place] [address]  2012   ,   Social History     Tobacco Use    Smoking status: Current Every Day Smoker     Packs/day: 0.50     Years: 16.00     Pack years: 8.00     Types: Cigarettes     Last attempt to quit: 12/15/2015     Years since quittin.9    Smokeless tobacco: Never Used   Substance Use Topics    Alcohol use:  Yes     Alcohol/week: 1.0 standard drinks     Types: 1 Glasses of wine per week     Comment: occasionally    Drug use: No     Frequency: 7.0 times per week     Types: Marijuana     Comment: past history of polysubstance abuse: cannabis, crack, cociane   ,   Family History   Problem Relation Age of Onset    Cancer Mother         ovarian in mother  , paternal grandmother- breast cancer    Alcohol Abuse Other          family   ,   Immunization History   Administered Date(s) Administered    Tdap (Boostrix, Adacel) 2016   ,   Health Maintenance   Topic Date Due    Pneumococcal 0-64 years Vaccine (1 of 1 - PPSV23) 1992    Flu vaccine (1) 2020    DTaP/Tdap/Td vaccine (2 - Td) 2026    HIV screen  Completed    Hepatitis A vaccine  Aged Out    Hepatitis B vaccine  Aged Out    Hib vaccine  Aged Out    Meningococcal (ACWY) vaccine  Aged Out    Varicella vaccine  Discontinued       PHYSICAL EXAMINATION:  [ INSTRUCTIONS:  \"[x]\" Indicates a positive item  \"[]\" Indicates a negative item  -- DELETE ALL ITEMS NOT EXAMINED]  Vital Signs: (As obtained by patient/caregiver or practitioner observation)    Blood pressure-  Heart rate-    Respiratory rate-    Temperature-  Pulse oximetry-     Constitutional: [] Appears well-developed and well-nourished [] No apparent distress      [x] Abnormal- appears ill  Mental status  [x] Alert and awake  [x] Oriented to person/place/time []Able to follow commands      Eyes:  EOM    []  Normal  [] Abnormal-  Sclera  [x]  Normal  [] Abnormal -         Discharge [x]  None visible [] Abnormal -    HENT:   [] Normocephalic, atraumatic. [] Abnormal   [x] Mouth/Throat: Mucous membranes are moist. , nasal congestion noted  External Ears [] Normal  [] Abnormal-     Neck: [x] No visualized mass     Pulmonary/Chest: [x] Respiratory effort normal.  [] No visualized signs of difficulty breathing or respiratory distress        [x] Abnormal- pt with harsh barky cough during visit, some wheezing noted   Musculoskeletal:   [] Normal gait with no signs of ataxia         [] Normal range of motion of neck        [] Abnormal-       Neurological:        [] No Facial Asymmetry (Cranial nerve 7 motor function) (limited exam to video visit)          [] No gaze palsy        [] Abnormal-         Skin:        [x] No significant exanthematous lesions or discoloration noted on facial skin         [] Abnormal-            Psychiatric:       [] Normal Affect [] No Hallucinations        [] Abnormal-     Other pertinent observable physical exam findings-     ASSESSMENT/PLAN:  1. Bronchitis  Offered pt chest x-ray to rule out pneumonia but she declined. Increase water   tylenol and motrin for fever or pain  - azithromycin (ZITHROMAX) 250 MG tablet; Take 1 tablet by mouth See Admin Instructions for 5 days 500mg on day 1 followed by 250mg on days 2 - 5  Dispense: 6 tablet; Refill: 0  - promethazine-codeine (PHENERGAN WITH CODEINE) 6.25-10 MG/5ML syrup; Take 5 mLs by mouth 4 times daily as needed for Cough for up to 7 days. Dispense: 140 mL; Refill: 0    2. Wheezing  Prednisone ordered  - promethazine-codeine (PHENERGAN WITH CODEINE) 6.25-10 MG/5ML syrup; Take 5 mLs by mouth 4 times daily as needed for Cough for up to 7 days. Dispense: 140 mL; Refill: 0    3. Nasal congestion    - promethazine-codeine (PHENERGAN WITH CODEINE) 6.25-10 MG/5ML syrup; Take 5 mLs by mouth 4 times daily as needed for Cough for up to 7 days. Dispense: 140 mL; Refill: 0  Call if condition worsens.    Return if symptoms worsen or fail to florentin Jackson is a 30 y.o. female being evaluated by a Virtual Visit (video visit) encounter to address concerns as mentioned above. A caregiver was present when appropriate. Due to this being a TeleHealth encounter (During [facility] public [facility]), evaluation of the following organ systems was limited: Vitals/Constitutional/EENT/Resp/CV/GI//MS/Neuro/Skin/Heme-Lymph-Imm. Pursuant to the emergency declaration under the [address] [place] [address], [address] [place] [address] and the [employer] Act, this Virtual Visit was conducted with patient's (and/or legal guardian's) consent, to reduce the patient's risk of exposure to COVID-19 and provide necessary medical care. The patient (and/or legal guardian) has also been advised to contact this office for worsening conditions or problems, and seek emergency medical treatment and/or call 911 if deemed necessary. Patient identification was verified at the start of the visit: Yes    Total time spent on this encounter: Not billed by time    Services were provided through a video synchronous discussion virtually to substitute for in-person clinic visit. Patient and provider were located at their individual homes. --NACHO Jacobs CNP on 11/10/2020 at 3:27 PM    An electronic signature was used to authenticate this note.

## 2021-01-07 ENCOUNTER — TELEPHONE (OUTPATIENT)
Dept: FAMILY MEDICINE CLINIC | Age: 36
End: 2021-01-07

## 2021-01-07 ENCOUNTER — VIRTUAL VISIT (OUTPATIENT)
Dept: FAMILY MEDICINE CLINIC | Age: 36
End: 2021-01-07
Payer: COMMERCIAL

## 2021-01-07 DIAGNOSIS — F41.9 ANXIETY: ICD-10-CM

## 2021-01-07 DIAGNOSIS — F51.04 PSYCHOPHYSIOLOGICAL INSOMNIA: ICD-10-CM

## 2021-01-07 DIAGNOSIS — F33.1 MODERATE EPISODE OF RECURRENT MAJOR DEPRESSIVE DISORDER (HCC): Primary | ICD-10-CM

## 2021-01-07 PROCEDURE — 99214 OFFICE O/P EST MOD 30 MIN: CPT | Performed by: NURSE PRACTITIONER

## 2021-01-07 PROCEDURE — G8427 DOCREV CUR MEDS BY ELIG CLIN: HCPCS | Performed by: NURSE PRACTITIONER

## 2021-01-07 RX ORDER — PAROXETINE HYDROCHLORIDE 20 MG/1
20 TABLET, FILM COATED ORAL DAILY
Qty: 30 TABLET | Refills: 3 | Status: SHIPPED | OUTPATIENT
Start: 2021-01-07 | End: 2021-02-08 | Stop reason: SDUPTHER

## 2021-01-07 ASSESSMENT — ENCOUNTER SYMPTOMS: RESPIRATORY NEGATIVE: 1

## 2021-01-07 NOTE — TELEPHONE ENCOUNTER
ECC received a call from:    Name of Caller: Denisse    Relationship to patient: Self    Best contact number: [phone]    Reason for call:  Pt called in stating she just had an injection in her foot. She is leaving work early, will not be able to come in person for the office visit. Pt inquiring if this can be switched to VV    Appt 1/7 at 4:20.

## 2021-02-08 ENCOUNTER — OFFICE VISIT (OUTPATIENT)
Dept: FAMILY MEDICINE CLINIC | Age: 36
End: 2021-02-08
Payer: COMMERCIAL

## 2021-02-08 VITALS
WEIGHT: 168 LBS | RESPIRATION RATE: 10 BRPM | DIASTOLIC BLOOD PRESSURE: 78 MMHG | SYSTOLIC BLOOD PRESSURE: 118 MMHG | HEART RATE: 78 BPM | TEMPERATURE: 97.6 F | HEIGHT: 67 IN | BODY MASS INDEX: 26.37 KG/M2 | OXYGEN SATURATION: 99 %

## 2021-02-08 DIAGNOSIS — F32.4 MAJOR DEPRESSIVE DISORDER IN PARTIAL REMISSION, UNSPECIFIED WHETHER RECURRENT (HCC): Primary | ICD-10-CM

## 2021-02-08 DIAGNOSIS — R45.1 AGITATION: ICD-10-CM

## 2021-02-08 DIAGNOSIS — F41.0 PANIC ATTACK: ICD-10-CM

## 2021-02-08 PROCEDURE — G8427 DOCREV CUR MEDS BY ELIG CLIN: HCPCS | Performed by: NURSE PRACTITIONER

## 2021-02-08 PROCEDURE — G8484 FLU IMMUNIZE NO ADMIN: HCPCS | Performed by: NURSE PRACTITIONER

## 2021-02-08 PROCEDURE — 4004F PT TOBACCO SCREEN RCVD TLK: CPT | Performed by: NURSE PRACTITIONER

## 2021-02-08 PROCEDURE — 99213 OFFICE O/P EST LOW 20 MIN: CPT | Performed by: NURSE PRACTITIONER

## 2021-02-08 PROCEDURE — G8417 CALC BMI ABV UP PARAM F/U: HCPCS | Performed by: NURSE PRACTITIONER

## 2021-02-08 RX ORDER — PAROXETINE 30 MG/1
30 TABLET, FILM COATED ORAL DAILY
Qty: 90 TABLET | Refills: 1 | Status: SHIPPED | OUTPATIENT
Start: 2021-02-08 | End: 2021-07-29

## 2021-02-08 NOTE — LETTER
[address] [facility]  [address] [place] [address]. [place] [address]  Phone: [phone]  Fax: [phone]    NACHO Ward CNP        February 8, 2021     Patient: Efrem Rojas   YOB: 1985   Date of Visit: 2/8/2021       To Whom It May Concern: It is my medical opinion that Christopher Tran please excuse patient from work s she had an appointment today. .    If you have any questions or concerns, please don't hesitate to call.     Sincerely,        NACHO Ward CNP

## 2021-02-08 NOTE — PROGRESS NOTES
Brian High is a 30 y.o. female that presents for Follow-up (1 month f/u for Severe Depression and anxiety) and Discuss Medications (patient states that she feels that the paxil dose needs increased)      Anxiety     HPI:    Inciting events or triggers for anxiety - everyday life events, going out in public   Frequency of anxiety - daily  Panic attacks? Yes - when she is out in public, thinks people are looking at her,   Symptoms of panic attacks -  chest pain, difficulty concentrating, feelings of losing control, panic attacks and racing thoughts  Sleep Disturbances? Yes - paxil makes her sleepy but yet hs trouble sleeping some nights   Impaired concentration? Some days she thinks she has trouble concentrating  Substance abuse? No  Suicidal/Homicidal Ideation? No    Compliant with meds: yes  Med side effects: No    Sees therapist?: is in marriage counseling   Family History of Mental Illness? Yes    Does think the paxil is helping, but could be a higher dose. States she felt agitated over the weekend, not sure why , feels impatient, does have high energy levels some days for long periods of time and then crashes but states she works 12 hour shifts and catches up on the weekend. denies thoughts of hurting self or others. Physical Exam    /78   Pulse 78   Temp 97.6 °F (36.4 °C) (Oral)   Resp 10   Ht 5' 6.54\" (1.69 m)   Wt 168 lb (76.2 kg)   LMP 08/18/2019   SpO2 99%   BMI 26.68 kg/m²   Appearance: alert, well appearing, and in no distress, normal appearing weight and well hydrated. CVS exam: normal rate, regular rhythm, normal S1, S2, no murmurs, rubs, clicks or gallops. Lungs: clear to auscultation, no wheezes, rales or rhonchi, symmetric air entry. Skin exam - normal coloration and turgor, no rashes, no suspicious skin lesions noted. Psych:  Affect appropriate. Thought process is normal without evidence of depression or psychosis. Good insight and appropriae interaction. Cognition and memory appear to be intact. ASSESSMENT & PLAN  Jenifer Kenney was seen today for follow-up and discuss medications. Diagnoses and all orders for this visit:    Major depressive disorder in partial remission, unspecified whether recurrent (HCC)  -     PARoxetine (PAXIL) 30 MG tablet; Take 1 tablet by mouth daily  -     Basic Metabolic Panel; Future  -     CBC With Auto Differential; Future  -     TSH With Reflex Ft4; Future  Increase paxil to 30 mg daily   Panic attack  -     PARoxetine (PAXIL) 30 MG tablet; Take 1 tablet by mouth daily  -     CBC With Auto Differential; Future  -     TSH With Reflex Ft4; Future  As above  Agitation  -     CBC With Auto Differential; Future  Monitor, may need to consider adding a mood stabilizer. Return in about 2 months (around 4/8/2021) for f/u new med.

## 2021-02-11 ENCOUNTER — HOSPITAL ENCOUNTER (EMERGENCY)
Age: 36
Discharge: HOME | End: 2021-02-11
Attending: EMERGENCY MEDICINE
Payer: COMMERCIAL

## 2021-02-11 VITALS
DIASTOLIC BLOOD PRESSURE: 76 MMHG | RESPIRATION RATE: 18 BRPM | HEIGHT: 67 IN | TEMPERATURE: 97.4 F | OXYGEN SATURATION: 99 % | BODY MASS INDEX: 25.11 KG/M2 | SYSTOLIC BLOOD PRESSURE: 143 MMHG | HEART RATE: 76 BPM | WEIGHT: 160 LBS

## 2021-02-11 DIAGNOSIS — L03.316 CELLULITIS OF UMBILICUS: Primary | ICD-10-CM

## 2021-02-11 PROCEDURE — 99214 OFFICE O/P EST MOD 30 MIN: CPT

## 2021-02-11 PROCEDURE — 99281 EMR DPT VST MAYX REQ PHY/QHP: CPT

## 2021-02-11 RX ORDER — CLINDAMYCIN HYDROCHLORIDE 150 MG/1
450 CAPSULE ORAL 3 TIMES DAILY
Qty: 63 CAPSULE | Refills: 0 | Status: SHIPPED | OUTPATIENT
Start: 2021-02-11 | End: 2021-02-18

## 2021-02-11 ASSESSMENT — ENCOUNTER SYMPTOMS
BLOOD IN STOOL: 0
SHORTNESS OF BREATH: 0
ABDOMINAL DISTENTION: 0
VOMITING: 0
TROUBLE SWALLOWING: 0
BACK PAIN: 0
ABDOMINAL PAIN: 1
RHINORRHEA: 0
COUGH: 0
NAUSEA: 0
SORE THROAT: 0
EYE REDNESS: 0
DIARRHEA: 0
SINUS PAIN: 0
ABDOMINAL PAIN: 0
EYE DISCHARGE: 0
CONSTIPATION: 0

## 2021-02-11 NOTE — ED NOTES
To [facility] with complaints of falling down steps Tuesday. States she now has pain under and around her belly button. States it is worse when touching it.       Taz Turner RN  02/11/21 3418

## 2021-02-11 NOTE — ED PROVIDER NOTES
[address] [facility] [address] [address] EMERGENCY DEPT  UrgentCare Encounter      [facility]       Chief Complaint   Patient presents with   [place]     down a flight of stairs    Other     belly button hurts       Nurses Notes reviewed and I agree except as noted in the HPI. HISTORY OF PRESENT Ok Bosworth is a 30 y.o. female who presents with complaints of abdominal pain. Onset of symptoms less than 24 hours ago, worsening. Pain is aching, continuous. Pain worse with pressure/palpation. Rates 9/10. No changes in bowel habits. No vomiting. Symptom onset after patient fell down roughly 15 steps 2 days ago. No head injury or loss of consciousness. Patient was told by her chiropractic office that she should present for evaluation. REVIEW OF SYSTEMS     Review of Systems   Constitutional: Negative for chills, diaphoresis, fatigue and fever. HENT: Negative for congestion, ear pain, rhinorrhea, sore throat and trouble swallowing. Eyes: Negative for discharge and redness. Respiratory: Negative for cough and shortness of breath. Cardiovascular: Negative for chest pain. Gastrointestinal: Positive for abdominal pain. Negative for abdominal distention, blood in stool, constipation, diarrhea, nausea and vomiting. Genitourinary: Negative for decreased urine volume, difficulty urinating, dysuria, flank pain, frequency, genital sores, hematuria, menstrual problem, pelvic pain, urgency, vaginal bleeding, vaginal discharge and vaginal pain. Musculoskeletal: Negative for back pain, neck pain and neck stiffness. Skin: Positive for rash. Neurological: Negative for headaches. Hematological: Negative for adenopathy. Psychiatric/Behavioral: Negative for sleep disturbance.        PAST MEDICAL HISTORY         Diagnosis Date    Abnormal Pap smear and cervical HPV (human papillomavirus)     Anxiety     Asthma     Depression     PTSD (post-traumatic stress disorder)     Smoker     Substance abuse ([facility] [address] history of    Tonsillitis     recurrent       SURGICAL HISTORY     Patient  has a past surgical history that includes Dental surgery; Tubal ligation (1/2012); Foot surgery (Right, 08/26/14); and HYSTERECTOMY ABDOMINAL (N/A, 8/5/2019). CURRENT MEDICATIONS       Previous Medications    ACETAMINOPHEN (TYLENOL) 325 MG TABLET    Take 650 mg by mouth every 6 hours as needed for Pain    ALBUTEROL SULFATE HFA (PROVENTIL HFA) 108 (90 BASE) MCG/ACT INHALER    Inhale 2 puffs into the lungs every 6 hours as needed for Wheezing    FLUTICASONE (FLONASE) 50 MCG/ACT NASAL SPRAY    2 sprays by Each Nostril route daily    GABAPENTIN (NEURONTIN) 300 MG CAPSULE    Take 300 mg by mouth 3 times daily. PAROXETINE (PAXIL) 30 MG TABLET    Take 1 tablet by mouth daily    PSEUDOEPHEDRINE-GUAIFENESIN (MUCINEX D MAX STRENGTH) 120-1200 MG TB12    Take 1 tablet by mouth 2 times daily as needed (cough/congestion)       ALLERGIES     Patient is is allergic to augmentin [amoxicillin-pot clavulanate]; bee venom; nicotine; and bee pollen. FAMILY HISTORY     Patient'sfamily history includes Alcohol Abuse in an other family member; Cancer in her mother. SOCIAL HISTORY     Patient  reports that she has been smoking cigarettes. She has a 8.00 pack-year smoking history. She has never used smokeless tobacco. She reports current alcohol use of about 1.0 standard drinks of alcohol per week. She reports that she does not use drugs. PHYSICAL EXAM     ED TRIAGE VITALS  BP: (!) 119/59, Temp: 97.4 °F (36.3 °C), Pulse: 86, Resp: 16, SpO2: 98 %  Physical Exam  Vitals signs and nursing note reviewed. Constitutional:       General: She is not in acute distress. Appearance: Normal appearance. She is well-developed. She is not ill-appearing, toxic-appearing or diaphoretic. HENT:      Head: Normocephalic and atraumatic. Right Ear: Hearing, tympanic membrane, ear canal and external ear normal. No mastoid tenderness. No hemotympanum.  Tympanic membrane is not perforated, erythematous or bulging. Left Ear: Hearing, tympanic membrane, ear canal and external ear normal. No mastoid tenderness. No hemotympanum. Tympanic membrane is not perforated, erythematous or bulging. Nose: Nose normal.      Mouth/Throat:      Mouth: Mucous membranes are moist.      Pharynx: Oropharynx is clear. Uvula midline. Tonsils: No tonsillar abscesses. Eyes:      General: No scleral icterus. Conjunctiva/sclera: Conjunctivae normal.      Right eye: Right conjunctiva is not injected. No hemorrhage. Left eye: Left conjunctiva is not injected. No hemorrhage. Neck:      Musculoskeletal: Normal range of motion and neck supple. Thyroid: No thyromegaly. Trachea: Trachea normal.   Cardiovascular:      Rate and Rhythm: Normal rate and regular rhythm. No extrasystoles are present. Chest Wall: PMI is not displaced. Heart sounds: Normal heart sounds. No murmur. No friction rub. No gallop. Pulmonary:      Effort: Pulmonary effort is normal. No respiratory distress. Breath sounds: Normal breath sounds. Abdominal:      General: There is no distension. Palpations: Abdomen is soft. There is mass. Tenderness: There is no abdominal tenderness. There is no right CVA tenderness or left CVA tenderness. Musculoskeletal:      Lumbar back: Normal.   Lymphadenopathy:      Head:      Right side of head: No submental, submandibular, tonsillar or occipital adenopathy. Left side of head: No submental, submandibular, tonsillar or occipital adenopathy. Cervical: No cervical adenopathy. Upper Body:      Right upper body: No supraclavicular adenopathy. Left upper body: No supraclavicular adenopathy. Skin:     General: Skin is warm and dry. Capillary Refill: Capillary refill takes less than 2 seconds. Coloration: Skin is not jaundiced or pale. Findings: Erythema (minimal, umbilicus.  ) present. No rash. Neurological:      Mental Status: She is alert and oriented to person, place, and time. She is not disoriented. Psychiatric:         Mood and Affect: Mood normal.         Behavior: Behavior normal. Behavior is cooperative. DIAGNOSTIC RESULTS   Labs: No results found for this visit on 02/11/21. IMAGING:  No orders to display     URGENT CARE COURSE:     Vitals:    02/11/21 1431   BP: (!) 119/59   Pulse: 86   Resp: 16   Temp: 97.4 °F (36.3 °C)   TempSrc: Temporal   SpO2: 98%   Weight: 160 lb (72.6 kg)   Height: 5' 7\" (1.702 m)       Medications - No data to display  PROCEDURES:  None  FINALIMPRESSION      1. Sudden onset of severe abdominal pain    2. Periumbilical mass        DISPOSITION/PLAN   DISPOSITION Decision To Transfer 02/11/2021 02:46:06 PM  Nontoxic, no acute distress. Patient presents with severe abdominal pain. Small subcentimeter mass to left periumbilical region. Patient states pain 9/10 with palpation. She also has some erythema to the umbilicus area. No right lower quadrant tenderness. Possible CT scan/ultrasound. Report called to [employer].   PATIENT REFERRED TO:  [facility] EMERGENCY DEPT  [address] [facility]  [phone]        DISCHARGE MEDICATIONS:  New Prescriptions    No medications on file     Current Discharge Medication List          [address] [place] [address]  02/11/21 6688

## 2021-02-11 NOTE — ED PROVIDER NOTES
ATTENDING ATTESTATION  Evaluation of Daphnie Oliva. Patient seen and examined by me. Case discussed and care plan developed with resident. I agree with the note and plan as documented by him, except if my documentation differs. I reviewed the medical, surgical, family and social history, medications and allergies. I have reviewed the nursing documentation. I have reviewed the patient's vital signs. Patient c/o periumbilical redness and pain which first noticed by patient approximately 2 days ago. She also relates that she had a fall down about 1 flight of steps after being tripped by her dog. She states that she did land on her abdomen. She denies any pain or injury, no head trauma or loss of consciousness. No neck pain, back pain, chest pain. No nausea, vomiting, diarrhea. Patient states her appetite has been normal and she went to see her chiropractor yesterday to undergo an adjustment. She mentioned to them at that time the redness and pain around her bellybutton, they recommended she be evaluated by a physician. No fever, fatigue, or systemic symptoms. Physical exam is notable for well appearing, abdominal exam shows no external trauma, no abrasions or ecchymosis. Abdomen is soft, nondistended, with normal active bowel sounds. She does have some mild erythema at the umbilicus, no foul odor or discharge. There is approximately 1 cm area at the umbilicus which is slightly tender and firm. MDM: Patient did undergo fast exam and bedside ultrasound, no fluid collection appreciated. Given patient's exam, concern for umbilical cellulitis/omphalitis, no purulent discharge in area of tenderness is quite small and more localized. We will start her on antibiotics and patient was counseled to be sure to follow-up closely in 1 to 2 days for recheck. If she worsens she was also counseled to be sure to return to the emergency department immediately.   Patient voices understanding and agreement with plan and is comfortable with discharge. Plan: As above    Please see the residents' completed note for final disposition except as documented on this attestation. Diagnosis, treatment and disposition plans were discussed and agreed upon by patient. This transcription was electronically signed. It was dictated by use of voice recognition software and electronically transcribed. The transcription may contain errors not detected in proofreading.      I was present for the critical portion following procedures: None       Electronically signed by Doug Garcia MD on 2/11/21 at 4:46 PM DUSTIN Jackson MD  02/11/21 3162

## 2021-02-11 NOTE — ED PROVIDER NOTES
[address] [address]          Pt Name: Hipolito Naranjo  MRN: [id number]  [place] 1985  Date of evaluation: 2/11/2021  Treating Resident Physician: Michael Barney MD  Supervising Physician: Dr. Clarissa Phoenix       Chief Complaint   Patient presents with   [facility] Fall     down a flight of stairs    Other     belly button hurts     History obtained from the patient. HISTORY OF PRESENT ILLNESS    HPI  Hipolito Naranjo is a 30 y.o. female with past medical history of anxiety, depression, smoker, major depressive disorder who presents to the emergency department for evaluation of umbilical redness and pain for the past 2 days. Patient states she was walking by her stairs 2 days ago when her dog knocked her down and she fell down approximately 14 wooden steps. She denies any loss of consciousness head or neck injury. She has no nausea or vomiting since incident. She denies any headache, vision changes, chest pain, abdominal pain, blood in her stool or urine. Also denies any recent illnesses or any other complaints whatsoever. The patient has no other acute complaints at this time. REVIEW OF SYSTEMS   Review of Systems   Constitutional: Negative for chills and fever. HENT: Negative for rhinorrhea, sinus pain and sore throat. Eyes: Negative for redness. Respiratory: Negative for cough and shortness of breath. Cardiovascular: Negative for chest pain. Gastrointestinal: Negative for abdominal pain, diarrhea, nausea and vomiting. Genitourinary: Negative for dysuria. Musculoskeletal: Negative for back pain. Skin: Negative for rash. Umbilicus redness. Neurological: Negative for light-headedness and headaches. Psychiatric/Behavioral: Negative for agitation.          PAST MEDICAL AND SURGICAL HISTORY     Past Medical History:   Diagnosis Date    Abnormal Pap smear and cervical HPV (human papillomavirus) Types: Marijuana     Comment: past history of polysubstance abuse: cannabis, crack, cociane         ALLERGIES     Allergies   Allergen Reactions    Augmentin [Amoxicillin-Pot Clavulanate] Swelling    Bee Venom Hives    Nicotine Swelling     patch    Bee Pollen Swelling and Rash         FAMILY HISTORY     Family History   Problem Relation Age of Onset    Cancer Mother         ovarian in mother  , paternal grandmother- breast cancer    Alcohol Abuse Other          family         PREVIOUS RECORDS   Previous records reviewed: Patient seen 11/4/2020 for acute respiratory infection. PHYSICAL EXAM     ED Triage Vitals [02/11/21 1431]   BP Temp Temp Source Pulse Resp SpO2 Height Weight   (!) 119/59 97.4 °F (36.3 °C) Temporal 86 16 98 % 5' 7\" (1.702 m) 160 lb (72.6 kg)     Initial vital signs and nursing assessment reviewed and normal. Pulsoximetry is normal per my interpretation. Additional Vital Signs:  Vitals:    02/11/21 1505   BP: (!) 143/76   Pulse: 76   Resp: 18   Temp:    SpO2: 99%       Physical Exam  Vitals signs and nursing note reviewed. Constitutional:       General: She is not in acute distress. Appearance: Normal appearance. She is not ill-appearing or toxic-appearing. HENT:      Head: Normocephalic and atraumatic. Right Ear: External ear normal.      Left Ear: External ear normal.      Nose: Nose normal.      Mouth/Throat:      Mouth: Mucous membranes are moist.      Pharynx: Oropharynx is clear. Eyes:      General: No scleral icterus. Extraocular Movements: Extraocular movements intact. Conjunctiva/sclera: Conjunctivae normal.      Pupils: Pupils are equal, round, and reactive to light. Neck:      Musculoskeletal: Normal range of motion and neck supple. No neck rigidity or muscular tenderness. Cardiovascular:      Rate and Rhythm: Normal rate and regular rhythm. Pulses: Normal pulses. Heart sounds: Normal heart sounds. No murmur.    Pulmonary:      Effort: Medications - No data to display      ED COURSE      Strict return precautions and follow up instructions were discussed with the patient prior to discharge, with which the patient agrees. MEDICATION CHANGES     New Prescriptions    CLINDAMYCIN (CLEOCIN) 150 MG CAPSULE    Take 3 capsules by mouth 3 times daily for 7 days         FINAL DISPOSITION     Final diagnoses:   Cellulitis of umbilicus     Condition: condition: good  Dispo: Discharge to home      This transcription was electronically signed. Parts of this transcriptions may have been dictated by use of voice recognition software and electronically transcribed, and parts may have been transcribed with the assistance of an ED scribe. The transcription may contain errors not detected in proofreading. Please refer to my supervising physician's documentation if my documentation differs.     Electronically Signed: Davis Finley, 02/11/21, 4:49 PM         Davis Finley MD  Resident  02/11/21 3605       Davis Finley MD  Resident  02/11/21 2296

## 2021-03-24 ENCOUNTER — OFFICE VISIT (OUTPATIENT)
Dept: FAMILY MEDICINE CLINIC | Age: 36
End: 2021-03-24
Payer: COMMERCIAL

## 2021-03-24 VITALS
SYSTOLIC BLOOD PRESSURE: 106 MMHG | RESPIRATION RATE: 10 BRPM | BODY MASS INDEX: 27.18 KG/M2 | DIASTOLIC BLOOD PRESSURE: 68 MMHG | TEMPERATURE: 98.4 F | HEIGHT: 67 IN | OXYGEN SATURATION: 99 % | WEIGHT: 173.2 LBS | HEART RATE: 75 BPM

## 2021-03-24 DIAGNOSIS — M79.674 PAIN OF TOE OF RIGHT FOOT: ICD-10-CM

## 2021-03-24 DIAGNOSIS — F41.1 GAD (GENERALIZED ANXIETY DISORDER): ICD-10-CM

## 2021-03-24 DIAGNOSIS — L84 CALLUS OF FOOT: Primary | ICD-10-CM

## 2021-03-24 DIAGNOSIS — F33.42 RECURRENT MAJOR DEPRESSIVE DISORDER, IN FULL REMISSION (HCC): ICD-10-CM

## 2021-03-24 PROCEDURE — 4004F PT TOBACCO SCREEN RCVD TLK: CPT | Performed by: NURSE PRACTITIONER

## 2021-03-24 PROCEDURE — G8427 DOCREV CUR MEDS BY ELIG CLIN: HCPCS | Performed by: NURSE PRACTITIONER

## 2021-03-24 PROCEDURE — G8484 FLU IMMUNIZE NO ADMIN: HCPCS | Performed by: NURSE PRACTITIONER

## 2021-03-24 PROCEDURE — G8417 CALC BMI ABV UP PARAM F/U: HCPCS | Performed by: NURSE PRACTITIONER

## 2021-03-24 PROCEDURE — 99214 OFFICE O/P EST MOD 30 MIN: CPT | Performed by: NURSE PRACTITIONER

## 2021-03-24 ASSESSMENT — ENCOUNTER SYMPTOMS
BACK PAIN: 0
RESPIRATORY NEGATIVE: 1
COLOR CHANGE: 1

## 2021-03-24 NOTE — LETTER
[address] [place] [address]. [facility] [address]  Phone: [phone]  Fax: [phone]    NACHO Morales CNP        March 24, 2021     Patient: Bean John   YOB: 1985   Date of Visit: 3/24/2021       To Whom It May Concern: It is my medical opinion that Shemar Obrien please excuse patient from work today. she was seen in the office. .    If you have any questions or concerns, please don't hesitate to call.     Sincerely,        NACHO Moarles CNP

## 2021-03-24 NOTE — PROGRESS NOTES
[address] [place] [address]  [address] EM Gutierrez [address]  Dept: [phone]  Dept Fax: [phone]  Loc: Kelsea Knox is a 30 y.o. femalewho presents today for her medical conditions/complaints as noted below. Denisse Shultz c/o of Foot Problem (painful bump at the tip of the 3rd toe on right foot x 1 week) and Follow-up (2 month f/u on new medication (pt requests to do f/u today))      HPI:      Pt presents with new onset right 3rd toe phalange with a painful lesion at tip. States it has been there for a few weeks, getting worse. She does wear steel toed shoes at work, the tip of toe rubs against her shoe, she does get pedicures. Has not had this before. Has had past surgery on the right foot. Depressed Mood    HPI:    Depressed Mood? yes - but better with the paxil increase to 30 mg daily   Anhedonia?  no  Appetite changes?  no  Sleep disturbances? No, sleeping well with the paxil   Feelings of guilt? no  Decreased energy? yes - she did have decreased energy in the beginning but has mor energy now with the paxil   Impaired concentration? no  Substance abuse? no    Suicidal/Homicidal Ideation? no      Compliant with meds: Yes  Med side effects: no   Sees therapist?:  no  Family History of Mental Illness? yes -     Review of Systems - Psychological ROS: positive for - nothing , negative for - sleep disturbances or suicidal ideation    Anxiety     HPI:    Inciting events or triggers for anxiety - work stressors   Frequency of anxiety - it had been daily but with the paxil it is better, she can ow handle stress better   Panic attacks? They have resolved  Symptoms of panic attacks -  n/a  Sleep Disturbances? No  Impaired concentration? No  Substance abuse? No  Suicidal/Homicidal Ideation? No    Compliant with meds: yes  Med side effects: No    Sees therapist?: No  Family History of Mental Illness?  Yes         Current Outpatient Mood and Affect: Mood normal.         Behavior: Behavior normal.         Thought Content: Thought content normal.         Judgment: Judgment normal.          Assessment/Plan:           1. Callus of foot  Rule out benign lesion, may need to be biopsied  Warm epsom salt water soaks  - Gabriela Ragsdale DPM, Podiatry, [facility] [place] [place]    2. Pain of toe of right foot  #1  Avoid wearing shoes that are too tight  Tylenol for pain   #1  - Gabriela Ragsdale DPM, Podiatry, [place] [place]    3. Recurrent major depressive disorder, in full remission ([facility] [address]  Controlled with current med  Continue current med    4. ISAIAS (generalized anxiety disorder)  #3   Pt in agreement with plan       Return in about 6 months (around 9/24/2021) for physical exam .    Reccommended tobaccocessation options including pharmacologic methods, counseled great than 3 minutesduring this visit:  Yes[]  No  []       Patient given educational materials -see patient instructions. Discussed use, benefit, and side effects of prescribedmedications. All patient questions answered. Pt voiced understanding. Reviewedhealth maintenance. Instructed to continue current medications, diet and exercise. Patient agreed with treatment plan. Follow up as directed.        Electronicallysigned by NACHO Ward CNP on 3/24/2021 at 11:08 AM

## 2021-03-31 ENCOUNTER — TELEPHONE (OUTPATIENT)
Dept: FAMILY MEDICINE CLINIC | Age: 36
End: 2021-03-31

## 2021-07-29 DIAGNOSIS — F32.4 MAJOR DEPRESSIVE DISORDER IN PARTIAL REMISSION, UNSPECIFIED WHETHER RECURRENT (HCC): ICD-10-CM

## 2021-07-29 DIAGNOSIS — F41.0 PANIC ATTACK: ICD-10-CM

## 2021-07-29 RX ORDER — PAROXETINE 30 MG/1
TABLET, FILM COATED ORAL
Qty: 90 TABLET | Refills: 1 | Status: SHIPPED | OUTPATIENT
Start: 2021-07-29 | End: 2022-03-17

## 2021-08-02 ENCOUNTER — TELEMEDICINE (OUTPATIENT)
Dept: FAMILY MEDICINE CLINIC | Age: 36
End: 2021-08-02
Payer: COMMERCIAL

## 2021-08-02 ENCOUNTER — HOSPITAL ENCOUNTER (OUTPATIENT)
Age: 36
Discharge: HOME | End: 2021-08-02
Payer: COMMERCIAL

## 2021-08-02 DIAGNOSIS — J40 BRONCHITIS: Primary | ICD-10-CM

## 2021-08-02 DIAGNOSIS — J01.00 ACUTE MAXILLARY SINUSITIS, RECURRENCE NOT SPECIFIED: ICD-10-CM

## 2021-08-02 DIAGNOSIS — M79.10 MYALGIA: ICD-10-CM

## 2021-08-02 DIAGNOSIS — R09.89 CHEST CONGESTION: ICD-10-CM

## 2021-08-02 DIAGNOSIS — J40 BRONCHITIS: ICD-10-CM

## 2021-08-02 PROCEDURE — 87636 SARSCOV2 & INF A&B AMP PRB: CPT

## 2021-08-02 PROCEDURE — G8427 DOCREV CUR MEDS BY ELIG CLIN: HCPCS | Performed by: NURSE PRACTITIONER

## 2021-08-02 PROCEDURE — 99214 OFFICE O/P EST MOD 30 MIN: CPT | Performed by: NURSE PRACTITIONER

## 2021-08-02 RX ORDER — FLUTICASONE PROPIONATE 50 MCG
1 SPRAY, SUSPENSION (ML) NASAL DAILY
Qty: 2 BOTTLE | Refills: 1 | Status: SHIPPED | OUTPATIENT
Start: 2021-08-02 | End: 2022-03-17

## 2021-08-02 RX ORDER — AZITHROMYCIN 250 MG/1
250 TABLET, FILM COATED ORAL SEE ADMIN INSTRUCTIONS
Qty: 6 TABLET | Refills: 0 | Status: SHIPPED | OUTPATIENT
Start: 2021-08-02 | End: 2021-08-07

## 2021-08-02 RX ORDER — GUAIFENESIN 600 MG/1
600 TABLET, EXTENDED RELEASE ORAL 2 TIMES DAILY
Qty: 30 TABLET | Refills: 0 | Status: SHIPPED | OUTPATIENT
Start: 2021-08-02 | End: 2021-08-17

## 2021-08-02 ASSESSMENT — ENCOUNTER SYMPTOMS
ABDOMINAL PAIN: 0
PHOTOPHOBIA: 0
SHORTNESS OF BREATH: 0
WHEEZING: 0
ABDOMINAL DISTENTION: 0
SINUS PAIN: 1
RHINORRHEA: 1
SORE THROAT: 1
SINUS PRESSURE: 1
NAUSEA: 1
CHOKING: 0
COUGH: 1
CHEST TIGHTNESS: 1
TROUBLE SWALLOWING: 0

## 2021-08-02 NOTE — PROGRESS NOTES
2021    TELEHEALTH EVALUATION -- Audio/Visual (During YQMCZ-73 public health emergency)    HPI:  Visit conducted with pt at home and provider Becka Malloy CNP in office   Rickey Priest (:  1985) has requested an audio/video evaluation for the following concern(s):    URI Symptoms    HPI:      Symptoms have been present for 4 day(s). Symptoms are worse since they initially started. Fever? No  Runny nose or congestion? Yes   Cough? Yes  Sore throat? Yes  Headache, fatigue, joint pains, muscle aches? Yes  Shortness of breath/Wheezing? Has had some wheezing using albuterol inhaler and this does help her although describes it more as chest heaviness than wheezing  Nausea/Vomiting/Diarrhea? Yes - has felt nauseous today   Double Sickening? Yes  Sick contacts? Yes - her  and co-worker have had same symptoms, she did not get the covid vaccines does not think she has had covid yet. Patient has tried otc meds  with out improvement. Review of Systems   Constitutional: Positive for chills and fatigue. Negative for fever. HENT: Positive for congestion, rhinorrhea, sinus pressure, sinus pain and sore throat. Negative for tinnitus and trouble swallowing. Eyes: Negative for photophobia and visual disturbance. Respiratory: Positive for cough and chest tightness. Negative for choking, shortness of breath and wheezing. Gastrointestinal: Positive for nausea. Negative for abdominal distention and abdominal pain. Genitourinary: Negative for difficulty urinating and dysuria. Musculoskeletal: Positive for myalgias. Skin: Negative. Neurological: Positive for headaches. Negative for dizziness, facial asymmetry and light-headedness. Psychiatric/Behavioral: Negative for self-injury, sleep disturbance and suicidal ideas. Prior to Visit Medications    Medication Sig Taking?  Authorizing Provider   azithromycin (ZITHROMAX) 250 MG tablet Take 1 tablet by mouth See Admin Instructions for 5 days 500mg on day 1 followed by 250mg on days 2 - 5 Yes NACHO Wong CNP   guaiFENesin (MUCINEX) 600 MG extended release tablet Take 1 tablet by mouth 2 times daily for 15 days Yes NACHO Wong CNP   fluticasone (FLONASE) 50 MCG/ACT nasal spray 1 spray by Each Nostril route daily Yes NACHO Wong CNP   PARoxetine (PAXIL) 30 MG tablet take 1 tablet by mouth once daily  NACHO Wong CNP   albuterol sulfate HFA (PROVENTIL HFA) 108 (90 Base) MCG/ACT inhaler Inhale 2 puffs into the lungs every 6 hours as needed for Wheezing  NACHO Wong CNP   gabapentin (NEURONTIN) 300 MG capsule Take 300 mg by mouth daily. Patient weaning herself off  Historical Provider, MD   Pseudoephedrine-guaiFENesin (MUCINEX D MAX STRENGTH) 120-1200 MG TB12 Take 1 tablet by mouth 2 times daily as needed (cough/congestion)  NACHO Byers CNP   acetaminophen (TYLENOL) 325 MG tablet Take 650 mg by mouth every 6 hours as needed for Pain  Historical Provider, MD       Social History     Tobacco Use    Smoking status: Current Every Day Smoker     Packs/day: 0.50     Years: 16.00     Pack years: 8.00     Types: Cigarettes     Last attempt to quit: 12/15/2015     Years since quittin.6    Smokeless tobacco: Never Used   Vaping Use    Vaping Use: Former    Substances: Occasionally   Substance Use Topics    Alcohol use:  Yes     Alcohol/week: 1.0 standard drinks     Types: 1 Glasses of wine per week     Comment: occasionally    Drug use: No     Frequency: 7.0 times per week     Types: Marijuana     Comment: past history of polysubstance abuse: cannabis, crack, cociane        Allergies   Allergen Reactions    Augmentin [Amoxicillin-Pot Clavulanate] Swelling    Bee Venom Hives    Nicotine Swelling     patch    Bee Pollen Swelling and Rash   ,   Past Medical History:   Diagnosis Date    Abnormal Pap smear and cervical HPV (human papillomavirus)     Anxiety     Asthma     Depression     PTSD (post-traumatic stress disorder)     Smoker     Substance abuse ([facility] [address]     history of    Tonsillitis     recurrent   ,   Past Surgical History:   Procedure Laterality Date    DENTAL SURGERY      FOOT SURGERY Right 14    HYSTERECTOMY ABDOMINAL N/A 2019    ROBOTIC HYSTER W/ BS performed by Nuvia Edmondson MD at [address] [place] [address]  2012   ,   Social History     Tobacco Use    Smoking status: Current Every Day Smoker     Packs/day: 0.50     Years: 16.00     Pack years: 8.00     Types: Cigarettes     Last attempt to quit: 12/15/2015     Years since quittin.6    Smokeless tobacco: Never Used   Vaping Use    Vaping Use: Former    Substances: Occasionally   Substance Use Topics    Alcohol use:  Yes     Alcohol/week: 1.0 standard drinks     Types: 1 Glasses of wine per week     Comment: occasionally    Drug use: No     Frequency: 7.0 times per week     Types: Marijuana     Comment: past history of polysubstance abuse: cannabis, crack, cociane   ,   Family History   Problem Relation Age of Onset    Cancer Mother         ovarian in mother  , paternal grandmother- breast cancer    Alcohol Abuse Other          family   ,   Immunization History   Administered Date(s) Administered    Tdap (Boostrix, Adacel) 2016   ,   Health Maintenance   Topic Date Due    Pneumococcal 0-64 years Vaccine (1 of 2 - PPSV23) Never done    COVID-19 Vaccine (1) Never done    Flu vaccine (1) 2021    DTaP/Tdap/Td vaccine (2 - Td or Tdap) 2026    Hepatitis C screen  Completed    HIV screen  Completed    Hepatitis A vaccine  Aged Out    Hepatitis B vaccine  Aged Out    Hib vaccine  Aged Out    Meningococcal (ACWY) vaccine  Aged Out    Varicella vaccine  Discontinued       PHYSICAL EXAMINATION:  [ INSTRUCTIONS:  \"[x]\" Indicates a positive item  \"[]\" Indicates a negative item  -- DELETE ALL ITEMS NOT EXAMINED]  Vital Signs: (As obtained by patient/caregiver or practitioner observation)    Blood pressure-  Heart rate-    Respiratory rate-    Temperature-  Pulse oximetry-     Constitutional: [] Appears well-developed and well-nourished [x] No apparent distress      [x] Abnormal- pt appears sick but no tin distress  Mental status  [x] Alert and awake  [x] Oriented to person/place/time []Able to follow commands      Eyes:  EOM    []  Normal  [] Abnormal-  Sclera  []  Normal  [] Abnormal -         Discharge []  None visible  [x] Abnormal -clear nasal discharge   HENT:   [] Normocephalic, atraumatic. [] Abnormal   [x] Mouth/Throat: Mucous membranes are moist.     External Ears [] Normal  [] Abnormal-     Neck: [] No visualized mass     Pulmonary/Chest: [x] Respiratory effort normal.  [x] No visualized signs of difficulty breathing or respiratory distress, able to talk in full sentences without sob       [] Abnormal-      Musculoskeletal:   [] Normal gait with no signs of ataxia         [] Normal range of motion of neck        [] Abnormal-       Neurological:        [] No Facial Asymmetry (Cranial nerve 7 motor function) (limited exam to video visit)          [] No gaze palsy        [] Abnormal-         Skin:        [x] No significant exanthematous lesions or discoloration noted on facial skin         [] Abnormal-            Psychiatric:       [x] Normal Affect [] No Hallucinations        [] Abnormal-     Other pertinent observable physical exam findings-     ASSESSMENT/PLAN:  1. Bronchitis  rule out covid  Start taking vitamin c , d and zinc to boost immune system  If develop difficulty with breathing go to ER. Pt agreeable to plan   No work until covid test is back   - COVID-19; Future  - azithromycin (ZITHROMAX) 250 MG tablet; Take 1 tablet by mouth See Admin Instructions for 5 days 500mg on day 1 followed by 250mg on days 2 - 5  Dispense: 6 tablet; Refill: 0    2. Chest congestion    - COVID-19; Future  - azithromycin (ZITHROMAX) 250 MG tablet;  Take 1 tablet by mouth See Admin Instructions for 5 days 500mg on day 1 followed by 250mg on days 2 - 5  Dispense: 6 tablet; Refill: 0    3. Myalgia    - COVID-19; Future    4. Acute maxillary sinusitis, recurrence not specified  As aobve   Tylenol as needed for pain   - azithromycin (ZITHROMAX) 250 MG tablet; Take 1 tablet by mouth See Admin Instructions for 5 days 500mg on day 1 followed by 250mg on days 2 - 5  Dispense: 6 tablet; Refill: 0      Return if symptoms worsen or fail to improve. Gracia Joe, was evaluated through a synchronous (real-time) audio-video encounter. The patient (or guardian if applicable) is aware that this is a billable service. Verbal consent to proceed has been obtained within the past 12 months. The visit was conducted pursuant to the emergency declaration under the [address] [place] [address], [address] [place] [address] authority and the [employer] General Act. Patient identification was verified, and a caregiver was present when appropriate. The patient was located in a state where the provider was credentialed to provide care. Total time spent on this encounter: Not billed by time    --NACHO Miller - CNP on 8/2/2021 at 3:30 PM    An electronic signature was used to authenticate this note.

## 2021-08-03 ENCOUNTER — TELEPHONE (OUTPATIENT)
Dept: FAMILY MEDICINE CLINIC | Age: 36
End: 2021-08-03

## 2021-08-03 LAB
INFLUENZA A: NOT DETECTED
INFLUENZA B: NOT DETECTED
SARS-COV-2 RNA, RT PCR: NOT DETECTED

## 2021-08-03 NOTE — TELEPHONE ENCOUNTER
----- Message from NACHO Ibrahim CNP sent at 8/3/2021  8:00 AM EDT -----  Let pt know her covid test is negative, she can RTW when she feels better today or tomorrow thanks

## 2021-09-28 ENCOUNTER — OFFICE VISIT (OUTPATIENT)
Dept: FAMILY MEDICINE CLINIC | Age: 36
End: 2021-09-28
Payer: COMMERCIAL

## 2021-09-28 VITALS
BODY MASS INDEX: 27.28 KG/M2 | HEIGHT: 67 IN | SYSTOLIC BLOOD PRESSURE: 134 MMHG | TEMPERATURE: 98.1 F | HEART RATE: 86 BPM | DIASTOLIC BLOOD PRESSURE: 62 MMHG | WEIGHT: 173.8 LBS | RESPIRATION RATE: 12 BRPM | OXYGEN SATURATION: 98 %

## 2021-09-28 DIAGNOSIS — F41.9 ANXIETY: ICD-10-CM

## 2021-09-28 DIAGNOSIS — Z72.0 TOBACCO ABUSE: ICD-10-CM

## 2021-09-28 DIAGNOSIS — J30.89 SEASONAL ALLERGIC RHINITIS DUE TO OTHER ALLERGIC TRIGGER: ICD-10-CM

## 2021-09-28 DIAGNOSIS — Z00.00 PHYSICAL EXAM: Primary | ICD-10-CM

## 2021-09-28 PROCEDURE — 99213 OFFICE O/P EST LOW 20 MIN: CPT | Performed by: NURSE PRACTITIONER

## 2021-09-28 PROCEDURE — G8427 DOCREV CUR MEDS BY ELIG CLIN: HCPCS | Performed by: NURSE PRACTITIONER

## 2021-09-28 PROCEDURE — 4004F PT TOBACCO SCREEN RCVD TLK: CPT | Performed by: NURSE PRACTITIONER

## 2021-09-28 PROCEDURE — 99395 PREV VISIT EST AGE 18-39: CPT | Performed by: NURSE PRACTITIONER

## 2021-09-28 PROCEDURE — G8417 CALC BMI ABV UP PARAM F/U: HCPCS | Performed by: NURSE PRACTITIONER

## 2021-09-28 NOTE — PROGRESS NOTES
Chief Complaint   Patient presents with    Annual Exam         SUBJECTIVE:  Leyda Garcia is a 29 y.o. female for physical exam.    Diet - No specific diet plan, tries to eat a variety of foods. Exercise - no formal exercise plan  Sleep - sleeping ok  Mood - OK has not taken pzxil in 1 month feels good for now. Stress has been reduced. Health Maintenance reviewed with patient today. Anxiety     HPI:    Inciting events or triggers for anxiety - it had been work related   Frequency of anxiety - it had been daily, but better now has a now living arrangement   Panic attacks? Not lately   Symptoms of panic attacks -  n/a  Sleep Disturbances? No  Impaired concentration? No  Substance abuse? No  Suicidal/Homicidal Ideation? No    Compliant with meds: stopped them a  Month ago and thinks for now doing OK without them. Will monitor   Med side effects: No    Sees therapist?: No  Family History of Mental Illness? Yes    Smokes 1/2 pack a day, does not want to stop      Uses flonase as needed for allergic rhinitis and claritin they work well.      Past Medical History:   Diagnosis Date    Abnormal Pap smear and cervical HPV (human papillomavirus)     Anxiety     Asthma     Depression     PTSD (post-traumatic stress disorder)     Smoker     Substance abuse (HCC)     history of    Tonsillitis     recurrent       Past Surgical History:   Procedure Laterality Date    DENTAL SURGERY      FOOT SURGERY Right 08/26/14    HYSTERECTOMY ABDOMINAL N/A 8/5/2019    ROBOTIC HYSTER W/ BS performed by Jeremy Min MD at [address] [place] [address]  1/2012       Social History     Socioeconomic History    Marital status:      Spouse name: Not on file    Number of children: 3    Years of education: 15    Highest education level: Not on file   Occupational History    Occupation: homemaker   Tobacco Use    Smoking status: Current Every Day Smoker     Packs/day: 0.50     Years: 16.00     Pack years: 8.00     Types: Cigarettes     Last attempt to quit: 12/15/2015     Years since quittin.7    Smokeless tobacco: Never Used   Vaping Use    Vaping Use: Former    Substances: Occasionally   Substance and Sexual Activity    Alcohol use: Yes     Alcohol/week: 1.0 standard drinks     Types: 1 Glasses of wine per week     Comment: occasionally    Drug use: No     Frequency: 7.0 times per week     Types: Marijuana     Comment: past history of polysubstance abuse: cannabis, crack, cociane    Sexual activity: Yes     Partners: Male     Comment: tubal ligation   Other Topics Concern    Not on file   Social History Narrative    Not on file     Social Determinants of Health     Financial Resource Strain:     Difficulty of Paying Living Expenses:    Food Insecurity:     Worried About Running Out of Food in the Last Year:     Ran Out of Food in the Last Year:    Transportation Needs:     Lack of Transportation (Medical):  Lack of Transportation (Non-Medical):    Physical Activity:     Days of Exercise per Week:     Minutes of Exercise per Session:    Stress:     Feeling of Stress :    Social Connections:     Frequency of Communication with Friends and Family:     Frequency of Social Gatherings with Friends and Family:     Attends Protestant Services:     Active Member of Clubs or Organizations:     Attends Club or Organization Meetings:     Marital Status:    Intimate Partner Violence:     Fear of Current or Ex-Partner:     Emotionally Abused:     Physically Abused:     Sexually Abused:        Family History   Problem Relation Age of Onset    Cancer Mother         ovarian in mother  , paternal grandmother- breast cancer    Alcohol Abuse Other          family           I have reviewed the patient's past medical history, past surgical history, allergies, medications, social and family history and I have made updates where appropriate.           PHYSICAL EXAM:  /62   Pulse 86   Temp 98.1 °F (36.7 °C) (Oral)   Resp 12   Ht 5' 7\" (1.702 m)   Wt 173 lb 12.8 oz (78.8 kg)   LMP 08/18/2019   SpO2 98%   BMI 27.22 kg/m²       Physical Exam  Vitals and nursing note reviewed. Constitutional:       Appearance: She is not ill-appearing. HENT:      Right Ear: Tympanic membrane, ear canal and external ear normal.      Left Ear: Tympanic membrane, ear canal and external ear normal.      Nose: Nose normal.      Mouth/Throat:      Mouth: Mucous membranes are moist.   Cardiovascular:      Rate and Rhythm: Normal rate and regular rhythm. Pulses: Normal pulses. Heart sounds: Normal heart sounds. No murmur heard. Pulmonary:      Effort: Pulmonary effort is normal. No respiratory distress. Breath sounds: Normal breath sounds. No wheezing. Abdominal:      General: Abdomen is flat. Bowel sounds are normal. There is no distension. Palpations: Abdomen is soft. Musculoskeletal:         General: Normal range of motion. Skin:     General: Skin is warm and dry. Capillary Refill: Capillary refill takes less than 2 seconds. Neurological:      General: No focal deficit present. Mental Status: She is alert. Psychiatric:         Attention and Perception: Attention normal.         Mood and Affect: Mood is anxious. Speech: Speech is rapid and pressured. Behavior: Behavior normal. Behavior is cooperative. Thought Content:  Thought content normal.         Cognition and Memory: Cognition normal.         Judgment: Judgment normal.             ASSESSMENT & PLAN  Edenilson Man was seen today for annual exam.    Diagnoses and all orders for this visit:    Physical exam    Seasonal allergic rhinitis due to other allergic trigger  Controlled with current meds  Anxiety  Stable for now  Will monitor   Tobacco abuse  Declines smoking cessation meds      Return in about 1 year (around 9/28/2022) for physical exam.    Counseling was provided today regarding the following topics:

## 2021-09-28 NOTE — PATIENT INSTRUCTIONS
Patient Education        Well Visit, Ages 25 to 48: Care Instructions  Overview     Well visits can help you stay healthy. Your doctor has checked your overall health and may have suggested ways to take good care of yourself. Your doctor also may have recommended tests. At home, you can help prevent illness with healthy eating, regular exercise, and other steps. Follow-up care is a key part of your treatment and safety. Be sure to make and go to all appointments, and call your doctor if you are having problems. It's also a good idea to know your test results and keep a list of the medicines you take. How can you care for yourself at home? · Get screening tests that you and your doctor decide on. Screening helps find diseases before any symptoms appear. · Eat healthy foods. Choose fruits, vegetables, whole grains, protein, and low-fat dairy foods. Limit fat, especially saturated fat. Reduce salt in your diet. · Limit alcohol. If you are a man, have no more than 2 drinks a day or 14 drinks a week. If you are a woman, have no more than 1 drink a day or 7 drinks a week. · Get at least 30 minutes of physical activity on most days of the week. Walking is a good choice. You also may want to do other activities, such as running, swimming, cycling, or playing tennis or team sports. Discuss any changes in your exercise program with your doctor. · Reach and stay at a healthy weight. This will lower your risk for many problems, such as obesity, diabetes, heart disease, and high blood pressure. · Do not smoke or allow others to smoke around you. If you need help quitting, talk to your doctor about stop-smoking programs and medicines. These can increase your chances of quitting for good. · Care for your mental health. It is easy to get weighed down by worry and stress. Learn strategies to manage stress, like deep breathing and mindfulness, and stay connected with your family and community.  If you find you often feel sad or hopeless, talk with your doctor. Treatment can help. · Talk to your doctor about whether you have any risk factors for sexually transmitted infections (STIs). You can help prevent STIs if you wait to have sex with a new partner (or partners) until you've each been tested for STIs. It also helps if you use condoms (male or female condoms) and if you limit your sex partners to one person who only has sex with you. Vaccines are available for some STIs, such as HPV. · Use birth control if it's important to you to prevent pregnancy. Talk with your doctor about the choices available and what might be best for you. · If you think you may have a problem with alcohol or drug use, talk to your doctor. This includes prescription medicines (such as amphetamines and opioids) and illegal drugs (such as cocaine and methamphetamine). Your doctor can help you figure out what type of treatment is best for you. · Protect your skin from too much sun. When you're outdoors from 10 a.m. to 4 p.m., stay in the shade or cover up with clothing and a hat with a wide brim. Wear sunglasses that block UV rays. Even when it's cloudy, put broad-spectrum sunscreen (SPF 30 or higher) on any exposed skin. · See a dentist one or two times a year for checkups and to have your teeth cleaned. · Wear a seat belt in the car. When should you call for help? Watch closely for changes in your health, and be sure to contact your doctor if you have any problems or symptoms that concern you. Where can you learn more? Go to https://[employer]ancia.[employer]. org and sign in to your [employer] account. Enter P072 in the RedKLEVER box to learn more about \"Well Visit, Ages 25 to 48: Care Instructions. \"     If you do not have an account, please click on the \"Sign Up Now\" link. Current as of: February 11, 2021               Content Version: 13.0  © 7346-2410 Healthwise, Incorporated.    Care instructions adapted under license by [facility]. If you have questions about a medical condition or this instruction, always ask your healthcare professional. [place] [address] any warranty or liability for your use of this information.

## 2021-10-07 ENCOUNTER — TELEPHONE (OUTPATIENT)
Dept: FAMILY MEDICINE CLINIC | Age: 36
End: 2021-10-07

## 2021-10-07 DIAGNOSIS — E87.5 HYPERKALEMIA: Primary | ICD-10-CM

## 2021-10-07 LAB
ABSOLUTE BASO #: 0.1 X10E9/L (ref 0–0.2)
ABSOLUTE EOS #: 0.2 X10E9/L (ref 0–0.4)
ABSOLUTE LYMPH #: 2 X10E9/L (ref 1–3.5)
ABSOLUTE MONO #: 0.7 X10E9/L (ref 0–0.9)
ABSOLUTE NEUT #: 5.1 X10E9/L (ref 1.5–6.6)
ANION GAP SERPL CALCULATED.3IONS-SCNC: 7 MMOL/L (ref 5–15)
BASOPHILS RELATIVE PERCENT: 0.8 %
BUN BLDV-MCNC: 13 MG/DL (ref 5–23)
CALCIUM SERPL-MCNC: 9.7 MG/DL (ref 8.5–10.5)
CHLORIDE BLD-SCNC: 108 MMOL/L (ref 98–109)
CO2: 29 MMOL/L (ref 22–32)
CREAT SERPL-MCNC: 0.83 MG/DL (ref 0.4–1)
EGFR AFRICAN AMERICAN: >60 ML/MIN/1.73SQ.M
EGFR IF NONAFRICAN AMERICAN: >60 ML/MIN/1.73SQ.M
EOSINOPHILS RELATIVE PERCENT: 2.9 %
GLUCOSE: 90 MG/DL (ref 65–99)
HCT VFR BLD CALC: 42.7 % (ref 35–47)
HEMOGLOBIN: 14.4 G/DL (ref 11.7–15.5)
LYMPHOCYTE %: 25 %
MCH RBC QN AUTO: 33.1 PG (ref 27–34)
MCHC RBC AUTO-ENTMCNC: 33.8 G/DL (ref 32–36)
MCV RBC AUTO: 98 FL (ref 80–100)
MONOCYTES # BLD: 8.3 %
NEUTROPHILS RELATIVE PERCENT: 63 %
PDW BLD-RTO: 13.9 % (ref 11.5–15)
PLATELETS: 355 X10E9/L (ref 150–450)
PMV BLD AUTO: 7.9 FL (ref 7–12)
POTASSIUM SERPL-SCNC: 5.3 MMOL/L (ref 3.5–5)
RBC: 4.36 X10E12/L (ref 3.8–5.2)
SODIUM BLD-SCNC: 144 MMOL/L (ref 134–146)
TSH SERPL DL<=0.05 MIU/L-ACNC: 2.07 UIU/ML (ref 0.49–4.67)
WBC: 8.1 X10E9/L (ref 4–11)

## 2021-10-07 NOTE — TELEPHONE ENCOUNTER
----- Message from NACHO Jorge CNP sent at 10/7/2021 11:19 AM EDT -----  Let pt know most labs look good,  thyroid normal range,   sugar in normal range and renal function normal . Her potassium is slightly elevated at 5.3 normal range is up to 5.0. She is not on any meds that would cause her potassium to be elevated. I recommend she repeat this level in 3 weeks to make sure it is not rising.  Orders placed

## 2021-11-23 ENCOUNTER — TELEMEDICINE (OUTPATIENT)
Dept: FAMILY MEDICINE CLINIC | Age: 36
End: 2021-11-23
Payer: COMMERCIAL

## 2021-11-23 ENCOUNTER — TELEPHONE (OUTPATIENT)
Dept: FAMILY MEDICINE CLINIC | Age: 36
End: 2021-11-23

## 2021-11-23 DIAGNOSIS — R09.82 POSTNASAL DRIP: ICD-10-CM

## 2021-11-23 DIAGNOSIS — R51.9 NONINTRACTABLE HEADACHE, UNSPECIFIED CHRONICITY PATTERN, UNSPECIFIED HEADACHE TYPE: ICD-10-CM

## 2021-11-23 DIAGNOSIS — J34.89 RHINORRHEA: ICD-10-CM

## 2021-11-23 DIAGNOSIS — U07.1 LAB TEST POSITIVE FOR DETECTION OF COVID-19 VIRUS: Primary | ICD-10-CM

## 2021-11-23 PROCEDURE — G8427 DOCREV CUR MEDS BY ELIG CLIN: HCPCS | Performed by: NURSE PRACTITIONER

## 2021-11-23 PROCEDURE — 99214 OFFICE O/P EST MOD 30 MIN: CPT | Performed by: NURSE PRACTITIONER

## 2021-11-23 RX ORDER — GUAIFENESIN 600 MG/1
600 TABLET, EXTENDED RELEASE ORAL 2 TIMES DAILY
Qty: 30 TABLET | Refills: 0 | Status: SHIPPED | OUTPATIENT
Start: 2021-11-23 | End: 2021-12-08

## 2021-11-23 RX ORDER — LORATADINE 10 MG/1
10 TABLET ORAL DAILY
Qty: 30 TABLET | Refills: 3 | Status: SHIPPED | OUTPATIENT
Start: 2021-11-23 | End: 2022-03-17

## 2021-11-23 RX ORDER — KETOROLAC TROMETHAMINE 10 MG/1
10 TABLET, FILM COATED ORAL EVERY 6 HOURS PRN
Qty: 20 TABLET | Refills: 0 | Status: SHIPPED | OUTPATIENT
Start: 2021-11-23 | End: 2022-07-06 | Stop reason: ALTCHOICE

## 2021-11-23 ASSESSMENT — ENCOUNTER SYMPTOMS
CHEST TIGHTNESS: 0
COUGH: 1
DIARRHEA: 0
NAUSEA: 1
SINUS PRESSURE: 0
SINUS PAIN: 0
SORE THROAT: 0
RHINORRHEA: 1
CHOKING: 0
WHEEZING: 0
SHORTNESS OF BREATH: 0
CONSTIPATION: 0

## 2021-11-23 NOTE — LETTER
[address]. [facility] [address]  Phone: [phone]  Fax: [phone]    NACHO Donis CNP        November 23, 2021     Patient: Bigg Martin   YOB: 1985   Date of Visit: 11/23/2021       To Whom It May Concern: It is my medical opinion that Daniel Wilson please excuse patient from work from November 20th, 2021 through November 28th, 2021. Patient may return to work at full duty Monday Novmeber 29th, 2021. .    If you have any questions or concerns, please don't hesitate to call.     Sincerely,        NACHO Donis CNP

## 2021-11-23 NOTE — PROGRESS NOTES
2021    TELEHEALTH EVALUATION -- Audio/Visual (During CNPGX-81 public health emergency)    HPI:  Visit conducted with pt at home and provider Do Cunningham CNP in office   Jose Celaya (:  1985) has requested an audio/video evaluation for the following concern(s):    URI Symptoms    HPI:      Symptoms have been present for 7 day(s). Symptoms are better since they initially started. Fever? No  Runny nose or congestion? Yes   Cough? Yes  Sore throat? No  Headache, fatigue, joint pains, muscle aches? Yes - but getting better, tested positive for covid on , has been off work since that time. Shortness of breath/Wheezing? No  Nausea/Vomiting/Diarrhea? Yes - feeling a little nauseous   Double Sickening? No  Sick contacts? Yes    Patient has tried otc meds with fglonase with some  improvement. Review of Systems   Constitutional: Positive for fatigue. Negative for chills and fever. HENT: Positive for congestion, postnasal drip and rhinorrhea. Negative for sinus pressure, sinus pain, sneezing and sore throat. Respiratory: Positive for cough. Negative for choking, chest tightness, shortness of breath and wheezing. Cardiovascular: Negative. Gastrointestinal: Positive for nausea. Negative for constipation and diarrhea. Musculoskeletal: Positive for arthralgias and myalgias. Skin: Negative. Neurological: Positive for headaches (headaches have been bad since having covid, takign ibuprofen 800 mg tid). Negative for dizziness, facial asymmetry, weakness, light-headedness and numbness. Prior to Visit Medications    Medication Sig Taking?  Authorizing Provider   ketorolac (TORADOL) 10 MG tablet Take 1 tablet by mouth every 6 hours as needed for Pain Yes NACHO Martinez CNP   loratadine (CLARITIN) 10 MG tablet Take 1 tablet by mouth daily Yes NACHO Martinez CNP   guaiFENesin (MUCINEX) 600 MG extended release tablet Take 1 tablet by mouth 2 times daily for 15 days Yes Cresenciano Bitters, APRN - CNP   fluticasone [facility]) 50 MCG/ACT nasal spray 1 spray by Each Nostril route daily  NACHO Aguilar CNP   PARoxetine (PAXIL) 30 MG tablet take 1 tablet by mouth once daily  Patient not taking: Reported on 2021  Cresenciano Bitters, NACHO - CNP   albuterol sulfate HFA (PROVENTIL HFA) 108 (90 Base) MCG/ACT inhaler Inhale 2 puffs into the lungs every 6 hours as needed for Wheezing  Cresenciano BittersNACHO - ABRAM   gabapentin (NEURONTIN) 300 MG capsule Take 300 mg by mouth daily. Patient weaning herself off  Historical Provider, MD   Pseudoephedrine-guaiFENesin (MUCINEX D MAX STRENGTH) 120-1200 MG TB12 Take 1 tablet by mouth 2 times daily as needed (cough/congestion)  Patient not taking: Reported on 2021  NACHO Alston Do, CNP   acetaminophen (TYLENOL) 325 MG tablet Take 650 mg by mouth every 6 hours as needed for Pain  Historical Provider, MD       Social History     Tobacco Use    Smoking status: Current Every Day Smoker     Packs/day: 0.50     Years: 16.00     Pack years: 8.00     Types: Cigarettes     Last attempt to quit: 12/15/2015     Years since quittin.9    Smokeless tobacco: Never Used   Vaping Use    Vaping Use: Former    Substances: Occasionally   Substance Use Topics    Alcohol use:  Yes     Alcohol/week: 1.0 standard drink     Types: 1 Glasses of wine per week     Comment: occasionally    Drug use: No     Frequency: 7.0 times per week     Types: Marijuana (Weed)     Comment: past history of polysubstance abuse: cannabis, crack, cociane        Allergies   Allergen Reactions    Augmentin [Amoxicillin-Pot Clavulanate] Swelling    Bee Venom Hives    Nicotine Swelling     patch    Bee Pollen Swelling and Rash   ,   Past Medical History:   Diagnosis Date    Abnormal Pap smear and cervical HPV (human papillomavirus)     Anxiety     Asthma     Depression     PTSD (post-traumatic stress disorder)     Smoker     Substance abuse ([facility] [address]     history of    Tonsillitis     recurrent   ,   Past Surgical History:   Procedure Laterality Date    DENTAL SURGERY      FOOT SURGERY Right 14    HYSTERECTOMY ABDOMINAL N/A 2019    ROBOTIC HYSTER W/ BS performed by Jim[facility] Summers MD at [facility]  2012   ,   Social History     Tobacco Use    Smoking status: Current Every Day Smoker     Packs/day: 0.50     Years: 16.00     Pack years: 8.00     Types: Cigarettes     Last attempt to quit: 12/15/2015     Years since quittin.9    Smokeless tobacco: Never Used   Vaping Use    Vaping Use: Former    Substances: Occasionally   Substance Use Topics    Alcohol use:  Yes     Alcohol/week: 1.0 standard drink     Types: 1 Glasses of wine per week     Comment: occasionally    Drug use: No     Frequency: 7.0 times per week     Types: Marijuana (Weed)     Comment: past history of polysubstance abuse: cannabis, crack, cociane   ,   Family History   Problem Relation Age of Onset    Cancer Mother         ovarian in mother  , paternal grandmother- breast cancer    Alcohol Abuse Other          family   ,   Immunization History   Administered Date(s) Administered    Tdap (Boostrix, Adacel) 2016   ,   Health Maintenance   Topic Date Due    COVID-19 Vaccine (1) Never done    Pneumococcal 0-64 years Vaccine (1 of 2 - PPSV23) Never done    Flu vaccine (1) Never done    DTaP/Tdap/Td vaccine (2 - Td or Tdap) 2026    Hepatitis C screen  Completed    HIV screen  Completed    Hepatitis A vaccine  Aged Out    Hepatitis B vaccine  Aged Out    Hib vaccine  Aged Out    Meningococcal (ACWY) vaccine  Aged Out    Varicella vaccine  Discontinued       PHYSICAL EXAMINATION:  [ INSTRUCTIONS:  \"[x]\" Indicates a positive item  \"[]\" Indicates a negative item  -- DELETE ALL ITEMS NOT EXAMINED]  Vital Signs: (As obtained by patient/caregiver or practitioner observation)    Blood pressure-  Heart rate-    Respiratory rate-    Temperature-  Pulse oximetry-     Constitutional: [] Appears well-developed and well-nourished [x] No apparent distress      [x] Abnormal-   Mental status appears ill   [x] Alert and awake  [x] Oriented to person/place/time [x]Able to follow commands      Eyes:  EOM    []  Normal  [] Abnormal-  Sclera  [x]  Normal  [] Abnormal -         Discharge [x]  None visible  [] Abnormal -    HENT:   [] Normocephalic, atraumatic. [x] Abnormal nasal congestion noted   [x] Mouth/Throat: Mucous membranes are moist.     External Ears [] Normal  [] Abnormal-     Neck: [] No visualized mass     Pulmonary/Chest: [x] Respiratory effort normal.  [x] No visualized signs of difficulty breathing or respiratory distress        [] Abnormal-      Musculoskeletal:   [] Normal gait with no signs of ataxia         [] Normal range of motion of neck        [] Abnormal-       Neurological:        [] No Facial Asymmetry (Cranial nerve 7 motor function) (limited exam to video visit)          [] No gaze palsy        [] Abnormal-         Skin:        [x] No significant exanthematous lesions or discoloration noted on facial skin         [] Abnormal-            Psychiatric:       [] Normal Affect [] No Hallucinations        [] Abnormal-     Other pertinent observable physical exam findings-     ASSESSMENT/PLAN:  1. Lab test positive for detection of COVID-19 virus  Monitor  Vitamin c, d and zinc  Increase fluid intake  May RTW November 29th work note written. 2. Postnasal drip    - loratadine (CLARITIN) 10 MG tablet; Take 1 tablet by mouth daily  Dispense: 30 tablet; Refill: 3    3. Nonintractable headache, unspecified chronicity pattern, unspecified headache type  toradol  monitor  4. Rhinorrhea  claritin  flonase  Pt in agreement with plan     Return if symptoms worsen or fail to improve. Jair Reilly was evaluated through a synchronous (real-time) audio-video encounter. The patient (or guardian if applicable) is aware that this is a billable service.  Verbal consent to proceed has been obtained within the past 12 months. The visit was conducted pursuant to the emergency declaration under the [address] [place] [address] and the [place] and [employer] General Act. Patient identification was verified, and a caregiver was present when appropriate. The patient was located in a state where the provider was credentialed to provide care. Total time spent on this encounter: Not billed by time    --NACHO Guthrie CNP on 11/23/2021 at 3:55 PM    An electronic signature was used to authenticate this note.

## 2021-11-23 NOTE — TELEPHONE ENCOUNTER
RTW letter written it can be emailed to her but please confirm e-mail Chart says [email or web address]

## 2021-12-15 ENCOUNTER — E-VISIT (OUTPATIENT)
Dept: OTHER | Facility: CLINIC | Age: 36
End: 2021-12-15
Payer: COMMERCIAL

## 2021-12-15 DIAGNOSIS — J40 BRONCHITIS: Primary | ICD-10-CM

## 2021-12-15 DIAGNOSIS — R09.81 NASAL CONGESTION: ICD-10-CM

## 2021-12-15 DIAGNOSIS — J01.00 ACUTE NON-RECURRENT MAXILLARY SINUSITIS: ICD-10-CM

## 2021-12-15 PROCEDURE — 99422 OL DIG E/M SVC 11-20 MIN: CPT | Performed by: NURSE PRACTITIONER

## 2021-12-15 RX ORDER — FLUTICASONE PROPIONATE 50 MCG
1 SPRAY, SUSPENSION (ML) NASAL DAILY
Qty: 32 G | Refills: 1 | Status: SHIPPED | OUTPATIENT
Start: 2021-12-15

## 2021-12-15 RX ORDER — AZITHROMYCIN 250 MG/1
250 TABLET, FILM COATED ORAL SEE ADMIN INSTRUCTIONS
Qty: 6 TABLET | Refills: 0 | Status: SHIPPED | OUTPATIENT
Start: 2021-12-15 | End: 2021-12-20

## 2021-12-15 ASSESSMENT — LIFESTYLE VARIABLES
SMOKING_STATUS: YES
SMOKING_YEARS: 15

## 2021-12-15 NOTE — PROGRESS NOTES
URI Symptoms    HPI:      Symptoms have been present for 3 day(s). Symptoms are unchanged since they initially started. Fever? No  Runny nose or congestion? Yes   Cough? Yes  Sore throat? No  Headache, fatigue, joint pains, muscle aches? No  Shortness of breath/Wheezing? No  Nausea/Vomiting/Diarrhea? No  Double Sickening? No  Sick contacts? No    Patient has tried otc meds with out improvement. Pt admits to sinus pain and pressure, nasal congestion Denies N/V/D, does have a history of sinus issues. Encounter Diagnoses   Name Primary?  Bronchitis Yes    Acute non-recurrent maxillary sinusitis     Nasal congestion      Covid test ordered, Zpak and flonase  Given instructions to call if condition worsens. Andie Brewer

## 2022-01-25 ENCOUNTER — OFFICE VISIT (OUTPATIENT)
Dept: FAMILY MEDICINE CLINIC | Age: 37
End: 2022-01-25
Payer: COMMERCIAL

## 2022-01-25 VITALS
BODY MASS INDEX: 26.93 KG/M2 | WEIGHT: 171.6 LBS | SYSTOLIC BLOOD PRESSURE: 126 MMHG | OXYGEN SATURATION: 98 % | DIASTOLIC BLOOD PRESSURE: 70 MMHG | TEMPERATURE: 98.3 F | RESPIRATION RATE: 14 BRPM | HEART RATE: 76 BPM | HEIGHT: 67 IN

## 2022-01-25 DIAGNOSIS — B07.0 PLANTAR WART OF RIGHT FOOT: Primary | ICD-10-CM

## 2022-01-25 DIAGNOSIS — B35.4 RINGWORM OF BODY: ICD-10-CM

## 2022-01-25 DIAGNOSIS — M79.671 RIGHT FOOT PAIN: ICD-10-CM

## 2022-01-25 PROCEDURE — 17110 DESTRUCTION B9 LES UP TO 14: CPT | Performed by: NURSE PRACTITIONER

## 2022-01-25 PROCEDURE — 4004F PT TOBACCO SCREEN RCVD TLK: CPT | Performed by: NURSE PRACTITIONER

## 2022-01-25 PROCEDURE — G8484 FLU IMMUNIZE NO ADMIN: HCPCS | Performed by: NURSE PRACTITIONER

## 2022-01-25 PROCEDURE — 99213 OFFICE O/P EST LOW 20 MIN: CPT | Performed by: NURSE PRACTITIONER

## 2022-01-25 PROCEDURE — G8417 CALC BMI ABV UP PARAM F/U: HCPCS | Performed by: NURSE PRACTITIONER

## 2022-01-25 PROCEDURE — G8427 DOCREV CUR MEDS BY ELIG CLIN: HCPCS | Performed by: NURSE PRACTITIONER

## 2022-01-25 RX ORDER — CLOTRIMAZOLE AND BETAMETHASONE DIPROPIONATE 10; .64 MG/G; MG/G
CREAM TOPICAL
Qty: 45 G | Refills: 1 | Status: SHIPPED | OUTPATIENT
Start: 2022-01-25 | End: 2022-07-06 | Stop reason: ALTCHOICE

## 2022-01-25 ASSESSMENT — PATIENT HEALTH QUESTIONNAIRE - PHQ9
SUM OF ALL RESPONSES TO PHQ QUESTIONS 1-9: 0
SUM OF ALL RESPONSES TO PHQ QUESTIONS 1-9: 0
2. FEELING DOWN, DEPRESSED OR HOPELESS: 0
SUM OF ALL RESPONSES TO PHQ9 QUESTIONS 1 & 2: 0
SUM OF ALL RESPONSES TO PHQ QUESTIONS 1-9: 0
1. LITTLE INTEREST OR PLEASURE IN DOING THINGS: 0
SUM OF ALL RESPONSES TO PHQ QUESTIONS 1-9: 0

## 2022-01-25 ASSESSMENT — ENCOUNTER SYMPTOMS
RESPIRATORY NEGATIVE: 1
COLOR CHANGE: 1

## 2022-01-25 NOTE — PROGRESS NOTES
Luly Phillips [place] [address] [place] [address]  Dept: [phone]  Dept Fax: [phone]  Loc: Carmen Choudhury is a 29 y.o. femalewho presents today for her medical conditions/complaints as noted below. Denisse R Lxeii c/o of Other (possible plantar wart, right foot)      HPI:      Pt presents with a several month history of right foot pain with a plantar wart on the ball of her right foot. Has had it since she had right foot surgery and wore a boot. She has been Dr. Corey Baca for this and had it frozen off 2 times and cut out once and has used topicals without any change. It has come back everytime. she had a lot of warts as a child. She states it hurts to walk on her right fooot. It shoots pain up her foot when she steps down. She has a red itchy circular rash on her right thigh, has not used anything on it. Does have a cat. Current Outpatient Medications   Medication Sig Dispense Refill    clotrimazole-betamethasone (LOTRISONE) 1-0.05 % cream Apply topically 2 times daily. 45 g 1    fluticasone (FLONASE) 50 MCG/ACT nasal spray 1 spray by Each Nostril route daily 32 g 1    ketorolac (TORADOL) 10 MG tablet Take 1 tablet by mouth every 6 hours as needed for Pain 20 tablet 0    loratadine (CLARITIN) 10 MG tablet Take 1 tablet by mouth daily 30 tablet 3    fluticasone (FLONASE) 50 MCG/ACT nasal spray 1 spray by Each Nostril route daily 2 Bottle 1    PARoxetine (PAXIL) 30 MG tablet take 1 tablet by mouth once daily 90 tablet 1    albuterol sulfate HFA (PROVENTIL HFA) 108 (90 Base) MCG/ACT inhaler Inhale 2 puffs into the lungs every 6 hours as needed for Wheezing 1 Inhaler 3    gabapentin (NEURONTIN) 300 MG capsule Take 300 mg by mouth daily.  Patient weaning herself off      Pseudoephedrine-guaiFENesin (MUCINEX D MAX STRENGTH) 120-1200 MG TB12 Take 1 tablet by mouth 2 times daily as needed (cough/congestion) 20 tablet 0    acetaminophen (TYLENOL) 325 MG tablet Take 650 mg by mouth every 6 hours as needed for Pain       No current facility-administered medications for this visit. Past Medical History:   Diagnosis Date    Abnormal Pap smear and cervical HPV (human papillomavirus)     Anxiety     Asthma     Depression     PTSD (post-traumatic stress disorder)     Smoker     Substance abuse (HCC)     history of    Tonsillitis     recurrent      Past Surgical History:   Procedure Laterality Date    DENTAL SURGERY      FOOT SURGERY Right 14    HYSTERECTOMY ABDOMINAL N/A 2019    ROBOTIC HYSTER W/ BS performed by Renny Cerna MD at [address] [place] [address]  2012     Family History   Problem Relation Age of Onset    Cancer Mother         ovarian in mother  , paternal grandmother- breast cancer    Alcohol Abuse Other          family     Social History     Tobacco Use    Smoking status: Current Every Day Smoker     Packs/day: 0.50     Years: 16.00     Pack years: 8.00     Types: Cigarettes     Last attempt to quit: 12/15/2015     Years since quittin.1    Smokeless tobacco: Never Used   Substance Use Topics    Alcohol use:  Yes     Alcohol/week: 1.0 standard drink     Types: 1 Glasses of wine per week     Comment: occasionally        Allergies   Allergen Reactions    Augmentin [Amoxicillin-Pot Clavulanate] Swelling    Bee Venom Hives    Nicotine Swelling     patch    Bee Pollen Swelling and Rash       Health Maintenance   Topic Date Due    COVID-19 Vaccine (1) Never done    Pneumococcal 0-64 years Vaccine (1 of 2 - PPSV23) Never done    Depression Monitoring  Never done    Flu vaccine (1) 2023 (Originally 2021)    DTaP/Tdap/Td vaccine (2 - Td or Tdap) 2026    Hepatitis C screen  Completed    HIV screen  Completed    Hepatitis A vaccine  Aged Out    Hepatitis B vaccine  Aged Out    Hib vaccine  Aged Out    Meningococcal (ACWY) vaccine  Aged Out    Varicella vaccine  Discontinued       Subjective:      Review of Systems   Respiratory: Negative. Cardiovascular: Negative. Musculoskeletal: Negative for arthralgias, joint swelling and myalgias. Skin: Positive for color change and rash. Objective:      /70   Pulse 76   Temp 98.3 °F (36.8 °C) (Oral)   Resp 14   Ht 5' 7\" (1.702 m)   Wt 171 lb 9.6 oz (77.8 kg)   LMP 08/18/2019   SpO2 98%   BMI 26.88 kg/m²      Physical Exam  Vitals and nursing note reviewed. Constitutional:       Appearance: She is not ill-appearing. Cardiovascular:      Rate and Rhythm: Normal rate and regular rhythm. Pulses: Normal pulses. Heart sounds: Normal heart sounds. No murmur heard. Musculoskeletal:        Feet:    Feet:      Comments: Wart Cryotherapy Note    Location: sole of right foot between 1st and 2nd digit and at base of right 5th digit  Size:3 cm at #1  1 cm #2    Written consent was obtain from patient or their guargian prior to procedure. After obtaining informed consent, the patient's identity, procedure, and site were verified during a pause prior to proceeding with the minor surgical procedure as per universal protocol recommendations. Wart was treated with light cryotherapy with freeze thaw freeze technique with 2-3 mm surround freeze of overlying keratin. Local care discussed. Side effects of treatment and precautions discussed. All questions answered. To follow up if worse or any new problems. Skin:     General: Skin is warm and dry. Capillary Refill: Capillary refill takes less than 2 seconds. Findings: Erythema present. Comments: Circular rash on upper right thigh, it is 2 cm in diameter, with central clearing, the outer [place] is erythemic. Neurological:      Mental Status: She is alert. Assessment/Plan:           1.  Plantar wart of right foot  Keep foot dry for 24 hours  If blister develops do not break open  May need second destruction  Pt in agreement with plan     2. Right foot pain      3. Ringworm of body    - clotrimazole-betamethasone (LOTRISONE) 1-0.05 % cream; Apply topically 2 times daily. Dispense: 45 g; Refill: 1      No follow-ups on file. Reccommended tobaccocessation options including pharmacologic methods, counseled great than 3 minutesduring this visit:  Yes[]  No  []       Patient given educational materials -see patient instructions. Discussed use, benefit, and side effects of prescribedmedications. All patient questions answered. Pt voiced understanding. Reviewedhealth maintenance. Instructed to continue current medications, diet and exercise. Patient agreed with treatment plan. Follow up as directed.        Electronicallysigned by NACHO Ayoub CNP on 1/25/2022 at 3:28 PM

## 2022-02-08 ENCOUNTER — OFFICE VISIT (OUTPATIENT)
Dept: FAMILY MEDICINE CLINIC | Age: 37
End: 2022-02-08
Payer: COMMERCIAL

## 2022-02-08 VITALS
OXYGEN SATURATION: 98 % | HEART RATE: 85 BPM | RESPIRATION RATE: 14 BRPM | HEIGHT: 67 IN | SYSTOLIC BLOOD PRESSURE: 116 MMHG | WEIGHT: 169 LBS | DIASTOLIC BLOOD PRESSURE: 80 MMHG | BODY MASS INDEX: 26.53 KG/M2 | TEMPERATURE: 97.9 F

## 2022-02-08 DIAGNOSIS — B07.0 PLANTAR WART OF RIGHT FOOT: ICD-10-CM

## 2022-02-08 DIAGNOSIS — M79.671 RIGHT FOOT PAIN: Primary | ICD-10-CM

## 2022-02-08 PROCEDURE — G8417 CALC BMI ABV UP PARAM F/U: HCPCS | Performed by: NURSE PRACTITIONER

## 2022-02-08 PROCEDURE — G8427 DOCREV CUR MEDS BY ELIG CLIN: HCPCS | Performed by: NURSE PRACTITIONER

## 2022-02-08 PROCEDURE — G8484 FLU IMMUNIZE NO ADMIN: HCPCS | Performed by: NURSE PRACTITIONER

## 2022-02-08 PROCEDURE — 4004F PT TOBACCO SCREEN RCVD TLK: CPT | Performed by: NURSE PRACTITIONER

## 2022-02-08 PROCEDURE — 99213 OFFICE O/P EST LOW 20 MIN: CPT | Performed by: NURSE PRACTITIONER

## 2022-02-08 RX ORDER — TRAMADOL HYDROCHLORIDE 50 MG/1
50 TABLET ORAL EVERY 8 HOURS PRN
Qty: 21 TABLET | Refills: 0 | Status: SHIPPED | OUTPATIENT
Start: 2022-02-08 | End: 2022-02-15

## 2022-02-08 ASSESSMENT — ENCOUNTER SYMPTOMS
COLOR CHANGE: 0
RESPIRATORY NEGATIVE: 1

## 2022-02-08 NOTE — PROGRESS NOTES
[facility]  [address] [place] [address]  Dept: [phone]  Dept Fax: [phone]  Loc: Hilton Severin is a 29 y.o. femalewho presents today for her medical conditions/complaints as noted below. Denisse Shultz c/o of Other (spot on foot is more sensitive than before. )      HPI:      Pt presents with a several month history of right foot pain with a plantar wart on the ball of her right foot. Has had it since she had right foot surgery and wore a boot. She has been Dr. Terrence Ren for this and had it frozen off 2 times and cut out once and has used topicals without any change. It has come back everytime. she had a lot of warts as a child. She states it hurts to walk on her right fooot. It shoots pain up her foot when she steps down. She had freezing 2 weeks ago but states nothing has changed, thinks it may hurt worse. It hurts to walk on it and hurts at rest. The ball  For right foot aches is sensitive to touch. No erythema, is slightly swollen. She has a red itchy circular rash on her right thigh, has not used anything on it. Does have a cat. Will refer to p    Other  Pertinent negatives include no arthralgias, joint swelling or myalgias. Current Outpatient Medications   Medication Sig Dispense Refill    traMADol (ULTRAM) 50 MG tablet Take 1 tablet by mouth every 8 hours as needed for Pain for up to 7 days. Intended supply: 5 days. Take lowest dose possible to manage pain 21 tablet 0    clotrimazole-betamethasone (LOTRISONE) 1-0.05 % cream Apply topically 2 times daily.  45 g 1    fluticasone (FLONASE) 50 MCG/ACT nasal spray 1 spray by Each Nostril route daily 32 g 1    ketorolac (TORADOL) 10 MG tablet Take 1 tablet by mouth every 6 hours as needed for Pain 20 tablet 0    loratadine (CLARITIN) 10 MG tablet Take 1 tablet by mouth daily 30 tablet 3    fluticasone (FLONASE) 50 MCG/ACT nasal spray 1 spray by Each Nostril route daily 2 Bottle 1    PARoxetine (PAXIL) 30 MG tablet take 1 tablet by mouth once daily 90 tablet 1    albuterol sulfate HFA (PROVENTIL HFA) 108 (90 Base) MCG/ACT inhaler Inhale 2 puffs into the lungs every 6 hours as needed for Wheezing 1 Inhaler 3    gabapentin (NEURONTIN) 300 MG capsule Take 300 mg by mouth daily. Patient weaning herself off      Pseudoephedrine-guaiFENesin (MUCINEX D MAX STRENGTH) 120-1200 MG TB12 Take 1 tablet by mouth 2 times daily as needed (cough/congestion) 20 tablet 0    acetaminophen (TYLENOL) 325 MG tablet Take 650 mg by mouth every 6 hours as needed for Pain       No current facility-administered medications for this visit. Past Medical History:   Diagnosis Date    Abnormal Pap smear and cervical HPV (human papillomavirus)     Anxiety     Asthma     Depression     PTSD (post-traumatic stress disorder)     Smoker     Substance abuse (HCC)     history of    Tonsillitis     recurrent      Past Surgical History:   Procedure Laterality Date    DENTAL SURGERY      FOOT SURGERY Right 14    HYSTERECTOMY ABDOMINAL N/A 2019    ROBOTIC HYSTER W/ BS performed by Jean-Claude Dixon MD at [address] [place] [address]  2012     Family History   Problem Relation Age of Onset    Cancer Mother         ovarian in mother  , paternal grandmother- breast cancer    Alcohol Abuse Other          family     Social History     Tobacco Use    Smoking status: Current Every Day Smoker     Packs/day: 0.50     Years: 16.00     Pack years: 8.00     Types: Cigarettes     Last attempt to quit: 12/15/2015     Years since quittin.1    Smokeless tobacco: Never Used   Substance Use Topics    Alcohol use:  Yes     Alcohol/week: 1.0 standard drink     Types: 1 Glasses of wine per week     Comment: occasionally        Allergies   Allergen Reactions    Augmentin [Amoxicillin-Pot Clavulanate] Swelling    Bee Venom Hives    Nicotine Swelling     patch    Bee Pollen Swelling and Rash       Health Maintenance   Topic Date Due    COVID-19 Vaccine (1) Never done    Pneumococcal 0-64 years Vaccine (1 of 2 - PPSV23) Never done    Flu vaccine (1) 01/25/2023 (Originally 9/1/2021)    Depression Monitoring  01/25/2023    DTaP/Tdap/Td vaccine (2 - Td or Tdap) 04/28/2026    Hepatitis C screen  Completed    HIV screen  Completed    Hepatitis A vaccine  Aged Out    Hepatitis B vaccine  Aged Out    Hib vaccine  Aged Out    Meningococcal (ACWY) vaccine  Aged Out    Varicella vaccine  Discontinued       Subjective:      Review of Systems   Respiratory: Negative. Cardiovascular: Negative. Musculoskeletal: Negative for arthralgias, joint swelling and myalgias. Skin: Negative for color change. Psychiatric/Behavioral: Negative for self-injury, sleep disturbance and suicidal ideas. Objective:      /80   Pulse 85   Temp 97.9 °F (36.6 °C) (Oral)   Resp 14   Ht 5' 7\" (1.702 m)   Wt 169 lb (76.7 kg)   LMP 08/18/2019   SpO2 98%   BMI 26.47 kg/m²      ball of right foot between 1st and 2nd digit with a quarter sized firm palpable flesh colored area, no erythema. BLE pedal pulses equal and strong. No edema of right foot      Assessment/Plan:          Encounter Diagnoses   Name Primary?  Right foot pain Yes    Plantar wart of right foot      Refer to Podiatry for further management     Return if symptoms worsen or fail to improve. Reccommended tobaccocessation options including pharmacologic methods, counseled great than 3 minutesduring this visit:  Yes[]  No  []       Patient given educational materials -see patient instructions. Discussed use, benefit, and side effects of prescribedmedications. All patient questions answered. Pt voiced understanding. Reviewedhealth maintenance. Instructed to continue current medications, diet and exercise. Patient agreed with treatment plan. Follow up as directed.        Electronicallysigned by Jennifer Wellington APRN - CNP on 2/8/2022 at 3:56 PM

## 2022-03-17 ENCOUNTER — HOSPITAL ENCOUNTER (EMERGENCY)
Age: 37
Discharge: HOME | End: 2022-03-17
Payer: COMMERCIAL

## 2022-03-17 VITALS
BODY MASS INDEX: 25.58 KG/M2 | RESPIRATION RATE: 16 BRPM | TEMPERATURE: 97.2 F | HEART RATE: 102 BPM | WEIGHT: 163 LBS | DIASTOLIC BLOOD PRESSURE: 74 MMHG | HEIGHT: 67 IN | SYSTOLIC BLOOD PRESSURE: 146 MMHG | OXYGEN SATURATION: 97 %

## 2022-03-17 DIAGNOSIS — J06.9 VIRAL URI WITH COUGH: Primary | ICD-10-CM

## 2022-03-17 LAB
FLU A ANTIGEN: NEGATIVE
FLU B ANTIGEN: NEGATIVE

## 2022-03-17 PROCEDURE — 87804 INFLUENZA ASSAY W/OPTIC: CPT

## 2022-03-17 PROCEDURE — 99213 OFFICE O/P EST LOW 20 MIN: CPT

## 2022-03-17 PROCEDURE — 99213 OFFICE O/P EST LOW 20 MIN: CPT | Performed by: NURSE PRACTITIONER

## 2022-03-17 ASSESSMENT — ENCOUNTER SYMPTOMS
CHEST TIGHTNESS: 0
TROUBLE SWALLOWING: 0
DIARRHEA: 0
VOMITING: 0
EYE REDNESS: 0
COUGH: 1
WHEEZING: 0
SORE THROAT: 1
NAUSEA: 0
RHINORRHEA: 1
SHORTNESS OF BREATH: 0
EYE DISCHARGE: 0

## 2022-03-17 NOTE — Clinical Note
Veronique Andujar was seen and treated in our emergency department on 3/17/2022. She may return to work on 03/19/2022. If you have any questions or concerns, please don't hesitate to call.       Jay Jay Hernandez, APRN - CNP

## 2022-03-17 NOTE — Clinical Note
Leyla Ely was seen and treated in our emergency department on 3/17/2022. She may return to work on 03/20/2022. If you have any questions or concerns, please don't hesitate to call.       Minesh Schafer, APRN - CNP

## 2022-03-17 NOTE — ED PROVIDER NOTES
[phone]       Chief Complaint   Patient presents with    Nasal Congestion    Cough    Pharyngitis       Nurses Notes reviewed and I agree except as noted in the HPI. HISTORY OF PRESENT ILLNESS   Bud Miller is a 29 y.o. female who presents for evaluation of cough and sore throat. Onset between 2 and 3 days ago, unchanged. Cough is intermittent, productive at times. Associated sinus congestion and sore throat. Sore throat with coughing, swallowing. No fever, trouble swallowing. No wheezing, chest pain, shortness of breath. Medical history of asthma. No improvement with over-the-counter treatment. REVIEW OF SYSTEMS     Review of Systems   Constitutional: Negative for chills, diaphoresis, fatigue and fever. HENT: Positive for congestion, postnasal drip, rhinorrhea and sore throat. Negative for ear pain and trouble swallowing. Eyes: Negative for discharge and redness. Respiratory: Positive for cough. Negative for chest tightness, shortness of breath and wheezing. Cardiovascular: Negative for chest pain. Gastrointestinal: Negative for diarrhea, nausea and vomiting. Genitourinary: Negative for decreased urine volume. Musculoskeletal: Negative for neck pain and neck stiffness. Skin: Negative for rash. Neurological: Negative for headaches. Hematological: Negative for adenopathy. Psychiatric/Behavioral: Negative for sleep disturbance. PAST MEDICAL HISTORY         Diagnosis Date    Abnormal Pap smear and cervical HPV (human papillomavirus)     Anxiety     Asthma     Depression     PTSD (post-traumatic stress disorder)     Smoker     Substance abuse ([facility] [address]     history of    Tonsillitis     recurrent       SURGICAL HISTORY     Patient  has a past surgical history that includes Dental surgery; Tubal ligation (1/2012);  Foot surgery (Right, 08/26/14); and HYSTERECTOMY ABDOMINAL (N/A, 8/5/2019). CURRENT MEDICATIONS       Discharge Medication List as of 3/17/2022  3:53 PM      CONTINUE these medications which have NOT CHANGED    Details   clotrimazole-betamethasone (LOTRISONE) 1-0.05 % cream Apply topically 2 times daily. , Disp-45 g, R-1, Normal      fluticasone (FLONASE) 50 MCG/ACT nasal spray 1 spray by Each Nostril route daily, Disp-32 g, R-1Normal      ketorolac (TORADOL) 10 MG tablet Take 1 tablet by mouth every 6 hours as needed for Pain, Disp-20 tablet, R-0Normal      albuterol sulfate HFA (PROVENTIL HFA) 108 (90 Base) MCG/ACT inhaler Inhale 2 puffs into the lungs every 6 hours as needed for Wheezing, Disp-1 Inhaler,R-3Normal      acetaminophen (TYLENOL) 325 MG tablet Take 650 mg by mouth every 6 hours as needed for PainHistorical Med             ALLERGIES     Patient is is allergic to augmentin [amoxicillin-pot clavulanate], bee venom, nicotine, and bee pollen. FAMILY HISTORY     Patient'sfamily history includes Alcohol Abuse in an other family member; Cancer in her mother. SOCIAL HISTORY     Patient  reports that she has been smoking cigarettes. She has a 8.00 pack-year smoking history. She has never used smokeless tobacco. She reports current alcohol use of about 1.0 standard drink of alcohol per week. She reports that she does not use drugs. PHYSICAL EXAM     ED TRIAGE VITALS  BP: (!) 146/74, Temp: 97.2 °F (36.2 °C), Pulse: 102, Resp: 16, SpO2: 97 %  Physical Exam  Vitals and nursing note reviewed. Constitutional:       General: She is not in acute distress. Appearance: Normal appearance. She is well-developed. She is not ill-appearing, toxic-appearing or diaphoretic. HENT:      Head: Normocephalic and atraumatic. Right Ear: Hearing, tympanic membrane, ear canal and external ear normal. No mastoid tenderness. No hemotympanum. Tympanic membrane is not perforated, erythematous or bulging.       Left Ear: Hearing, tympanic membrane, ear canal and external ear normal. No mastoid tenderness. No hemotympanum. Tympanic membrane is not perforated, erythematous or bulging. Nose: Congestion present. Mouth/Throat:      Mouth: Mucous membranes are moist.      Pharynx: Oropharynx is clear. Uvula midline. Tonsils: No tonsillar abscesses. Eyes:      General: No scleral icterus. Conjunctiva/sclera: Conjunctivae normal.      Right eye: Right conjunctiva is not injected. No hemorrhage. Left eye: Left conjunctiva is not injected. No hemorrhage. Neck:      Thyroid: No thyromegaly. Trachea: Trachea normal.   Cardiovascular:      Rate and Rhythm: Normal rate and regular rhythm. No extrasystoles are present. Chest Wall: PMI is not displaced. Heart sounds: Normal heart sounds. No murmur heard. No friction rub. No gallop. Pulmonary:      Effort: Pulmonary effort is normal. No respiratory distress. Breath sounds: Normal breath sounds. Chest:   Breasts:      Right: No supraclavicular adenopathy. Left: No supraclavicular adenopathy. Musculoskeletal:      Cervical back: Normal range of motion and neck supple. Lymphadenopathy:      Head:      Right side of head: No submental, submandibular, tonsillar or occipital adenopathy. Left side of head: No submental, submandibular, tonsillar or occipital adenopathy. Cervical: No cervical adenopathy. Upper Body:      Right upper body: No supraclavicular adenopathy. Left upper body: No supraclavicular adenopathy. Skin:     General: Skin is warm and dry. Capillary Refill: Capillary refill takes less than 2 seconds. Coloration: Skin is not pale. Findings: No rash. Comments: Skin warm and dry to touch, no rashes noted on exposed surfaces. Neurological:      Mental Status: She is alert and oriented to person, place, and time. She is not disoriented. Psychiatric:         Mood and Affect: Mood normal.         Behavior: Behavior is cooperative. DIAGNOSTIC RESULTS   Labs:   Results for orders placed or performed during the hospital encounter of 03/17/22   Rapid influenza A/B antigens   Result Value Ref Range    Flu A Antigen Negative NEGATIVE    Flu B Antigen Negative NEGATIVE       IMAGING:  No orders to display     URGENT CARE COURSE:     Vitals:    03/17/22 1522   BP: (!) 146/74   Pulse: 102   Resp: 16   Temp: 97.2 °F (36.2 °C)   TempSrc: Temporal   SpO2: 97%   Weight: 163 lb (73.9 kg)   Height: 5' 7\" (1.702 m)       Medications - No data to display  PROCEDURES:  None  FINALIMPRESSION      1. Viral URI with cough        DISPOSITION/PLAN   DISPOSITION    Nontoxic, no distress. Influenza negative. Exam consistent with viral URI with cough. Continue over-the-counter treatment. Increase fluids, rest.  Off work for the next 1 to 2 days. If symptoms worsen return or go to ER. PATIENT REFERRED TO:  NACHO García CNP  [address]  [phone]      Follow-up as needed. Continue current treatment. Increase fluids, rest.  If symptoms worsen return or go to ER.     DISCHARGE MEDICATIONS:  Discharge Medication List as of 3/17/2022  3:53 PM        Discharge Medication List as of 3/17/2022  3:53 PM          [address], APRN - CNP  03/17/22 7946

## 2022-03-23 ENCOUNTER — OFFICE VISIT (OUTPATIENT)
Dept: FAMILY MEDICINE CLINIC | Age: 37
End: 2022-03-23
Payer: COMMERCIAL

## 2022-03-23 VITALS
BODY MASS INDEX: 26.84 KG/M2 | TEMPERATURE: 98.1 F | HEIGHT: 67 IN | DIASTOLIC BLOOD PRESSURE: 70 MMHG | RESPIRATION RATE: 12 BRPM | HEART RATE: 72 BPM | SYSTOLIC BLOOD PRESSURE: 122 MMHG | WEIGHT: 171 LBS

## 2022-03-23 DIAGNOSIS — L42 PITYRIASIS ROSEA: Primary | ICD-10-CM

## 2022-03-23 DIAGNOSIS — J40 BRONCHITIS: ICD-10-CM

## 2022-03-23 DIAGNOSIS — L29.9 GENERALIZED PRURITUS: ICD-10-CM

## 2022-03-23 PROCEDURE — G8417 CALC BMI ABV UP PARAM F/U: HCPCS | Performed by: NURSE PRACTITIONER

## 2022-03-23 PROCEDURE — G8484 FLU IMMUNIZE NO ADMIN: HCPCS | Performed by: NURSE PRACTITIONER

## 2022-03-23 PROCEDURE — G8427 DOCREV CUR MEDS BY ELIG CLIN: HCPCS | Performed by: NURSE PRACTITIONER

## 2022-03-23 PROCEDURE — 4004F PT TOBACCO SCREEN RCVD TLK: CPT | Performed by: NURSE PRACTITIONER

## 2022-03-23 PROCEDURE — 99213 OFFICE O/P EST LOW 20 MIN: CPT | Performed by: NURSE PRACTITIONER

## 2022-03-23 RX ORDER — HYDROXYZINE HYDROCHLORIDE 25 MG/1
25 TABLET, FILM COATED ORAL EVERY 8 HOURS PRN
Qty: 30 TABLET | Refills: 0 | Status: SHIPPED | OUTPATIENT
Start: 2022-03-23 | End: 2022-04-02

## 2022-03-23 RX ORDER — PREDNISONE 20 MG/1
TABLET ORAL
Qty: 18 TABLET | Refills: 0 | Status: SHIPPED | OUTPATIENT
Start: 2022-03-23 | End: 2022-07-06 | Stop reason: ALTCHOICE

## 2022-03-23 RX ORDER — GUAIFENESIN 600 MG/1
600 TABLET, EXTENDED RELEASE ORAL 2 TIMES DAILY
Qty: 30 TABLET | Refills: 0 | Status: SHIPPED | OUTPATIENT
Start: 2022-03-23 | End: 2022-04-07

## 2022-03-23 RX ORDER — AZITHROMYCIN 250 MG/1
250 TABLET, FILM COATED ORAL SEE ADMIN INSTRUCTIONS
Qty: 6 TABLET | Refills: 0 | Status: SHIPPED | OUTPATIENT
Start: 2022-03-23 | End: 2022-03-28

## 2022-03-23 NOTE — PROGRESS NOTES
Nabeel Hinton  is a 29 y.o. y/o female that presents for Rash (chest  & back  started  friday- went to   tested negative  flu ), Cough (x 7 days ), and Nasal Congestion (x 7ays )      Rash    HPI:    Length of time Sx have been present - 1 week(s). Rash has gotten worse since initially starting  Affected areas - chest, back, abdomen and upper arms. Inciting events or exposures prior to rash starting? Yes - a viral URI  Pruritic? Yes  Erythematous? Yes  Weeping or drainage? No  History of Urticaria? No  Fever? No  Painful? No    URI Symptoms    HPI:      Symptoms have been present for 1 week(s). Symptoms are unchanged since they initially started. Fever? No  Runny nose or congestion? Yes   Cough? Yes  Sore throat? No  Headache, fatigue, joint pains, muscle aches? No  Shortness of breath/Wheezing? No  Nausea/Vomiting/Diarrhea? No  Double Sickening? No  Sick contacts? No    Patient has tried dayquil and nyquil with out improvement. OBJECTIVE:  /70   Pulse 72   Temp 98.1 °F (36.7 °C) (Oral)   Resp 12   Ht 5' 7\" (1.702 m)   Wt 171 lb (77.6 kg)   LMP 08/18/2019   BMI 26.78 kg/m²   She appears well; non-toxic and in no apparent distress. Extremities - no pedal edema noted, intact peripheral pulses  Skin - widespread rash with herald patches of chest, abdomen, upper arms and back       ASSESSMENT & PLAN  Jessie Chang was seen today for rash, cough and nasal congestion. Diagnoses and all orders for this visit:    Pityriasis rosea  -     predniSONE (DELTASONE) 20 MG tablet; 1 tab po tid for 3 days 1 tab po bid for 3 days 1 tab po once day for 3 days    Generalized pruritus    Bronchitis  -     azithromycin (ZITHROMAX) 250 MG tablet; Take 1 tablet by mouth See Admin Instructions for 5 days 500mg on day 1 followed by 250mg on days 2 - 5  Increase fluids  Tylenol or motrin for fever or pain   Other orders  -     hydrOXYzine (ATARAX) 25 MG tablet;  Take 1 tablet by mouth every 8 hours as needed for Itching  -     guaiFENesin (MUCINEX) 600 MG extended release tablet; Take 1 tablet by mouth 2 times daily for 15 days    Pt in agreement with plan    Return if symptoms worsen or fail to improve.      -Start above treatments  -Patient advised to contact our office immediately if symptoms worsen or persist  -Patient counseled on conservative care including skin care and OTC meds    All patient questions answered. Patient voiced understanding.

## 2022-03-23 NOTE — PATIENT INSTRUCTIONS
Patient Education        Pityriasis Rosea: Care Instructions  Your Care Instructions     Pityriasis rosea (say \"pit-uh-RY-uh-alyson YOVANA-zee-uh\") is a common skin rash. It usually starts as one scaly, reddish-pink spot on your stomach or back. Days or weeks later, more spots appear. The rash may itch, but it will not spread to other people. No one knows what causes pityriasis rosea. Some doctors believe it is a reaction to a virus. Pityriasis rosea is most common in children and young adults. It lasts 1 to 3 months and then goes away on its own. Medicine can help relieve any itching. Follow-up care is a key part of your treatment and safety. Be sure to make and go to all appointments, and call your doctor if you are having problems. It's also a good idea to know your test results and keep a list of the medicines you take. How can you care for yourself at home? · Use your medicine exactly as prescribed. Call your doctor if you have any problems with your medicine. · Expose your skin to small amounts of sunlight, but avoid sunburn. Sunlight can lessen the rash. · Use a mild soap, such as Dove or Cetaphil, when you wash your skin. · Add a handful of oatmeal (ground to a powder) to your bath. Or you can try an oatmeal bath product, such as Aveeno. Keep the water warm or lukewarm. A hot bath or shower may make the rash more visible and itchy. · Use an over-the-counter 1% hydrocortisone cream for small itchy areas. When should you call for help? Call your doctor now or seek immediate medical care if:    · You have signs of infection such as:  ? Pain, warmth, or swelling near the rash. ? Red streaks near the rash. ? Pus coming from the rash. ? A fever. Watch closely for changes in your health, and be sure to contact your doctor if:    · You see the rash on the palms of your hands or the soles of your feet.     · You do not get better as expected. Where can you learn more?   Go to https://chpepiceweb.[email or web address] org and sign in to your [employer] account. Enter S327 in the [facility] box to learn more about \"Pityriasis Rosea: Care Instructions. \"     If you do not have an account, please click on the \"Sign Up Now\" link. Current as of: March 3, 2021               Content Version: 13.1  © 3946-4065 [facility]. Care instructions adapted under license by [facility] ([facility]). If you have questions about a medical condition or this instruction, always ask your healthcare professional. [place] [address] any warranty or liability for your use of this information.

## 2022-03-28 ENCOUNTER — E-VISIT (OUTPATIENT)
Dept: FAMILY MEDICINE CLINIC | Age: 37
End: 2022-03-28
Payer: COMMERCIAL

## 2022-03-28 DIAGNOSIS — R05.9 COUGH: ICD-10-CM

## 2022-03-28 DIAGNOSIS — J40 BRONCHITIS: Primary | ICD-10-CM

## 2022-03-28 DIAGNOSIS — J30.89 NON-SEASONAL ALLERGIC RHINITIS DUE TO OTHER ALLERGIC TRIGGER: ICD-10-CM

## 2022-03-28 PROCEDURE — 99421 OL DIG E/M SVC 5-10 MIN: CPT | Performed by: NURSE PRACTITIONER

## 2022-03-28 RX ORDER — BENZONATATE 100 MG/1
100-200 CAPSULE ORAL 3 TIMES DAILY PRN
Qty: 60 CAPSULE | Refills: 0 | Status: SHIPPED | OUTPATIENT
Start: 2022-03-28 | End: 2022-04-04

## 2022-03-28 RX ORDER — LEVOCETIRIZINE DIHYDROCHLORIDE 5 MG/1
5 TABLET, FILM COATED ORAL NIGHTLY
Qty: 30 TABLET | Refills: 0 | Status: SHIPPED | OUTPATIENT
Start: 2022-03-28 | End: 2022-07-06 | Stop reason: ALTCHOICE

## 2022-03-28 ASSESSMENT — LIFESTYLE VARIABLES
SMOKING_STATUS: YES
SMOKING_YEARS: 17

## 2022-06-09 ENCOUNTER — OFFICE VISIT (OUTPATIENT)
Dept: FAMILY MEDICINE CLINIC | Age: 37
End: 2022-06-09
Payer: COMMERCIAL

## 2022-06-09 VITALS
HEART RATE: 85 BPM | OXYGEN SATURATION: 100 % | TEMPERATURE: 98.2 F | DIASTOLIC BLOOD PRESSURE: 84 MMHG | RESPIRATION RATE: 14 BRPM | SYSTOLIC BLOOD PRESSURE: 138 MMHG | BODY MASS INDEX: 25.77 KG/M2 | WEIGHT: 164.2 LBS | HEIGHT: 67 IN

## 2022-06-09 DIAGNOSIS — R82.998 LEUKOCYTES IN URINE: ICD-10-CM

## 2022-06-09 DIAGNOSIS — R30.0 DYSURIA: ICD-10-CM

## 2022-06-09 DIAGNOSIS — N30.01 ACUTE CYSTITIS WITH HEMATURIA: Primary | ICD-10-CM

## 2022-06-09 LAB
BILIRUBIN URINE: NEGATIVE
BLOOD URINE, POC: ABNORMAL
CHARACTER, URINE: CLEAR
COLOR, URINE: YELLOW
GLUCOSE URINE: NEGATIVE MG/DL
KETONES, URINE: NEGATIVE
LEUKOCYTE CLUMPS, URINE: ABNORMAL
NITRITE, URINE: NEGATIVE
PH, URINE: 7 (ref 5–9)
PROTEIN, URINE: NEGATIVE MG/DL
SPECIFIC GRAVITY, URINE: 1.01 (ref 1–1.03)
UROBILINOGEN, URINE: 0.2 EU/DL (ref 0–1)

## 2022-06-09 PROCEDURE — G8427 DOCREV CUR MEDS BY ELIG CLIN: HCPCS | Performed by: NURSE PRACTITIONER

## 2022-06-09 PROCEDURE — 99213 OFFICE O/P EST LOW 20 MIN: CPT | Performed by: NURSE PRACTITIONER

## 2022-06-09 PROCEDURE — 81003 URINALYSIS AUTO W/O SCOPE: CPT | Performed by: NURSE PRACTITIONER

## 2022-06-09 PROCEDURE — G8417 CALC BMI ABV UP PARAM F/U: HCPCS | Performed by: NURSE PRACTITIONER

## 2022-06-09 PROCEDURE — 4004F PT TOBACCO SCREEN RCVD TLK: CPT | Performed by: NURSE PRACTITIONER

## 2022-06-09 RX ORDER — PHENAZOPYRIDINE HYDROCHLORIDE 100 MG/1
100 TABLET, FILM COATED ORAL 3 TIMES DAILY PRN
Qty: 6 TABLET | Refills: 0 | Status: SHIPPED | OUTPATIENT
Start: 2022-06-09 | End: 2022-06-11

## 2022-06-09 RX ORDER — NITROFURANTOIN 25; 75 MG/1; MG/1
100 CAPSULE ORAL 2 TIMES DAILY
Qty: 20 CAPSULE | Refills: 0 | Status: SHIPPED | OUTPATIENT
Start: 2022-06-09 | End: 2022-06-19

## 2022-06-09 NOTE — PATIENT INSTRUCTIONS
Patient Education        Painful Urination (Dysuria): Care Instructions  Your Care Instructions  Burning pain with urination (dysuria) is a common symptom of a urinary tract infection or other urinary problems. The bladder may become inflamed. This can cause pain when the bladder fills and empties. You may also feel pain if the tube that carries urine from the bladder to the outside of the body (urethra)gets irritated or infected. Sexually transmitted infections (STIs) also may cause pain when you urinate. Sometimes the pain can be caused by things other than an infection. The urethra can be irritated by soaps, perfumes, or foreign objects in the urethra. Elise Reach can cause pain when they pass through the urethra. The cause may be hard to find. You may need tests. Treatment for painfulurination depends on the cause. Follow-up care is a key part of your treatment and safety. Be sure to make and go to all appointments, and call your doctor if you are having problems. It's also a good idea to know your test results and keep alist of the medicines you take. How can you care for yourself at home?  Drink extra water for the next day or two. This will help make the urine less concentrated. (If you have kidney, heart, or liver disease and have to limit fluids, talk with your doctor before you increase the amount of fluids you drink.)   Avoid drinks that are carbonated or have caffeine. They can irritate the bladder.  Urinate often. Try to empty your bladder each time. For women:   Urinate right after you have sex.  After going to the bathroom, wipe from front to back.  Avoid douches, bubble baths, and feminine hygiene sprays. And avoid other feminine hygiene products that have deodorants. When should you call for help? Call your doctor now or seek immediate medical care if:     You have new symptoms, such as fever, nausea, or vomiting.      You have new or worse symptoms of a urinary problem.  For example:  ? You have blood or pus in your urine. ? You have chills or body aches. ? It hurts worse to urinate. ? You have groin or belly pain. ? You have pain in your back just below your rib cage (the flank area). Watch closely for changes in your health, and be sure to contact your doctor ifyou have any problems. Where can you learn more? Go to https://Inzen Studiopeumaeweb.[email or web address] org and sign in to your Kii account. Enter F541 in the "[employer] box to learn more about \"Painful Urination (Dysuria): Care Instructions. \"     If you do not have an account, please click on the \"Sign Up Now\" link. Current as of: October 18, 2021               Content Version: 13.2  © 2006-2022 [employer]. Care instructions adapted under license by [facility] ([facility]). If you have questions about a medical condition or this instruction, always ask your healthcare professional. [place] [address] any warranty or liability for your use of this information. Patient Education        Urinary Tract Infection (UTI) in Women: Care Instructions  Overview     A urinary tract infection, or UTI, is a general term for an infection anywhere between the kidneys and the urethra (where urine comes out). Most UTIs arebladder infections. They often cause pain or burning when you urinate. UTIs are caused by bacteria and can be cured with antibiotics. Be sure tocomplete your treatment so that the infection does not get worse. Follow-up care is a key part of your treatment and safety. Be sure to make and go to all appointments, and call your doctor if you are having problems. It's also a good idea to know your test results and keep alist of the medicines you take. How can you care for yourself at home?  Take your antibiotics as directed. Do not stop taking them just because you feel better. You need to take the full course of antibiotics.    Drink extra water and other fluids for the next day or two. This will help make the urine less concentrated and help wash out the bacteria that are causing the infection. (If you have kidney, heart, or liver disease and have to limit fluids, talk with your doctor before you increase the amount of fluids you drink.)   Avoid drinks that are carbonated or have caffeine. They can irritate the bladder.  Urinate often. Try to empty your bladder each time.  To relieve pain, take a hot bath or lay a heating pad set on low over your lower belly or genital area. Never go to sleep with a heating pad in place. To prevent UTIs   Drink plenty of water each day. This helps you urinate often, which clears bacteria from your system. (If you have kidney, heart, or liver disease and have to limit fluids, talk with your doctor before you increase the amount of fluids you drink.)   Urinate when you need to.  If you are sexually active, urinate right after you have sex.  Change sanitary pads often.  Avoid douches, bubble baths, feminine hygiene sprays, and other feminine hygiene products that have deodorants.  After going to the bathroom, wipe from front to back. When should you call for help? Call your doctor now or seek immediate medical care if:     Symptoms such as fever, chills, nausea, or vomiting get worse or appear for the first time.      You have new pain in your back just below your rib cage. This is called flank pain.      There is new blood or pus in your urine.      You have any problems with your antibiotic medicine. Watch closely for changes in your health, and be sure to contact your doctor if:     You are not getting better after taking an antibiotic for 2 days.      Your symptoms go away but then come back. Where can you learn more? Go to https://alyseeb.health-partners. org and sign in to your [employer] account. Enter D672 in the [facility] box to learn more about \"Urinary Tract Infection (UTI) in Women: Care Instructions. \" If you do not have an account, please click on the \"Sign Up Now\" link. Current as of: October 18, 2021               Content Version: 13.2  © 4207-5264 [employer]. Care instructions adapted under license by [facility] ([facility]). If you have questions about a medical condition or this instruction, always ask your healthcare professional. [address] any warranty or liability for your use of this information.

## 2022-06-09 NOTE — LETTER
[address] [place] [address]  [address] [place] [address]. [facility] [address]  Phone: [phone]  Fax: [phone]    NACHO Padilla CNP        June 9, 2022     Patient: Trina Navas   YOB: 1985   Date of Visit: 6/9/2022       To Whom It May Concern: It is my medical opinion that Raad Martinez was seen in the office today , Thursday June 9th, 2022. .    If you have any questions or concerns, please don't hesitate to call.     Sincerely,        NACHO Padilla CNP

## 2022-06-09 NOTE — PROGRESS NOTES
SUBJECTIVE:  Tea Fabian is a 40 y.o. female for   Chief Complaint   Patient presents with    Other     lower abdominal pain, burning. x2 days       HPI:    Symptoms present for 3 days. Symptoms are unchanged since they initially started. Dysuria? Yes  Hematuria? No  Increased urinary frequency? Yes  Abdominal discomfort? Yes  CVA pain? No  Hx of UTIs? Yes    Patient Active Problem List   Diagnosis    Anxiety    Depression    Smoker    Abnormal Pap smear and cervical HPV (human papillomavirus)    S/P hysterectomy    Major depressive disorder, recurrent episode, moderate with anxious distress ([facility])           OBJECTIVE:  /84   Pulse 85   Temp 98.2 °F (36.8 °C) (Oral)   Resp 14   Ht 5' 7\" (1.702 m)   Wt 164 lb 3.2 oz (74.5 kg)   LMP 08/18/2019   SpO2 100%   BMI 25.72 kg/m²   She appears well; non-toxic and in no apparent distress. Physical Examination: Chest - clear to auscultation, no wheezes, rales or rhonchi, symmetric air entry  Abdomen - soft, nontender, nondistended, no masses or organomegaly, no CVA tenderness  Extremities - peripheral pulses normal, no pedal edema, no clubbing or cyanosis  Psych -  Affect appropriate. Thought process is normal without evidence of depression or psychosis. Good insight and appropriae interaction. Cognition and memory appear to be intact. Jorge Townsend was seen today for other. Diagnoses and all orders for this visit:    Acute cystitis with hematuria  -     POCT Urinalysis No Micro (Auto)large leukocytes, moderate blood  macrobid ordered  -     phenazopyridine (PYRIDIUM) 100 MG tablet; Take 1 tablet by mouth 3 times daily as needed for Pain    Leukocytes in urine  -     POCT Urinalysis No Micro (Auto)  -     Culture, Urine    Dysuria  -     Culture, Urine  -     phenazopyridine (PYRIDIUM) 100 MG tablet;  Take 1 tablet by mouth 3 times daily as needed for Pain    Other orders  -     POCT Urinalysis No Micro (Auto)  - nitrofurantoin, macrocrystal-monohydrate, (MACROBID) 100 MG capsule; Take 1 capsule by mouth 2 times daily for 10 days  Pt in agreement with plan  Work note written       Return if symptoms worsen or fail to improve.      -Start above treatments  -Urine culture was sent today  -Patient advised to contact our office immediately if symptoms worsen or persist  -Patient counseled on conservative care including fluids, rest and OTC meds      All patient questions answered. Patient voiced understanding.

## 2022-06-10 LAB
ORGANISM: ABNORMAL
URINE CULTURE, ROUTINE: ABNORMAL

## 2022-06-13 ENCOUNTER — TELEPHONE (OUTPATIENT)
Dept: FAMILY MEDICINE CLINIC | Age: 37
End: 2022-06-13

## 2022-07-06 ENCOUNTER — OFFICE VISIT (OUTPATIENT)
Dept: FAMILY MEDICINE CLINIC | Age: 37
End: 2022-07-06
Payer: COMMERCIAL

## 2022-07-06 VITALS
BODY MASS INDEX: 25.21 KG/M2 | HEIGHT: 67 IN | SYSTOLIC BLOOD PRESSURE: 124 MMHG | HEART RATE: 84 BPM | TEMPERATURE: 98.4 F | WEIGHT: 160.6 LBS | DIASTOLIC BLOOD PRESSURE: 62 MMHG | RESPIRATION RATE: 12 BRPM

## 2022-07-06 DIAGNOSIS — S70.361A INSECT BITE OF RIGHT THIGH, INITIAL ENCOUNTER: Primary | ICD-10-CM
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
## 2022-07-06 PROCEDURE — 99213 OFFICE O/P EST LOW 20 MIN: CPT | Performed by: NURSE PRACTITIONER

## 2022-07-06 PROCEDURE — 4004F PT TOBACCO SCREEN RCVD TLK: CPT | Performed by: NURSE PRACTITIONER

## 2022-07-06 PROCEDURE — G8417 CALC BMI ABV UP PARAM F/U: HCPCS | Performed by: NURSE PRACTITIONER

## 2022-07-06 PROCEDURE — G8427 DOCREV CUR MEDS BY ELIG CLIN: HCPCS | Performed by: NURSE PRACTITIONER

## 2022-07-06 NOTE — PROGRESS NOTES
Mary Ellen Evans  is a 40 y.o. y/o female that presents for Insect Bite (right leg right above the knee - started 3 days ago )      Rash    HPI:    Length of time Sx have been present - 3 day(s). Rash has gotten unchanged since initially starting  Affected areas - right leg  Inciting events or exposures prior to rash starting? Yes - bug bite  Pruritic? Yes  Erythematous? Yes  Weeping or drainage? No  History of Urticaria? No  Fever? No  Painful? No        OBJECTIVE:  /62   Pulse 84   Temp 98.4 °F (36.9 °C) (Oral)   Resp 12   Ht 5' 7\" (1.702 m)   Wt 160 lb 9.6 oz (72.8 kg)   LMP 08/18/2019   BMI 25.15 kg/m²   She appears well; non-toxic and in no apparent distress. Extremities - no pedal edema noted, intact peripheral pulses  Skin - erythematous papular lesion right thigh      ASSESSMENT & PLAN  Bonifacio Heller was seen today for insect bite. Diagnoses and all orders for this visit:    Insect bite of right thigh, initial encounter  -     betamethasone valerate (VALISONE) 0.1 % cream; Apply topically 2 times daily. - does not look infected  - start topical steroid cream and monitor    Return if symptoms worsen or fail to improve.      -Start above treatments  -Patient advised to contact our office immediately if symptoms worsen or persist  -Patient counseled on conservative care including skin care and OTC meds    All patient questions answered. Patient voiced understanding.

## 2022-08-18 ENCOUNTER — OFFICE VISIT (OUTPATIENT)
Dept: FAMILY MEDICINE CLINIC | Age: 37
End: 2022-08-18
Payer: COMMERCIAL

## 2022-08-18 VITALS
WEIGHT: 158 LBS | BODY MASS INDEX: 24.8 KG/M2 | OXYGEN SATURATION: 98 % | DIASTOLIC BLOOD PRESSURE: 78 MMHG | HEART RATE: 89 BPM | RESPIRATION RATE: 13 BRPM | HEIGHT: 67 IN | SYSTOLIC BLOOD PRESSURE: 132 MMHG | TEMPERATURE: 98.3 F

## 2022-08-18 DIAGNOSIS — Z13.31 POSITIVE DEPRESSION SCREENING: ICD-10-CM

## 2022-08-18 DIAGNOSIS — F33.1 MAJOR DEPRESSIVE DISORDER, RECURRENT EPISODE, MODERATE WITH ANXIOUS DISTRESS (HCC): Primary | ICD-10-CM

## 2022-08-18 DIAGNOSIS — R41.840 ATTENTION AND CONCENTRATION DEFICIT: ICD-10-CM

## 2022-08-18 DIAGNOSIS — F41.0 ANXIETY ATTACK: ICD-10-CM

## 2022-08-18 DIAGNOSIS — F22 PARANOIA (HCC): ICD-10-CM

## 2022-08-18 DIAGNOSIS — Z56.6 STRESS AT WORK: ICD-10-CM

## 2022-08-18 DIAGNOSIS — R82.90 ABNORMAL URINE ODOR: ICD-10-CM

## 2022-08-18 DIAGNOSIS — Z56.81 SEXUAL HARASSMENT ON JOB: ICD-10-CM

## 2022-08-18 LAB
BILIRUBIN URINE: NEGATIVE
BLOOD URINE, POC: ABNORMAL
CHARACTER, URINE: CLEAR
COLOR, URINE: YELLOW
GLUCOSE URINE: NEGATIVE MG/DL
KETONES, URINE: NEGATIVE
LEUKOCYTE CLUMPS, URINE: NEGATIVE
NITRITE, URINE: POSITIVE
PH, URINE: 5.5 (ref 5–9)
PROTEIN, URINE: NEGATIVE MG/DL
SPECIFIC GRAVITY, URINE: >= 1.03 (ref 1–1.03)
UROBILINOGEN, URINE: 0.2 EU/DL (ref 0–1)

## 2022-08-18 PROCEDURE — G8427 DOCREV CUR MEDS BY ELIG CLIN: HCPCS | Performed by: NURSE PRACTITIONER

## 2022-08-18 PROCEDURE — G8420 CALC BMI NORM PARAMETERS: HCPCS | Performed by: NURSE PRACTITIONER

## 2022-08-18 PROCEDURE — 99214 OFFICE O/P EST MOD 30 MIN: CPT | Performed by: NURSE PRACTITIONER

## 2022-08-18 PROCEDURE — 4004F PT TOBACCO SCREEN RCVD TLK: CPT | Performed by: NURSE PRACTITIONER

## 2022-08-18 RX ORDER — HYDROXYZINE PAMOATE 25 MG/1
25 CAPSULE ORAL 3 TIMES DAILY PRN
Qty: 30 CAPSULE | Refills: 2 | Status: SHIPPED | OUTPATIENT
Start: 2022-08-18 | End: 2022-09-17

## 2022-08-18 RX ORDER — ATOMOXETINE 18 MG/1
18 CAPSULE ORAL DAILY
Qty: 30 CAPSULE | Refills: 3 | Status: SHIPPED | OUTPATIENT
Start: 2022-08-18 | End: 2022-09-15 | Stop reason: SDDI

## 2022-08-18 SDOH — HEALTH STABILITY - MENTAL HEALTH: OTHER PHYSICAL AND MENTAL STRAIN RELATED TO WORK: Z56.6

## 2022-08-18 SDOH — ECONOMIC STABILITY: FOOD INSECURITY: WITHIN THE PAST 12 MONTHS, YOU WORRIED THAT YOUR FOOD WOULD RUN OUT BEFORE YOU GOT MONEY TO BUY MORE.: NEVER TRUE

## 2022-08-18 SDOH — ECONOMIC STABILITY: FOOD INSECURITY: WITHIN THE PAST 12 MONTHS, THE FOOD YOU BOUGHT JUST DIDN'T LAST AND YOU DIDN'T HAVE MONEY TO GET MORE.: NEVER TRUE

## 2022-08-18 ASSESSMENT — PATIENT HEALTH QUESTIONNAIRE - PHQ9
8. MOVING OR SPEAKING SO SLOWLY THAT OTHER PEOPLE COULD HAVE NOTICED. OR THE OPPOSITE, BEING SO FIGETY OR RESTLESS THAT YOU HAVE BEEN MOVING AROUND A LOT MORE THAN USUAL: 2
SUM OF ALL RESPONSES TO PHQ9 QUESTIONS 1 & 2: 6
1. LITTLE INTEREST OR PLEASURE IN DOING THINGS: 3
9. THOUGHTS THAT YOU WOULD BE BETTER OFF DEAD, OR OF HURTING YOURSELF: 0
SUM OF ALL RESPONSES TO PHQ QUESTIONS 1-9: 21
5. POOR APPETITE OR OVEREATING: 3
SUM OF ALL RESPONSES TO PHQ QUESTIONS 1-9: 21
2. FEELING DOWN, DEPRESSED OR HOPELESS: 3
3. TROUBLE FALLING OR STAYING ASLEEP: 3
SUM OF ALL RESPONSES TO PHQ QUESTIONS 1-9: 21
6. FEELING BAD ABOUT YOURSELF - OR THAT YOU ARE A FAILURE OR HAVE LET YOURSELF OR YOUR FAMILY DOWN: 1
10. IF YOU CHECKED OFF ANY PROBLEMS, HOW DIFFICULT HAVE THESE PROBLEMS MADE IT FOR YOU TO DO YOUR WORK, TAKE CARE OF THINGS AT HOME, OR GET ALONG WITH OTHER PEOPLE: 3
7. TROUBLE CONCENTRATING ON THINGS, SUCH AS READING THE NEWSPAPER OR WATCHING TELEVISION: 3
SUM OF ALL RESPONSES TO PHQ QUESTIONS 1-9: 21
4. FEELING TIRED OR HAVING LITTLE ENERGY: 3

## 2022-08-18 ASSESSMENT — SOCIAL DETERMINANTS OF HEALTH (SDOH): HOW HARD IS IT FOR YOU TO PAY FOR THE VERY BASICS LIKE FOOD, HOUSING, MEDICAL CARE, AND HEATING?: NOT HARD AT ALL

## 2022-08-18 ASSESSMENT — COLUMBIA-SUICIDE SEVERITY RATING SCALE - C-SSRS
6. HAVE YOU EVER DONE ANYTHING, STARTED TO DO ANYTHING, OR PREPARED TO DO ANYTHING TO END YOUR LIFE?: NO
2. HAVE YOU ACTUALLY HAD ANY THOUGHTS OF KILLING YOURSELF?: NO
1. WITHIN THE PAST MONTH, HAVE YOU WISHED YOU WERE DEAD OR WISHED YOU COULD GO TO SLEEP AND NOT WAKE UP?: NO
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
## 2022-08-31 ENCOUNTER — TELEPHONE (OUTPATIENT)
Dept: FAMILY MEDICINE CLINIC | Age: 37
End: 2022-08-31

## 2022-09-01 ENCOUNTER — OFFICE VISIT (OUTPATIENT)
Dept: FAMILY MEDICINE CLINIC | Age: 37
End: 2022-09-01
Payer: COMMERCIAL

## 2022-09-01 VITALS
TEMPERATURE: 98.2 F | BODY MASS INDEX: 24.64 KG/M2 | WEIGHT: 157 LBS | OXYGEN SATURATION: 98 % | SYSTOLIC BLOOD PRESSURE: 136 MMHG | HEIGHT: 67 IN | HEART RATE: 77 BPM | RESPIRATION RATE: 12 BRPM | DIASTOLIC BLOOD PRESSURE: 88 MMHG

## 2022-09-01 DIAGNOSIS — R31.1 BENIGN ESSENTIAL MICROSCOPIC HEMATURIA: ICD-10-CM

## 2022-09-01 DIAGNOSIS — F33.1 MAJOR DEPRESSIVE DISORDER, RECURRENT EPISODE, MODERATE WITH ANXIOUS DISTRESS (HCC): ICD-10-CM

## 2022-09-01 DIAGNOSIS — F22 PARANOIA (HCC): ICD-10-CM

## 2022-09-01 DIAGNOSIS — F41.9 ANXIETY: ICD-10-CM

## 2022-09-01 PROCEDURE — 99213 OFFICE O/P EST LOW 20 MIN: CPT | Performed by: NURSE PRACTITIONER

## 2022-09-01 PROCEDURE — 4004F PT TOBACCO SCREEN RCVD TLK: CPT | Performed by: NURSE PRACTITIONER

## 2022-09-01 PROCEDURE — G8427 DOCREV CUR MEDS BY ELIG CLIN: HCPCS | Performed by: NURSE PRACTITIONER

## 2022-09-01 PROCEDURE — G8420 CALC BMI NORM PARAMETERS: HCPCS | Performed by: NURSE PRACTITIONER
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
## 2022-09-06 ENCOUNTER — TELEPHONE (OUTPATIENT)
Dept: FAMILY MEDICINE CLINIC | Age: 37
End: 2022-09-06

## 2022-09-06 NOTE — LETTER
[address] [place] [address]  [address] [place] [address]. [facility] [address]  Phone: [phone]  Fax: [phone]    NACHO Neumann CNP        September 6, 2022     Patient: Tatianna Shields   YOB: 1985   Date of Visit: 9/6/2022       To Whom It May Concern: It is my medical opinion that Rere Adam needs to be excused from work Thursday August 18th, through September 5th, 2022. Patient may return to work Monday September 5th, 2022 without restrictions. If you have any questions or concerns, please don't hesitate to call.     Sincerely,        NACHO Neumann CNP

## 2022-09-15 ENCOUNTER — TELEMEDICINE (OUTPATIENT)
Dept: PSYCHIATRY | Age: 37
End: 2022-09-15
Payer: COMMERCIAL

## 2022-09-15 DIAGNOSIS — F41.9 ANXIETY DISORDER, UNSPECIFIED TYPE: Primary | ICD-10-CM

## 2022-09-15 DIAGNOSIS — F10.20 ALCOHOL USE DISORDER, MODERATE, DEPENDENCE (HCC): ICD-10-CM

## 2022-09-15 DIAGNOSIS — Z72.0 TOBACCO ABUSE: ICD-10-CM

## 2022-09-15 PROCEDURE — 90792 PSYCH DIAG EVAL W/MED SRVCS: CPT | Performed by: STUDENT IN AN ORGANIZED HEALTH CARE EDUCATION/TRAINING PROGRAM

## 2022-09-15 PROCEDURE — 4004F PT TOBACCO SCREEN RCVD TLK: CPT | Performed by: STUDENT IN AN ORGANIZED HEALTH CARE EDUCATION/TRAINING PROGRAM

## 2022-09-15 RX ORDER — CLONIDINE HYDROCHLORIDE 0.1 MG/1
0.1 TABLET ORAL DAILY PRN
Qty: 60 TABLET | Refills: 3 | Status: SHIPPED | OUTPATIENT
Start: 2022-09-15
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
## 2022-09-27 ENCOUNTER — OFFICE VISIT (OUTPATIENT)
Dept: FAMILY MEDICINE CLINIC | Age: 37
End: 2022-09-27
Payer: COMMERCIAL

## 2022-09-27 VITALS
HEIGHT: 67 IN | WEIGHT: 156 LBS | HEART RATE: 89 BPM | BODY MASS INDEX: 24.48 KG/M2 | TEMPERATURE: 97.9 F | DIASTOLIC BLOOD PRESSURE: 78 MMHG | RESPIRATION RATE: 12 BRPM | OXYGEN SATURATION: 96 % | SYSTOLIC BLOOD PRESSURE: 128 MMHG

## 2022-09-27 DIAGNOSIS — F41.9 ANXIETY: ICD-10-CM

## 2022-09-27 DIAGNOSIS — R06.83 SNORES: ICD-10-CM

## 2022-09-27 DIAGNOSIS — J35.1 TONSILLAR HYPERTROPHY: ICD-10-CM

## 2022-09-27 DIAGNOSIS — Z00.00 ENCOUNTER FOR WELL ADULT EXAM WITHOUT ABNORMAL FINDINGS: Primary | ICD-10-CM

## 2022-09-27 DIAGNOSIS — Z72.0 TOBACCO ABUSE: ICD-10-CM

## 2022-09-27 DIAGNOSIS — Z13.29 SCREENING FOR THYROID DISORDER: ICD-10-CM

## 2022-09-27 PROCEDURE — G8427 DOCREV CUR MEDS BY ELIG CLIN: HCPCS | Performed by: NURSE PRACTITIONER

## 2022-09-27 PROCEDURE — 99395 PREV VISIT EST AGE 18-39: CPT | Performed by: NURSE PRACTITIONER

## 2022-09-27 PROCEDURE — 99213 OFFICE O/P EST LOW 20 MIN: CPT | Performed by: NURSE PRACTITIONER

## 2022-09-27 PROCEDURE — 4004F PT TOBACCO SCREEN RCVD TLK: CPT | Performed by: NURSE PRACTITIONER

## 2022-09-27 PROCEDURE — G8420 CALC BMI NORM PARAMETERS: HCPCS | Performed by: NURSE PRACTITIONER

## 2022-09-27 ASSESSMENT — PATIENT HEALTH QUESTIONNAIRE - PHQ9
SUM OF ALL RESPONSES TO PHQ QUESTIONS 1-9: 0
DEPRESSION UNABLE TO ASSESS: FUNCTIONAL CAPACITY MOTIVATION LIMITS ACCURACY
SUM OF ALL RESPONSES TO PHQ9 QUESTIONS 1 & 2: 0
SUM OF ALL RESPONSES TO PHQ QUESTIONS 1-9: 0
1. LITTLE INTEREST OR PLEASURE IN DOING THINGS: 0
2. FEELING DOWN, DEPRESSED OR HOPELESS: 0

## 2022-09-27 ASSESSMENT — ENCOUNTER SYMPTOMS
GASTROINTESTINAL NEGATIVE: 1
RESPIRATORY NEGATIVE: 1

## 2022-09-27 NOTE — PROGRESS NOTES
Well Adult Note  Name: Jean Claude Estimable Date: 2022   MRN: [id number] Sex: Female   Age: 40 y.o. Ethnicity: Non- / Non    : 1985 Race: White (non-)      Josie Pompa is here for well adult exam.  History:  Pt eats a wide variety of foods. Does not Sleep well, going through a divorce, also snores at night,     No formal exercise program.    Sees psychiatry and on clonidine for anxiety this is going well. Smoke 1-1.5 PPD does not want to stop now. Due for labs to be updated. Review of Systems   Constitutional: Negative. HENT: Negative. Respiratory: Negative. Cardiovascular: Negative. Gastrointestinal: Negative. Genitourinary: Negative. Musculoskeletal: Negative. Skin: Negative. Neurological:  Negative for dizziness, facial asymmetry, weakness and headaches. Psychiatric/Behavioral:  Negative for self-injury, sleep disturbance and suicidal ideas. The patient is nervous/anxious. Allergies   Allergen Reactions    Augmentin [Amoxicillin-Pot Clavulanate] Swelling    Bee Venom Hives    Nicotine Swelling     patch    Bee Pollen Swelling and Rash         Prior to Visit Medications    Medication Sig Taking?  Authorizing Provider   cloNIDine (CATAPRES) 0.1 MG tablet Take 1 tablet by mouth daily as needed (for anxiety AS NEEDED) Yes Melissa Mcallister,    fluticasone (FLONASE) 50 MCG/ACT nasal spray 1 spray by Each Nostril route daily Yes NACHO Chisholm CNP   albuterol sulfate HFA (PROVENTIL HFA) 108 (90 Base) MCG/ACT inhaler Inhale 2 puffs into the lungs every 6 hours as needed for Wheezing Yes NACHO Chisholm CNP   acetaminophen (TYLENOL) 325 MG tablet Take 650 mg by mouth every 6 hours as needed for Pain Yes Historical Provider, MD         Past Medical History:   Diagnosis Date    Abnormal Pap smear and cervical HPV (human papillomavirus)     Anxiety     Asthma     Depression     PTSD (post-traumatic stress disorder)     Smoker Substance abuse ([facility] [address]     history of    Tonsillitis     recurrent       Past Surgical History:   Procedure Laterality Date    DENTAL SURGERY      FOOT SURGERY Right 15    HYSTERECTOMY ABDOMINAL N/A 2019    ROBOTIC HYSTER W/ BS performed by Alan Grimes MD [address]  2012         Family History   Problem Relation Age of Onset    Cancer Mother         ovarian in mother  , paternal grandmother- breast cancer    Alcohol Abuse Other          family       Social History     Tobacco Use    Smoking status: Every Day     Packs/day: 0.50     Years: 16.00     Pack years: 8.00     Types: Cigarettes     Last attempt to quit: 12/15/2015     Years since quittin.7    Smokeless tobacco: Never   Vaping Use    Vaping Use: Former    Substances: Occasionally   Substance Use Topics    Alcohol use: Yes     Alcohol/week: 1.0 standard drink     Types: 1 Glasses of wine per week     Comment: occasionally    Drug use: No     Frequency: 7.0 times per week     Types: Marijuana Juanetta Stillwater)     Comment: past history of polysubstance abuse: cannabis, crack, cociane       Objective   /78   Pulse 89   Temp 97.9 °F (36.6 °C)   Resp 12   Ht 5' 7\" (1.702 m)   Wt 156 lb (70.8 kg)   LMP 2019   SpO2 96%   BMI 24.43 kg/m²   Wt Readings from Last 3 Encounters:   22 156 lb (70.8 kg)   22 157 lb (71.2 kg)   22 158 lb (71.7 kg)     There were no vitals filed for this visit. Physical Exam  Vitals and nursing note reviewed. Constitutional:       Appearance: She is not ill-appearing. HENT:      Right Ear: Tympanic membrane, ear canal and external ear normal.      Left Ear: Tympanic membrane, ear canal and external ear normal.      Nose: Nose normal.      Mouth/Throat:      Mouth: Mucous membranes are moist.      Palate: No mass and lesions. Pharynx: No pharyngeal swelling, oropharyngeal exudate, posterior oropharyngeal erythema or uvula swelling.       Tonsils: No tonsillar exudate or tonsillar abscesses. 2+ on the right. 2+ on the left. Comments: Bilateral tonsillar hypertrophy  noted  No anterior cervical lymphadenopathy with palpation  \no tonsillar exudate or erythema   Eyes:      Pupils: Pupils are equal, round, and reactive to light. Cardiovascular:      Rate and Rhythm: Normal rate and regular rhythm. Pulses: Normal pulses. Heart sounds: Normal heart sounds. No murmur heard. Pulmonary:      Effort: Pulmonary effort is normal. No respiratory distress. Breath sounds: Normal breath sounds. No wheezing. Abdominal:      General: Abdomen is flat. Bowel sounds are normal. There is no distension. Palpations: Abdomen is soft. Tenderness: There is no abdominal tenderness. Skin:     General: Skin is warm and dry. Capillary Refill: Capillary refill takes less than 2 seconds. Neurological:      General: No focal deficit present. Mental Status: She is alert and oriented to person, place, and time. Psychiatric:         Attention and Perception: Attention normal.         Mood and Affect: Mood is anxious. Speech: Speech normal.         Behavior: Behavior normal. Behavior is cooperative. Thought Content: Thought content is paranoid (seeing psychiatry for this). Thought content does not include homicidal or suicidal plan. Cognition and Memory: Cognition normal.         Judgment: Judgment normal.         Assessment   Plan   1. Encounter for well adult exam without abnormal findings  -     CBC with Auto Differential; Future  -     TSH With Reflex Ft4; Future  -     Basic Metabolic Panel; Future  2. Anxiety  Controlled with clonidine  Managed by psychiatry  3. Tobacco abuse  4. Tonsillar hypertrophy  -     [address] [place] [address]  5.  Snores  Likely due to tonsillar hypertrophy but r/o sleep apnea   If sleep apnea test positive will refer to ENT for eval for T&A to alleviate sleep apnea  -     [facility]  6.  Screening for thyroid disorder  -     TSH With Reflex Ft4; Future       Personalized Preventive Plan   Current Health Maintenance Status  Immunization History   Administered Date(s) Administered    Tdap (Boostrix, Adacel) 04/28/2016        Health Maintenance   Topic Date Due    COVID-19 Vaccine (1) Never done    Pneumococcal 0-64 years Vaccine (1 - PCV) Never done    Flu vaccine (1) Never done    Depression Monitoring  08/18/2023    DTaP/Tdap/Td vaccine (2 - Td or Tdap) 04/28/2026    Hepatitis C screen  Completed    HIV screen  Completed    Hepatitis A vaccine  Aged Out    Hepatitis B vaccine  Aged Out    Hib vaccine  Aged Out    Meningococcal (ACWY) vaccine  Aged Out    Varicella vaccine  Discontinued     Recommendations for [place] Due: see orders and patient instructions/AVS.    Return in about 1 year (around 9/27/2023) for Physical exam .

## 2022-09-27 NOTE — LETTER
[address] [place] [address]  [address] [place] [address]. [place] [address]  Phone: [phone]  Fax: [phone]    NACHO Leonardo CNP        September 27, 2022     Patient: Eben Dean   YOB: 1985   Date of Visit: 9/27/2022       To Whom It May Concern: It is my medical opinion that Ida Byrd was. Seen in the office Tuesday September 27th, 2022. Patient may return to work with no restrictions on Wednesday September 28th, 2022. If you have any questions or concerns, please don't hesitate to call.     Sincerely,        NACHO Leonardo CNP

## 2022-09-27 NOTE — PATIENT INSTRUCTIONS
Well Visit, Ages 25 to 72: Care Instructions  Well visits can help you stay healthy. Your doctor has checked your overall health and may have suggested ways to take good care of yourself. Your doctor also may have recommended tests. You can help prevent illness with healthy eating, good sleep, vaccinations, regular exercise, and other steps. Get the tests that you and your doctor decide on. Depending on your age and risks, examples might include screening for diabetes; hepatitis C; HIV; and cervical, breast, lung, and colon cancer. Screening helps find diseases before any symptoms appear. Eat healthy foods. Choose fruits, vegetables, whole grains, lean protein, and low-fat dairy foods. Limit saturated fat and reduce salt. Limit alcohol. Men should have no more than 2 drinks a day. Women should have no more than 1. For some people, no alcohol is the best choice. Exercise. Get at least 30 minutes of exercise on most days of the week. Walking can be a good choice. Reach and stay at your healthy weight. This will lower your risk for many health problems. Take care of your mental health. Try to stay connected with friends, family, and community, and find ways to manage stress. If you're feeling depressed or hopeless, talk to someone. A counselor can help. If you don't have a counselor, talk to your doctor. Talk to your doctor if you think you may have a problem with alcohol or drug use. This includes prescription medicines and illegal drugs. Avoid tobacco and nicotine: Don't smoke, vape, or chew. If you need help quitting, talk to your doctor. Practice safer sex. Getting tested, using condoms or dental dams, and limiting sex partners can help prevent STIs. Use birth control if it's important to you to prevent pregnancy. Talk with your doctor about your choices and what might be best for you. Prevent problems where you can.  Protect your skin from too much sun, wash your hands, brush your teeth twice a day, and wear a seat belt in the car. Where can you learn more? Go to [email or web address].[email or web address] org and sign in to your [employer] account. Enter P072 in the [facility] box to learn more about \"Well Visit, Ages 25 to 72: Care Instructions. \"     If you do not have an account, please click on the \"Sign Up Now\" link. Current as of: March 9, 2022               Content Version: 13.4  © 4382-3366 Healthwise, [employer]. Care instructions adapted under license by [employer]. If you have questions about a medical condition or this instruction, always ask your healthcare professional. [address] any warranty or liability for your use of this information.

## 2022-10-12 ENCOUNTER — TELEPHONE (OUTPATIENT)
Dept: FAMILY MEDICINE CLINIC | Age: 37
End: 2022-10-12
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
## 2022-10-13 ENCOUNTER — TELEMEDICINE (OUTPATIENT)
Dept: PSYCHIATRY | Age: 37
End: 2022-10-13
Payer: COMMERCIAL

## 2022-10-13 DIAGNOSIS — F10.20 ALCOHOL USE DISORDER, MODERATE, DEPENDENCE (HCC): ICD-10-CM

## 2022-10-13 DIAGNOSIS — G47.00 INSOMNIA, UNSPECIFIED TYPE: ICD-10-CM

## 2022-10-13 DIAGNOSIS — Z72.0 TOBACCO ABUSE: ICD-10-CM

## 2022-10-13 DIAGNOSIS — F41.9 ANXIETY DISORDER, UNSPECIFIED TYPE: Primary | ICD-10-CM

## 2022-10-13 PROCEDURE — G8484 FLU IMMUNIZE NO ADMIN: HCPCS | Performed by: STUDENT IN AN ORGANIZED HEALTH CARE EDUCATION/TRAINING PROGRAM

## 2022-10-13 PROCEDURE — G8420 CALC BMI NORM PARAMETERS: HCPCS | Performed by: STUDENT IN AN ORGANIZED HEALTH CARE EDUCATION/TRAINING PROGRAM

## 2022-10-13 PROCEDURE — G8428 CUR MEDS NOT DOCUMENT: HCPCS | Performed by: STUDENT IN AN ORGANIZED HEALTH CARE EDUCATION/TRAINING PROGRAM

## 2022-10-13 PROCEDURE — 99214 OFFICE O/P EST MOD 30 MIN: CPT | Performed by: STUDENT IN AN ORGANIZED HEALTH CARE EDUCATION/TRAINING PROGRAM

## 2022-10-13 PROCEDURE — 4004F PT TOBACCO SCREEN RCVD TLK: CPT | Performed by: STUDENT IN AN ORGANIZED HEALTH CARE EDUCATION/TRAINING PROGRAM

## 2022-10-13 RX ORDER — MIRTAZAPINE 15 MG/1
15 TABLET, FILM COATED ORAL
Qty: 30 TABLET | Refills: 1 | Status: SHIPPED | OUTPATIENT
Start: 2022-10-13

## 2022-10-13 NOTE — PROGRESS NOTES
[address] [facility] [address]  [facility] [address]  Dept: [phone]  Loc: Min Cavazos  1985  10/13/2022     TELEHEALTH EVALUATION -- Audio/Visual (During IIJBU-12 public health emergency)    Patient location: home  Physician location: [place], [facility]  This virtual visit was conducted via interactive, real-time video. F/U appt    DSM:  Dx:    ICD-10-CM    1. Anxiety disorder, unspecified type  F41.9       2. Alcohol use disorder, moderate, dependence (HCC)  F10.20       3. Tobacco abuse  Z72.0            Interim Hx:  Chief Complaint:   Chief Complaint   Patient presents with    Anxiety    Follow-up        HPI: Patient is a 40year old female who presents for follow up. She denies depression. Mood is \"good\". Energy is regular, but motivation is low. She reports only sleeping 3 hours per night. Her anxiety is intermittently elevated. However, she has only needed her as needed clonidine \"a few times\". Reports benefit from this medication and denies side effects. Denies active and passive suicidal ideation as well as auditory or visual hallucinations. Patient reports her \"drinking went down\". She reports having consumed \"6 beers in the last few days\". She is agreeable to trialing mirtazapine 15 mg qhs prn for sleep. ROS:   Gen: Denies F/C/N/V  HEENT: Denies Vision/hearing changes/sore throat  CV: Denies CP/Palp  Pulm: Denies SOB/Cough/Wheeze  GI: Denies Abd pain  Skin: Denies Rashes/Lumps/Bruises  Neurologic: Denies changes in memory/speech/mental status.  Denies h/o seizures/DTs   Psychiatric: +ANXIETY     MSE:  Cognition - Alert and Oriented x 4  Appearance - dressed casually and appropriately for the weather  Behavior - pleasant and cooperative  Motor - no tardive dyskinesia or other EPS observed; no psychomotor agitation/retardation observed  Gait and Station - unable to assess via tele-psychiatry  Speech - normal rate, rhythm, and volume  Eye Contact - maintains  Mood - \"good\"  Affect - full range  Thought Content/Perceptions:   Psychosis - denies AH/VH   SI/HI -  denies  Thought Process/Associations -  logical and linear  Memory - grossly intact  Insight - good  Judgment - good       Diagnosis Orders   1. Anxiety disorder, unspecified type        2. Alcohol use disorder, moderate, dependence ([facility] [address]        3. Tobacco abuse             Plan:  -Continue clonidine 0.1 mg daily prn for anxiety. Instructed to discontinue this medication if she feels lightheaded or dizzy due to hypotension.  -Start mirtazapine 15 mg qhs prn for sleep.  -Patient does not wish to take a scheduled medication because she reports a hx of noncompliance and struggles to take medications daily.  -Encouraged to lessen alcohol intake. Risk of alcohol withdrawal was discussed including potential for seizures and death. She was instructed to detox in an inpatient facility if she ever plans on alcohol cessation. .  -Discussed medications with the patient and changes are as noted above with patient agreement.  -Discussed diagnosis and treatment plan with the patient  -Provided brief supportive therapy through active listening     -Patient Education: Discussed medications including indications, potential side effects, contraindications, and risks/benefits; also discussed alternative medications/treatments. Discussed the potential need for follow up laboratory studies related to medications and patient demonstrated understanding and compliance. Patient participated in medications decision making was given the opportunity to ask questions/express preferences and concerns. Patient demonstrates good understanding of medications and is compliant/in agreement with current plan. -The patient is a female in her childbearing years.  She verbalized understanding that there are no medications that are completely safe in pregnancy or while breastfeeding. She states she does not believe that she is pregnant now and agrees to contact her prescribing psychiatrist immediately if she is pregnant or planning to become pregnant. -RTC in (4) weeks, sooner if needed  -Labs: n/a  -Discussed abstaining from illicit drugs/alcohol  -Patient informed to call crisis line/911 or go to ER if experiencing crisis, SI, HI, or psychosis. Loel , was evaluated through a synchronous (real-time) audio-video encounter. The patient (or guardian if applicable) is aware that this is a billable service, which includes applicable copays. This virtual visit was conducted with patient's (and/or legal guardian's) consent. The visit was conducted pursuant to the emergency declaration under the [address] [place] [address] waiver authority and the [place] [employer] [place] and [employer] General Act. Patient identification was verified, and a caregiver was present when appropriate. The patient was located in a state where the provider was licensed to provide care.       Total time spent for this encounter: 30 minutes    Electronically signed by Ricki Chow DO on 10/13/2022 at 3:00 PM       Ricki Chow DO  10/13/2022

## 2022-11-17 ENCOUNTER — TELEMEDICINE (OUTPATIENT)
Dept: PSYCHIATRY | Age: 37
End: 2022-11-17
Payer: COMMERCIAL

## 2022-11-17 DIAGNOSIS — F41.9 ANXIETY DISORDER, UNSPECIFIED TYPE: Primary | ICD-10-CM

## 2022-11-17 DIAGNOSIS — G47.00 INSOMNIA, UNSPECIFIED TYPE: ICD-10-CM

## 2022-11-17 DIAGNOSIS — F10.20 ALCOHOL USE DISORDER, MODERATE, DEPENDENCE (HCC): ICD-10-CM

## 2022-11-17 DIAGNOSIS — Z72.0 TOBACCO ABUSE: ICD-10-CM

## 2022-11-17 PROCEDURE — G8428 CUR MEDS NOT DOCUMENT: HCPCS | Performed by: STUDENT IN AN ORGANIZED HEALTH CARE EDUCATION/TRAINING PROGRAM

## 2022-11-17 PROCEDURE — 99214 OFFICE O/P EST MOD 30 MIN: CPT | Performed by: STUDENT IN AN ORGANIZED HEALTH CARE EDUCATION/TRAINING PROGRAM

## 2022-11-17 PROCEDURE — G8420 CALC BMI NORM PARAMETERS: HCPCS | Performed by: STUDENT IN AN ORGANIZED HEALTH CARE EDUCATION/TRAINING PROGRAM

## 2022-11-17 PROCEDURE — 4004F PT TOBACCO SCREEN RCVD TLK: CPT | Performed by: STUDENT IN AN ORGANIZED HEALTH CARE EDUCATION/TRAINING PROGRAM

## 2022-11-17 PROCEDURE — G8484 FLU IMMUNIZE NO ADMIN: HCPCS | Performed by: STUDENT IN AN ORGANIZED HEALTH CARE EDUCATION/TRAINING PROGRAM

## 2022-11-17 RX ORDER — MIRTAZAPINE 15 MG/1
15 TABLET, FILM COATED ORAL
Qty: 30 TABLET | Refills: 1 | Status: SHIPPED | OUTPATIENT
Start: 2022-11-17

## 2022-11-17 RX ORDER — PROPRANOLOL HYDROCHLORIDE 10 MG/1
10 TABLET ORAL 2 TIMES DAILY PRN
Qty: 60 TABLET | Refills: 1 | Status: SHIPPED | OUTPATIENT
Start: 2022-11-17

## 2022-11-17 NOTE — PROGRESS NOTES
[address] [facility] [address]  [facility] [address]  Dept: [phone]  Loc: Min Cavazos  1985 11/17/2022     F/U appt    TELEHEALTH EVALUATION -- Audio/Visual (During QVQRF-31 public health emergency)    Patient location: home  Physician location: home, [facility]  This virtual visit was conducted via interactive, real-time video. DSM:  Dx:    ICD-10-CM    1. Anxiety disorder, unspecified type  F41.9       2. Alcohol use disorder, moderate, dependence (HCC)  F10.20       3. Tobacco abuse  Z72.0       4. Insomnia, unspecified type  G47.00            Interim Hx:  Chief Complaint:   Chief Complaint   Patient presents with    Anxiety    Follow-up    Medication Check      HPI: Patient is a 40year old female who presents for follow up. Denies depression. Patient continues to report intermittent periods of significantly elevated anxiety. Clonidine was too sedating, so she discontinued it. She is agreeable to trialing propranolol 10 mg bid prn for anxiety. Her sleep has also been erratic, some of which she attributes to schedule changes at work. Accordingly, she is agreeable to trialing mirtazapine 15 mg qhs prn for sleep. Denies active and passive suicidal ideation as well as auditory or visual hallucinations. ROS:   Gen: Denies F/C/N/V  HEENT: Denies Vision/hearing changes/sore throat  CV: Denies CP/Palp  Pulm: Denies SOB/Cough/Wheeze  GI: Denies Abd pain  Skin: Denies Rashes/Lumps/Bruises  Neurologic: Denies changes in memory/speech/mental status.  Denies h/o seizures/DTs   Psychiatric: +ANXIETY     MSE:  Cognition - Alert and Oriented x 4  Appearance - dressed casually and appropriately for the weather  Behavior - pleasant and cooperative  Motor - no tardive dyskinesia or other EPS observed; no psychomotor agitation/retardation observed  Gait and Station - unable to assess via tele-psychiatry  Speech - normal rate, rhythm, and volume  Eye Contact - maintains  Mood - \"good\"  Affect - full range  Thought Content/Perceptions:   Psychosis - denies AH/VH; endorses some paranoia specific to coworkers   SI/HI -  denies  Thought Process/Associations -  logical and linear  Memory - grossly intact  Insight - good  Judgment - good       Diagnosis Orders   1. Anxiety disorder, unspecified type        2. Alcohol use disorder, moderate, dependence ([facility] [address]        3. Tobacco abuse        4. Insomnia, unspecified type             Plan:  -Stop clonidine 0.1 mg daily prn for anxiety. Start propranolol 10 mg bid prn. Instructed to discontinue this medication if she feels lightheaded or dizzy due to hypotension. Patient does not wish to take a scheduled medication. She admits to medication noncompliance.  -Start mirtazapine 15 mg qhs prn for sleep.  -Encouraged to lessen alcohol intake. Risk of alcohol withdrawal was discussed including potential for seizures and death. She was instructed to detox in an inpatient facility if she ever plans on alcohol cessation.  -Discussed medications with the patient and changes are as noted above with patient agreement.  -Discussed diagnosis and treatment plan with the patient  -Provided brief supportive therapy through active listening     -Patient Education: Discussed medications including indications, potential side effects, contraindications, and risks/benefits; also discussed alternative medications/treatments. Discussed the potential need for follow up laboratory studies related to medications and patient demonstrated understanding and compliance. Patient participated in medications decision making was given the opportunity to ask questions/express preferences and concerns. Patient demonstrates good understanding of medications and is compliant/in agreement with current plan.      -RTC in (6) weeks, sooner if needed  -Labs: n/a  -Discussed abstaining from illicit drugs/alcohol  -Patient informed to call crisis line/911 or go to ER if experiencing crisis, SI, HI, or psychosis. Aminata Nunez was evaluated through a synchronous (real-time) audio-video encounter. The patient (or guardian if applicable) is aware that this is a billable service, which includes applicable copays. This virtual visit was conducted with patient's (and/or legal guardian's) consent. The visit was conducted pursuant to the emergency declaration under the [address] [place] [address] authority and the [employer] Act. Patient identification was verified, and a caregiver was present when appropriate. The patient was located in a state where the provider was licensed to provide care.       Total time spent for this encounter: 30 minutes    Electronically signed by Radhames Wong DO on 11/17/2022 at 4:30 PM     Radhames Wong DO  11/17/2022

## 2023-01-05 ENCOUNTER — TELEMEDICINE (OUTPATIENT)
Dept: FAMILY MEDICINE CLINIC | Age: 38
End: 2023-01-05
Payer: COMMERCIAL

## 2023-01-05 DIAGNOSIS — H10.021 OTHER MUCOPURULENT CONJUNCTIVITIS OF RIGHT EYE: ICD-10-CM

## 2023-01-05 DIAGNOSIS — B96.89 ACUTE BACTERIAL SINUSITIS: ICD-10-CM

## 2023-01-05 DIAGNOSIS — R09.81 NASAL CONGESTION: Primary | ICD-10-CM

## 2023-01-05 DIAGNOSIS — J01.90 ACUTE BACTERIAL SINUSITIS: ICD-10-CM

## 2023-01-05 PROCEDURE — G8427 DOCREV CUR MEDS BY ELIG CLIN: HCPCS | Performed by: NURSE PRACTITIONER

## 2023-01-05 PROCEDURE — 99214 OFFICE O/P EST MOD 30 MIN: CPT | Performed by: NURSE PRACTITIONER

## 2023-01-05 PROCEDURE — G8420 CALC BMI NORM PARAMETERS: HCPCS | Performed by: NURSE PRACTITIONER

## 2023-01-05 PROCEDURE — G8484 FLU IMMUNIZE NO ADMIN: HCPCS | Performed by: NURSE PRACTITIONER

## 2023-01-05 PROCEDURE — 4004F PT TOBACCO SCREEN RCVD TLK: CPT | Performed by: NURSE PRACTITIONER

## 2023-01-05 RX ORDER — ERYTHROMYCIN 5 MG/G
OINTMENT OPHTHALMIC
Qty: 1 EACH | Refills: 2 | Status: SHIPPED | OUTPATIENT
Start: 2023-01-05 | End: 2023-01-15

## 2023-01-05 RX ORDER — CEFDINIR 300 MG/1
300 CAPSULE ORAL 2 TIMES DAILY
Qty: 14 CAPSULE | Refills: 0 | Status: SHIPPED | OUTPATIENT
Start: 2023-01-05 | End: 2023-01-12

## 2023-01-05 RX ORDER — FLUTICASONE PROPIONATE 50 MCG
1 SPRAY, SUSPENSION (ML) NASAL DAILY
Qty: 32 G | Refills: 1 | Status: SHIPPED | OUTPATIENT
Start: 2023-01-05

## 2023-01-05 ASSESSMENT — ENCOUNTER SYMPTOMS
EYE PAIN: 0
TROUBLE SWALLOWING: 0
PHOTOPHOBIA: 0
RESPIRATORY NEGATIVE: 1
VOICE CHANGE: 0
EYE ITCHING: 0
EYE DISCHARGE: 1
SORE THROAT: 0
SINUS PAIN: 1
SINUS PRESSURE: 1
RHINORRHEA: 1
EYE REDNESS: 0

## 2023-01-05 NOTE — PROGRESS NOTES
2023    TELEHEALTH EVALUATION -- Audio/Visual (During SJBHV-09 public health emergency)    HPI:    Higinio Terry (:  1985) has requested an audio/video evaluation for the following concern(s): Eye Complaint    Location - right eye   Length of symptoms - 3 days. Redness - No  Burning - No  Matting or Drainage - Yes - admits to green right eye drianage for the last 2 days   Changes in vision - No  Painful - No  Photophobia - No  Inciting Event or Trauma - she does wear contact lenses that are weeklys  Associated Headache - No    Does patient wear contacts - Yes, If yes, any inappropriate use? (Overnight, longer than prescribed, etc.) - as above     No significant prior ophthalmological history. Pt also admits to right sided nasal congestion and sometimes obstruction with right sided maxillary sinus pain and pressure, blowing green discharge from her nares, took a home covid test that was negative no fever chills sweats or body aches. No other symptoms. Review of Systems   Constitutional:  Negative for chills, fatigue and fever. HENT:  Positive for congestion (rigth nares), rhinorrhea, sinus pressure and sinus pain. Negative for sneezing, sore throat, tinnitus, trouble swallowing and voice change. Eyes:  Positive for discharge. Negative for photophobia, pain, redness, itching and visual disturbance. No swelling or redness of upper or lower eyelid   Respiratory: Negative. Cardiovascular: Negative. Genitourinary:  Negative for difficulty urinating. Musculoskeletal:  Negative for arthralgias and myalgias. Neurological:  Negative for dizziness, facial asymmetry, weakness, light-headedness and headaches. Prior to Visit Medications    Medication Sig Taking?  Authorizing Provider   cefdinir (OMNICEF) 300 MG capsule Take 1 capsule by mouth 2 times daily for 7 days Yes Tera Holter, APRN - CNP   erythromycin [facility]) 5 MG/GM ophthalmic ointment Apply 1/2 inch to right eye bid Yes NACHO Britton CNP   fluticasone [facility]) 50 MCG/ACT nasal spray 1 spray by Each Nostril route daily Yes NACHO Britton CNP   propranolol (INDERAL) 10 MG tablet Take 1 tablet by mouth 2 times daily as needed (for anxiety) For anxiety (separate doses by at least 4 hours)  Terra Fall, DO   mirtazapine (REMERON) 15 MG tablet Take 1 tablet by mouth nightly as needed (AS NEEDED FOR INSOMNIA) For sleep  Terra Fall, DO   fluticasone (FLONASE) 50 MCG/ACT nasal spray 1 spray by Each Nostril route daily  NACHO Aguilar CNP   albuterol sulfate HFA (PROVENTIL HFA) 108 (90 Base) MCG/ACT inhaler Inhale 2 puffs into the lungs every 6 hours as needed for Wheezing  NACHO Britton CNP   acetaminophen (TYLENOL) 325 MG tablet Take 650 mg by mouth every 6 hours as needed for Pain  Historical Provider, MD       Social History     Tobacco Use    Smoking status: Every Day     Packs/day: 0.50     Years: 16.00     Pack years: 8.00     Types: Cigarettes     Last attempt to quit: 12/15/2015     Years since quittin.0    Smokeless tobacco: Never   Vaping Use    Vaping Use: Former    Substances: Occasionally   Substance Use Topics    Alcohol use:  Yes     Alcohol/week: 1.0 standard drink     Types: 1 Glasses of wine per week     Comment: occasionally    Drug use: No     Frequency: 7.0 times per week     Types: Marijuana (Weed)     Comment: past history of polysubstance abuse: cannabis, crack, cociane        Allergies   Allergen Reactions    Augmentin [Amoxicillin-Pot Clavulanate] Swelling    Bee Venom Hives    Nicotine Swelling     patch    Bee Pollen Swelling and Rash   ,   Past Medical History:   Diagnosis Date    Abnormal Pap smear and cervical HPV (human papillomavirus)     Anxiety     Asthma     Depression     PTSD (post-traumatic stress disorder)     Smoker     Substance abuse (HCC)     history of    Tonsillitis     recurrent   ,   Past Surgical History:   Procedure Laterality Date    DENTAL SURGERY      FOOT SURGERY Right 15    HYSTERECTOMY ABDOMINAL N/A 2019    ROBOTIC HYSTER W/ BS performed by Anny Patterson MD at [address] [facility]  2012   ,   Social History     Tobacco Use    Smoking status: Every Day     Packs/day: 0.50     Years: 16.00     Pack years: 8.00     Types: Cigarettes     Last attempt to quit: 12/15/2015     Years since quittin.0    Smokeless tobacco: Never   Vaping Use    Vaping Use: Former    Substances: Occasionally   Substance Use Topics    Alcohol use:  Yes     Alcohol/week: 1.0 standard drink     Types: 1 Glasses of wine per week     Comment: occasionally    Drug use: No     Frequency: 7.0 times per week     Types: Marijuana (Weed)     Comment: past history of polysubstance abuse: cannabis, crack, cociane   ,   Family History   Problem Relation Age of Onset    Cancer Mother         ovarian in mother  , paternal grandmother- breast cancer    Alcohol Abuse Other          family   ,   Immunization History   Administered Date(s) Administered    Tdap (Boostrix, Adacel) 2016   ,   Health Maintenance   Topic Date Due    COVID-19 Vaccine (1) Never done    Pneumococcal 0-64 years Vaccine (1 - PCV) Never done    Flu vaccine (1) Never done    Depression Monitoring  2023    DTaP/Tdap/Td vaccine (2 - Td or Tdap) 2026    Hepatitis C screen  Completed    HIV screen  Completed    Hepatitis A vaccine  Aged Out    Hib vaccine  Aged Out    Meningococcal (ACWY) vaccine  Aged Out    Varicella vaccine  Discontinued       PHYSICAL EXAMINATION:  [ INSTRUCTIONS:  \"[x]\" Indicates a positive item  \"[]\" Indicates a negative item  -- DELETE ALL ITEMS NOT EXAMINED]  Vital Signs: (As obtained by patient/caregiver or practitioner observation)    Blood pressure-  Heart rate-    Respiratory rate-    Temperature-  Pulse oximetry-     Constitutional: [x] Appears well-developed and well-nourished [x] No apparent distress      [] Abnormal-   Mental status  [x] Alert and awake  [x] Oriented to person/place/time [x]Able to follow commands      Eyes:  EOM    [x]  Normal  [] Abnormal-  Sclera  [x]  Normal  [] Abnormal -         Discharge []  None visible  [x] Abnormal -right eye with green discharge, no erythema or edema of right upper or lower eyelid    HENT:   [x] Normocephalic, atraumatic. [] Abnormal   [x] Mouth/Throat: Mucous membranes are moist.     External Ears [x] Normal  [] Abnormal-     Neck: [x] No visualized mass     Pulmonary/Chest: [x] Respiratory effort normal.  [x] No visualized signs of difficulty breathing or respiratory distress        [] Abnormal-      Musculoskeletal:   [] Normal gait with no signs of ataxia         [] Normal range of motion of neck        [] Abnormal-       Neurological:        [] No Facial Asymmetry (Cranial nerve 7 motor function) (limited exam to video visit)          [] No gaze palsy        [] Abnormal-         Skin:        [x] No significant exanthematous lesions or discoloration noted on facial skin         [] Abnormal-            Psychiatric:       [x] Normal Affect [] No Hallucinations        [] Abnormal-     Other pertinent observable physical exam findings-     ASSESSMENT/PLAN:  1. Nasal congestion  Could be due to URI vs nasal obstruction  Use flonase and take omnicef if no better will consider CT orf sinus and r/o nasal obstruction  Also advised to use iwona pot irrigation     2. Acute bacterial sinusitis  Omnicef  Iwona pot    3. Other mucopurulent conjunctivitis of right eye    - erythromycin (ROMYCIN) 5 MG/GM ophthalmic ointment; Apply 1/2 inch to right eye bid  Dispense: 1 each; Refill: 2  Pt in agreement with plan  Report to optometrist for any worsening eye symptoms  Keep contact out for 10 days   No follow-ups on file. Eben President, was evaluated through a synchronous (real-time) audio-video encounter. The patient (or guardian if applicable) is aware that this is a billable service, which includes applicable co-pays. This Virtual Visit was conducted with patient's (and/or legal guardian's) consent. The visit was conducted pursuant to the emergency declaration under the [facility], [address] [place] [address] authority and the [employer] Act. Patient identification was verified, and a caregiver was present when appropriate. The patient was located at Home: [address] [facility]. Provider was located at [facility] ([address] [place] [address]): [facility].  GALLO DAMICO II.[facility]. Total time spent on this encounter: Not billed by time    --NACHO Donis CNP on 1/5/2023 at 11:29 AM    An electronic signature was used to authenticate this note.

## 2023-01-13 NOTE — PROGRESS NOTES
[facility] initial consultation note    Adilene Molina                                                Chief complaint: Adilene Molina is a 40 y. o.oldfemale came for further evaluation regarding her ?sleep apnea  with referral from Ms. Janet Resendiz, APRN - CNP. [place]:    Sleep/Wake schedule:  Usual time to go to bed during the work/regular day of week:   Usual time to wake up during the work//regular day of week:   Over the weekends her sleep schedule: [] Remain same. []phase delayed. She usually falls a sleep in less than:    She takes naps: {YES/NO:}. Number of naps per week:   During each nap she spends a total of: The naps were reported as refreshing: No      Sleep Hygiene:  Is the temperature and evironment in her bed room is acceptable to her: Yes. She watches Television in her bed room: {YES/NO:}. She read books, study, pay bills etc in the bed: {YES/NO:}. Frequency She wake up during night/sleep:   Majority of nocturnal awakenings are for urination: {YES/NO:}. Difficulty in falling back to sleep after nocturnal awakenings: No  .  Do you drink coffee: {YES/NO:}. Do you drink caffeinated beverages i.e sodas: {YES/NO:}. Do you drink tea: {YES/NO:}. Do you drink alcoholic beverages: {CDS/LT:28628}. History of recreational drug use: No    Social History     Tobacco Use    Smoking status: Every Day     Packs/day: 0.50     Years: 16.00     Pack years: 8.00     Types: Cigarettes     Last attempt to quit: 12/15/2015     Years since quittin.0    Smokeless tobacco: Never   Vaping Use    Vaping Use: Former    Substances: Occasionally   Substance Use Topics    Alcohol use:  Yes     Alcohol/week: 1.0 standard drink     Types: 1 Glasses of wine per week     Comment: occasionally    Drug use: No     Frequency: 7.0 times per week     Types: Marijuana Gallo Boss     Comment: past history of polysubstance abuse: cannabis, crack, cociane       Sleep apnea symptoms:  Noticed to have loud snoring:{YES/NO:19726}. Noted by her family member- {Persons; family members:[id number]}  Witnessed apneas during sleep noticed: {YES/NO:19726}. Noted by her family member- {Persons; family members:[id number]}  History of choking and gasping sensation at night time: {YES/NO:19726}. History of headaches in the morning:{YES/NO:19726}. History of dry mouth in the morning: {YES/NO:19726}. History of palpitations during night time/nocturnal awakenings: {YES/NO:19726}. History of sweating during night time/nocturnal awakenings: {YES/NO:19726}. General:  History of head injury in the past: {YES/NO:19726}. History of seizures: No  Rest less legs syndrome symptoms:NO  History suggestive of periodic limb movements during sleep: NO  History suggestive of hypnagogic hallucinations: NO  History suggestive of hypnopompic hallucinations: NO  History suggestive of sleep talking:NO  History suggestive of sleep walking:NO  History suggestive of bruxism: NO  [] No mouth guard. [] Uses mouth guard. History suggestive of cataplexy: NO  History suggestive of sleep paralysis: NO    Family history of sleep disorders:  Family history of obstructive sleep apnea: {YES/NO:19726}. Family history of Narcolepsy: {YES/NO:19726}. Family history of Rest less legs syndrome : {YES/NO:19726}. History regarding old sleep studies:  Prior history of sleep study: {YES/NO:19726}. Using CPAP device: {YES/NO:19726}. Currently using home Oxygen: NO.       Patient considerations:  Is the patient is ambulatory: Yes  Patient is currently using: None of these Wheelchair, [facility] or [facility].   Para/Quadriplegic: NO  Hearing deficit : NO  Claustrophobic: NO  MDD : NO  Blind: NO  Incontinent: NO  Para/Quadraplegi: NO.   Need transportation to and from Sleep Center:NO      Social History:  Social History     Tobacco Use    Smoking status: Every Day Packs/day: 0.50     Years: 16.00     Pack years: 8.00     Types: Cigarettes     Last attempt to quit: 12/15/2015     Years since quittin.0    Smokeless tobacco: Never   Vaping Use    Vaping Use: Former    Substances: Occasionally   Substance Use Topics    Alcohol use: Yes     Alcohol/week: 1.0 standard drink     Types: 1 Glasses of wine per week     Comment: occasionally    Drug use: No     Frequency: 7.0 times per week     Types: Marijuana (Weed)     Comment: past history of polysubstance abuse: cannabis, crack, cociane   . She is currently working: {YES/NO:}. [] Retired.    []Disabled     She usually goes to her work at:   She completes her work at:                           Past Medical History:   Diagnosis Date    Abnormal Pap smear and cervical HPV (human papillomavirus)     Anxiety     Asthma     Depression     PTSD (post-traumatic stress disorder)     Smoker     Substance abuse ([address]     history of    Tonsillitis     recurrent       Past Surgical History:   Procedure Laterality Date    DENTAL SURGERY      FOOT SURGERY Right 15    HYSTERECTOMY ABDOMINAL N/A 2019    ROBOTIC HYSTER W/ BS performed by Gianna Carrera MD at [address] [facility] [address]  2012       Allergies   Allergen Reactions    Augmentin [Amoxicillin-Pot Clavulanate] Swelling    Bee Venom Hives    Nicotine Swelling     patch    Bee Pollen Swelling and Rash       Current Outpatient Medications   Medication Sig Dispense Refill    erythromycin (ROMYCIN) 5 MG/GM ophthalmic ointment Apply 1/2 inch to right eye bid 1 each 2    fluticasone (FLONASE) 50 MCG/ACT nasal spray 1 spray by Each Nostril route daily 32 g 1    propranolol (INDERAL) 10 MG tablet Take 1 tablet by mouth 2 times daily as needed (for anxiety) For anxiety (separate doses by at least 4 hours) 60 tablet 1    mirtazapine (REMERON) 15 MG tablet Take 1 tablet by mouth nightly as needed (AS NEEDED FOR INSOMNIA) For sleep 30 tablet 1    fluticasone (FLONASE) 50 MCG/ACT nasal spray 1 spray by Each Nostril route daily 32 g 1    albuterol sulfate HFA (PROVENTIL HFA) 108 (90 Base) MCG/ACT inhaler Inhale 2 puffs into the lungs every 6 hours as needed for Wheezing 1 Inhaler 3    acetaminophen (TYLENOL) 325 MG tablet Take 650 mg by mouth every 6 hours as needed for Pain       No current facility-administered medications for this visit. Family History   Problem Relation Age of Onset    Cancer Mother         ovarian in mother  , paternal grandmother- breast cancer    Alcohol Abuse Other          family        Review of Systems:   General/Constitutional: No recent loss of weight or appetite changes. No fever or chills. HENT: Negative. Eyes: Negative. Upper respiratory tract: No nasal stuffiness or post nasal drip. Lower respiratory tract/ lungs: No cough or sputum production. No hemoptysis. Cardiovascular: No palpitations or chest pain. Gastrointestinal: No nausea or vomiting. Neurological: No focal neurologiacal weakness. Extremities: No edema. Musculoskeletal: No complaints. Genitourinary: No complaints. Hematological: Negative. Psychiatric/Behavioral: Negative. Skin: No itching. LMP 08/18/2019   BMI:  There is no height or weight on file to calculate BMI. Physical Exam:   Nursing note and vitals reviewed. Constitutional: Patient appears moderately built and moderately nourished. No distress. Patient is oriented to person, place, and time. HENT:   Head: Normocephalic and atraumatic. Right Ear: External ear normal.   Left Ear: External ear normal.   Mouth/Throat: Oropharynx is clear and moist.  No oral thrush. Eyes: Conjunctivae are normal. Pupils are equal, round, and reactive to light. No scleral icterus. Neck: Neck supple. No JVD present. No tracheal deviation present. Cardiovascular: Normal rate, regular rhythm, normal heart sounds. No murmur heard. Pulmonary/Chest: Effort normal and breath sounds normal. No stridor.  No respiratory distress.  No wheezes. No rales. Patient exhibits no tenderness.   Abdominal: Soft. Patient exhibits no distension. No tenderness.   Musculoskeletal: Normal range of motion.   Extremities: Patient exhibits no edema and no tenderness.   Lymphadenopathy:  No cervical adenopathy.   Neurological: Patient is alert and oriented to person, place, and time.   Skin: Skin is warm and dry. Patient is not diaphoretic.   Psychiatric: Patient  has a normal mood and affect. Patient behavior is normal.     Diagnostic Data:  None related sleep.      Assessment:  -Snoring {Desc; with/without:5700} witnessed apneas,frequent nocturnal awakenings and excessive daytime sleepiness to evaluate for obstructive sleep apnea.  -Inadequate sleep hygiene.    Past Medical History:   Diagnosis Date    Abnormal Pap smear and cervical HPV (human papillomavirus)     Anxiety     Asthma     Depression     PTSD (post-traumatic stress disorder)     Smoker     Substance abuse (HCC)     history of    Tonsillitis     recurrent       Recommendations/Plan:  -Will schedule patient for polysomnogram in the sleep lab.   -I had a discussion with patient regarding avialable treatment options for her sleep disorder breathing including but not limited to CPAP titration in the sleep lab Vs.Dental appliance placement with referral to a local dentist Vs other available surgical options including Uvulopalatopharyngoplasty, maxillomandibular ostomy,Inspire device placement and tracheostomy as last option. At the end of discussion, she is not decided on her   treatment if she found to have obstructive sleep apnea at this time.  -We will see Denisse Hilliard back in 1week after the sleep study to go over the sleep study results and further management options.  -She was educated to practice good sleep hygiene practices. She  was provided with a good sleep hygiene hand out.  -Denisse was advised to make earlier appointment with my clinic if she develops any worsening of sleep  symptoms. She verbalizes understanding.  -Trinidad Zavala was advised to not to drive any motor vehicles or operate heavy equipment until her sleep symptoms are under good control. Denisse Hilliard verbalizes understanding.  -She was advised to loose weight by controlling diet and doing exercise once cleared by her family physician.   - Mehnaz Castelan was educated about my impression and plan. She verbalizes understanding.      -I personally reviewed updated the Past medical hx, Past surgical hx,Social hx, Family hx, Medications, Allergies in the discrete data section of the patient chart along with labs, Pulmonary medicine,Sleep medicine related, Pathological, Microbiological and Radiological investigations.

## 2023-02-10 ENCOUNTER — INITIAL CONSULT (OUTPATIENT)
Dept: PULMONOLOGY | Age: 38
End: 2023-02-10
Payer: COMMERCIAL

## 2023-02-10 VITALS
DIASTOLIC BLOOD PRESSURE: 72 MMHG | HEART RATE: 102 BPM | OXYGEN SATURATION: 98 % | SYSTOLIC BLOOD PRESSURE: 118 MMHG | HEIGHT: 67 IN | BODY MASS INDEX: 25.8 KG/M2 | TEMPERATURE: 98.3 F | WEIGHT: 164.4 LBS

## 2023-02-10 DIAGNOSIS — G47.30 SLEEP APNEA, UNSPECIFIED TYPE: ICD-10-CM

## 2023-02-10 DIAGNOSIS — G47.00 INSOMNIA, UNSPECIFIED TYPE: Primary | ICD-10-CM

## 2023-02-10 PROCEDURE — 99204 OFFICE O/P NEW MOD 45 MIN: CPT | Performed by: INTERNAL MEDICINE

## 2023-02-10 PROCEDURE — G8417 CALC BMI ABV UP PARAM F/U: HCPCS | Performed by: INTERNAL MEDICINE

## 2023-02-10 PROCEDURE — 4004F PT TOBACCO SCREEN RCVD TLK: CPT | Performed by: INTERNAL MEDICINE

## 2023-02-10 PROCEDURE — G8484 FLU IMMUNIZE NO ADMIN: HCPCS | Performed by: INTERNAL MEDICINE

## 2023-02-10 PROCEDURE — G8427 DOCREV CUR MEDS BY ELIG CLIN: HCPCS | Performed by: INTERNAL MEDICINE

## 2023-02-10 RX ORDER — LANOLIN ALCOHOL/MO/W.PET/CERES
CREAM (GRAM) TOPICAL
Qty: 30 TABLET | Refills: 2 | Status: SHIPPED | OUTPATIENT
Start: 2023-02-10

## 2023-02-10 NOTE — PATIENT INSTRUCTIONS
Keep your bedroom quiet and dark. Keep temp no higher than 68-70 degress. Don't watch TV/phone/computer in the bedroom. Can have light music to calm down and relax the brain. Try meditations. Don't do exercise or stimulating things before going to sleep. Minimize caffeine, try to avoid caffeine after Noon. Use 3 mg melatonin at 6-7pm to go to bed at 10-11pm. The point is to take melatonin 3 hours before your ideal bed time to regulate your sleep wake schedule. This can be difficult since you have variety of shifts. But try to do this daily. Try to expose yourself to bright light in the mornings.

## 2023-02-10 NOTE — PROGRESS NOTES
[place] [facility] and [facility] initial consultation note    Adilene Molina                                                Chief complaint: Adilene Molina is a 40 y. o.oldfemale came for further evaluation regarding her insomnia and ?sleep apnea  with referral from Ms. Janet Resendiz, APRN - CNP. [place]:    Dogs at home. Only taking tylenol, albuterol, and flonase currently. Has not picked up mirtazepine and does not take propranolol currently. Reports chronic fatigue but no excessive daytime sleepiness per epworth scale. The fatigue has been for the past 4-5 years. Drinks 2 cups of coffee, 1-2 cans of sodas per day. Around 30 beers / week currently. 1-2 packs of cigarettes daily. Medical marijuana - vaping 1-2 puffs / day for anxiety. Sleep/Wake schedule:  Usual time to go to bed during the work/regular day of week: 9-10pm  Usual time to wake up during the work//regular day of week: 6am  Over the weekends her sleep schedule:  [x]phase delayed. She usually falls a sleep in less than:  2-3 hours  She takes naps: No. Rarely. Number of naps per week: Almost never. The naps were reported as refreshing: No    Sleep Hygiene:  Is the temperature and evironment in her bed room is acceptable to her: Yes. She watches Television in her bed room: Yes. She read books, study, pay bills etc in the bed: No.   Frequency She wake up during night/sleep: 3-4 times - half hour to an hour before falling asleep  Majority of nocturnal awakenings are for urination: No.   Difficulty in falling back to sleep after nocturnal awakenings: No  .  Do you drink coffee: Yes. Do you drink caffeinated beverages i.e sodas: Yes. Do you drink tea: Yes. Do you drink alcoholic beverages: Yes.     History of recreational drug use: Yes    Social History     Tobacco Use    Smoking status: Every Day     Packs/day: 2.00     Years: 16.00     Pack years: 32.00     Types: Cigarettes     Start date: 2004    Smokeless tobacco: Never   Vaping Use    Vaping Use: Former    Substances: Occasionally   Substance Use Topics    Alcohol use: Yes     Alcohol/week: 1.0 standard drink     Types: 1 Glasses of wine per week     Comment: occasionally    Drug use: Yes     Frequency: 7.0 times per week     Types: Marijuana [place] [place])     Comment: past history of polysubstance abuse: cannabis, crack, cociane       Sleep apnea symptoms:  Noticed to have loud snoring:Yes. Noted by her family member- Boyfriend, ex-. Witnessed apneas during sleep noticed: Yes. Noted by her family member- boyfriend  History of choking and gasping sensation at night time: Yes. History of headaches in the morning:Yes. History of dry mouth in the morning: Yes. History of palpitations during night time/nocturnal awakenings: No.   History of sweating during night time/nocturnal awakenings: Yes. General:  History of head injury in the past: Yes. Concussion in 2004  History of seizures: No  Rest less legs syndrome symptoms:NO  History suggestive of periodic limb movements during sleep: NO  History suggestive of hypnagogic hallucinations: NO  History suggestive of hypnopompic hallucinations: NO  History suggestive of sleep talking: Yes. History suggestive of sleep walking:NO  History suggestive of bruxism: Yes   [x] Uses mouth guard but pulls it out at night unknowingly. History suggestive of cataplexy: NO  History suggestive of sleep paralysis: NO    Family history of sleep disorders:  Family history of obstructive sleep apnea: Yes. Father has CPAP  Family history of Narcolepsy: No.   Family history of Rest less legs syndrome : Yes.      History regarding old sleep studies:  Prior history of sleep study: No.  Using CPAP device: No.  Currently using home Oxygen: NO.       Patient considerations:  Is the patient is ambulatory: Yes  Para/Quadriplegic: NO  Hearing deficit : NO  Claustrophobic: NO  MDD : Yes  Blind: NO  Incontinent: NO  Para/Quadraplegi: NO.   Need transportation to and from Sleep Center:NO    Mallampati II  Neck Circumference 13.5 inches    Social History:  Social History     Tobacco Use    Smoking status: Every Day     Packs/day: 2.00     Years: 16.00     Pack years: 32.00     Types: Cigarettes     Start date: 2004    Smokeless tobacco: Never   Vaping Use    Vaping Use: Former    Substances: Occasionally   Substance Use Topics    Alcohol use: Yes     Alcohol/week: 1.0 standard drink     Types: 1 Glasses of wine per week     Comment: occasionally    Drug use: Yes     Frequency: 7.0 times per week     Types: Marijuana (Weed)     Comment: past history of polysubstance abuse: cannabis, crack, cociane     She is currently working: Yes. She usually goes [facility] at[facility] [address]  She completes her work at: 3:30pm  Sometimes has other shifts:  3pm-11:30pm  10:30pm to 7:00am    Past Medical History:   Diagnosis Date    Abnormal Pap smear and cervical HPV (human papillomavirus)     Anxiety     Asthma     Depression     PTSD (post-traumatic stress disorder)     Smoker     Substance abuse ([facility] [address]     history of    Tonsillitis     recurrent       Past Surgical History:   Procedure Laterality Date    DENTAL SURGERY      FOOT SURGERY Right 08/26/14    HYSTERECTOMY ABDOMINAL N/A 8/5/2019    ROBOTIC HYSTER W/ BS performed by Damien Hunt MD at [address] [place] [address]  1/2012       Allergies   Allergen Reactions    Augmentin [Amoxicillin-Pot Clavulanate] Swelling    Bee Venom Hives    Nicotine Swelling     patch    Bee Pollen Swelling and Rash       Current Outpatient Medications   Medication Sig Dispense Refill    melatonin (RA MELATONIN) 3 MG TABS tablet Take 3 hours before your ideal sleep time.  30 tablet 2    fluticasone (FLONASE) 50 MCG/ACT nasal spray 1 spray by Each Nostril route daily 32 g 1    albuterol sulfate HFA (PROVENTIL HFA) 108 (90 Base) MCG/ACT inhaler Inhale 2 puffs into the lungs every 6 hours as needed for Wheezing 1 Inhaler 3    acetaminophen (TYLENOL) 325 MG tablet Take 650 mg by mouth every 6 hours as needed for Pain       No current facility-administered medications for this visit. Family History   Problem Relation Age of Onset    Cancer Mother         ovarian in mother  , paternal grandmother- breast cancer    Alcohol Abuse Other          family        Review of Systems:   General/Constitutional: No recent loss of weight or appetite changes. No fever or chills. HENT: Nasal congestion. Eyes: Negative. Upper respiratory tract: No post nasal drip. Lower respiratory tract/ lungs: No cough or sputum production. No hemoptysis. Cardiovascular: No palpitations or chest pain. Gastrointestinal: No nausea or vomiting. Neurological: No focal neurologiacal weakness. Extremities: No edema. Musculoskeletal: No complaints. Genitourinary: No complaints. Hematological: Negative. Psychiatric/Behavioral: Anxiety  Skin: No itching. /72 (Site: Left Upper Arm, Position: Sitting, Cuff Size: Large Adult)   Pulse (!) 102   Temp 98.3 °F (36.8 °C)   Ht 5' 7\" (1.702 m)   Wt 164 lb 6.4 oz (74.6 kg)   LMP 08/18/2019   SpO2 98% Comment: room air at rest  BMI 25.75 kg/m²   BMI:  Body mass index is 25.75 kg/m². Physical Exam:   Nursing note and vitals reviewed. Constitutional: Patient appears moderately built and moderately nourished. No distress. Patient is oriented to person, place, and time. HENT: Mallampati II, 13.5 inch neck circumference, and 2+ tonsils on left. Head: Normocephalic and atraumatic. Right Ear: External ear normal.   Left Ear: External ear normal.   Mouth/Throat: Oropharynx is clear and moist.  No oral thrush. Eyes: Conjunctivae are normal. Pupils are equal, round, and reactive to light. No scleral icterus. Neck: Neck supple. No JVD present. No tracheal deviation present. Cardiovascular: Normal rate, regular rhythm, normal heart sounds.  No murmur heard. Pulmonary/Chest: Effort normal and breath sounds normal. No stridor. No respiratory distress. No wheezes. Bilateral LE crackles that improved after  coughing. No rales. Patient exhibits no tenderness. Abdominal: Soft. Patient exhibits no distension. No tenderness. Musculoskeletal: Normal range of motion. Extremities: Patient exhibits no edema and no tenderness. Lymphadenopathy:  No cervical adenopathy. Neurological: Patient is alert and oriented to person, place, and time. Skin: Skin is warm and dry. Patient is not diaphoretic. Spider angiomas on chest  Psychiatric: Patient  has a normal mood and affect. Patient behavior is normal.       Diagnostic Data:  None related sleep. Assessment:  -Snoring with witnessed apneas,frequent nocturnal awakenings.  -evaluate for obstructive sleep apnea. -Inadequate sleep hygiene. - Watches TV, using caffeine, tobacco, and alcohol. Past Medical History:   Diagnosis Date    Abnormal Pap smear and cervical HPV (human papillomavirus)     Anxiety     Asthma     Depression     PTSD (post-traumatic stress disorder)     Smoker     Substance abuse ([facility] [address]     history of    Tonsillitis     recurrent       Recommendations/Plan:  -Will schedule patient for polysomnogram in the sleep lab. - Will start 3 mg melatonin 3 hours before ideal bedtime to regulat sleep schedule.  -I had a discussion with patient regarding avialable treatment options for her sleep disorder breathing including but not limited to CPAP titration in the sleep lab Vs.Dental appliance placement with referral to a local dentist Vs other available surgical options including Uvulopalatopharyngoplasty, maxillomandibular ostomy,Inspire device placement and tracheostomy as last option. At the end of discussion, she is not decided on her treatment if she found to have obstructive sleep apnea at this time. - Will refer to Dr. Prashanth Perrin for CBT-I.  Patient advised to practice sleep hygiene for 1 month before her sleep study.  -We will see Justine Vann back in 1 week after the sleep study to go over the sleep study results and further management options.  -She was educated to practice good sleep hygiene practices. She  was provided with a good sleep hygiene hand out in the after visit summary:  - Keep your bedroom quiet and dark. - Keep temp no higher than 68-70 degress. - Don't watch TV/phone/computer in the bedroom. - Can have light music to calm down and relax the brain. Try meditations. - Don't exercise or stimulating activities 2-3 hours before going to sleep. - Minimize caffeine, try to avoid caffeine after Noon. - Use 3 mg melatonin at 6-7pm to go to bed at 10-11pm to regulate sleep schedule  - Try to expose yourself to bright light in the mornings.  -Denisse was advised to make earlier appointment with my clinic if she develops any worsening of sleep symptoms. She verbalizes understanding.  - Justine Vann was educated about my impression and plan. She verbalizes understanding.    -I personally reviewed updated the Past medical hx, Past surgical hx,Social hx, Family hx, Medications, Allergies in the discrete data section of the patient chart along with labs, Pulmonary medicine,Sleep medicine related, Pathological, Microbiological and Radiological investigations.

## 2023-02-10 NOTE — LETTER
[address] [facility]  [address]  Phone: [phone]  Fax: [phone]    Stoney Mcdermott MD        February 10, 2023     Patient: Ayesha Calloway   YOB: 1985   Date of Visit: 2/10/2023       To Whom It May Concern:    Please excuse Eliane Learn from work today. If you have any questions or concerns, please don't hesitate to call.     Sincerely,        Stoney Mcdermott MD

## 2023-02-10 NOTE — PROGRESS NOTES
Chief Complaint: New sleep consult no prior studies    Mallampati airway Class:3  Neck Circumference:.13.5 Inches    Schneider sleepiness score 2/10/23: 8  Sleep apnea quality of life questionnaire:14

## 2023-02-10 NOTE — PROGRESS NOTES
[facility] and [facility] initial consultation note    Edgardo Alcala                                                Chief complaint: Edgardo Alcala is a 40 y. o.oldfemale came for further evaluation regarding her insomnia and ?sleep apnea  with referral from Ms. Justo Bell, APRN - CNP. [place]:    Dogs at home. Only taking tylenol, albuterol, and flonase currently. Has not picked up mirtazepine and does not take propranolol currently. Reports chronic fatigue but no excessive daytime sleepiness per epworth scale. The fatigue has been for the past 4-5 years. Drinks 2 cups of coffee, 1-2 cans of sodas per day. Around 30 beers / week currently. 1-2 packs of cigarettes daily. Medical marijuana - vaping 1-2 puffs / day for anxiety. Sleep/Wake schedule:  Usual time to go to bed during the work/regular day of week: 9-10pm  Usual time to wake up during the work//regular day of week: 6am  Over the weekends her sleep schedule:  [x]phase delayed. She usually falls a sleep in less than:  2-3 hours  She takes naps: No. Rarely. Number of naps per week: Almost never. The naps were reported as refreshing: No    Sleep Hygiene:  Is the temperature and evironment in her bed room is acceptable to her: Yes. She watches Television in her bed room: Yes. She read books, study, pay bills etc in the bed: No.   Frequency She wake up during night/sleep: 3-4 times - half hour to an hour before falling asleep  Majority of nocturnal awakenings are for urination: No.   Difficulty in falling back to sleep after nocturnal awakenings: No  .  Do you drink coffee: Yes. Do you drink caffeinated beverages i.e sodas: Yes. Do you drink tea: Yes. Do you drink alcoholic beverages: Yes.     History of recreational drug use: Yes    Social History     Tobacco Use    Smoking status: Every Day     Packs/day: 2.00     Years: 16.00     Pack years: 32.00     Types: Cigarettes     Start date: 2004    Smokeless tobacco: Never   Vaping Use    Vaping Use: Former    Substances: Occasionally   Substance Use Topics    Alcohol use: Yes     Alcohol/week: 1.0 standard drink     Types: 1 Glasses of wine per week     Comment: occasionally    Drug use: Yes     Frequency: 10.0 times per week     Types: Marijuana Arthur Mariam)     Comment: Vaping cannabinoids. Not smoking marijuana. tried cocaine when she was very young. none since. Sleep apnea symptoms:  Noticed to have loud snoring:Yes. Noted by her family member- Boyfriend, ex-. Witnessed apneas during sleep noticed: Yes. Noted by her family member- boyfriend  History of choking and gasping sensation at night time: Yes. History of headaches in the morning:Yes. History of dry mouth in the morning: Yes. History of palpitations during night time/nocturnal awakenings: No.   History of sweating during night time/nocturnal awakenings: Yes. General:  History of head injury in the past: Yes. Concussion in 2004  History of seizures: No  Rest less legs syndrome symptoms:NO  History suggestive of periodic limb movements during sleep: NO  History suggestive of hypnagogic hallucinations: NO  History suggestive of hypnopompic hallucinations: NO  History suggestive of sleep talking: Yes. History suggestive of sleep walking:NO  History suggestive of bruxism: Yes   [x] Uses mouth guard but pulls it out at night unknowingly. History suggestive of cataplexy: NO  History suggestive of sleep paralysis: NO    Family history of sleep disorders:  Family history of obstructive sleep apnea: Yes. Father has CPAP  Family history of Narcolepsy: No.   Family history of Rest less legs syndrome : Yes.      History regarding old sleep studies:  Prior history of sleep study: No.  Using CPAP device: No.  Currently using home Oxygen: NO.       Patient considerations:  Is the patient is ambulatory: Yes  Para/Quadriplegic: NO  Hearing deficit : NO  Claustrophobic: NO  MDD : Yes  Blind: NO  Incontinent: NO  Para/Quadraplegi: NO.   Need transportation to and from Sleep Center:NO    Mallampati II  Neck Circumference 13.5 inches    Social History:  Social History     Tobacco Use    Smoking status: Every Day     Packs/day: 2.00     Years: 16.00     Pack years: 32.00     Types: Cigarettes     Start date: 2004    Smokeless tobacco: Never   Vaping Use    Vaping Use: Former    Substances: Occasionally   Substance Use Topics    Alcohol use: Yes     Alcohol/week: 1.0 standard drink     Types: 1 Glasses of wine per week     Comment: occasionally    Drug use: Yes     Frequency: 10.0 times per week     Types: Marijuana Kiana Odroñez     Comment: Vaping cannabinoids. Not smoking marijuana. tried cocaine when she was very young. none since. She is currently working: Yes. She usually goes to her work at: [address] [facility]  She completes her work at: 3:30pm  Sometimes has other shifts:  3pm-11:30pm  10:30pm to 7:00am    Past Medical History:   Diagnosis Date    Abnormal Pap smear and cervical HPV (human papillomavirus)     Anxiety     Asthma     Depression     PTSD (post-traumatic stress disorder)     Smoker     Substance abuse ([facility] [address]     history of    Tonsillitis     recurrent       Past Surgical History:   Procedure Laterality Date    DENTAL SURGERY      FOOT SURGERY Right 08/26/14    HYSTERECTOMY ABDOMINAL N/A 8/5/2019    ROBOTIC HYSTER W/ BS performed by Gerson Romano MD at [address]  1/2012       Allergies   Allergen Reactions    Augmentin [Amoxicillin-Pot Clavulanate] Swelling    Bee Venom Hives    Nicotine Swelling     patch    Bee Pollen Swelling and Rash       Current Outpatient Medications   Medication Sig Dispense Refill    melatonin (RA MELATONIN) 3 MG TABS tablet Take 3 hours before your ideal sleep time.  30 tablet 2    fluticasone (FLONASE) 50 MCG/ACT nasal spray 1 spray by Each Nostril route daily 32 g 1    albuterol sulfate HFA (PROVENTIL HFA) 108 (90 Base) MCG/ACT inhaler Inhale 2 puffs into the lungs every 6 hours as needed for Wheezing 1 Inhaler 3    acetaminophen (TYLENOL) 325 MG tablet Take 650 mg by mouth every 6 hours as needed for Pain       No current facility-administered medications for this visit. Family History   Problem Relation Age of Onset    Cancer Mother         ovarian in mother  , paternal grandmother- breast cancer    Alcohol Abuse Other          family        Review of Systems:   General/Constitutional: No recent loss of weight or appetite changes. No fever or chills. HENT: Nasal congestion. Eyes: Negative. Upper respiratory tract: No post nasal drip. Lower respiratory tract/ lungs: No cough or sputum production. No hemoptysis. Cardiovascular: No palpitations or chest pain. Gastrointestinal: No nausea or vomiting. Neurological: No focal neurologiacal weakness. Extremities: No edema. Musculoskeletal: No complaints. Genitourinary: No complaints. Hematological: Negative. Psychiatric/Behavioral: Anxiety  Skin: No itching. /72 (Site: Left Upper Arm, Position: Sitting, Cuff Size: Large Adult)   Pulse (!) 102   Temp 98.3 °F (36.8 °C)   Ht 5' 7\" (1.702 m)   Wt 164 lb 6.4 oz (74.6 kg)   LMP 08/18/2019   SpO2 98% Comment: room air at rest  BMI 25.75 kg/m²   BMI:  Body mass index is 25.75 kg/m². Mallampati Score: 3  Neck Circumference:13.5  De Beque sleepiness score given 2/10/23 was: 8  ( On further questioning she gives a history of hypersomnia ( Excessive daytime sleepiness and tiredness). No reported motor vehicle accidents due to her sleepiness). Sleep apnea Quality of Life Questionnaire Score on 2/10/23 was: 14    Physical Exam:   Nursing note and vitals reviewed. Constitutional: Patient appears moderately built and moderately nourished. No distress. Patient is oriented to person, place, and time. HENT:2+ tonsils on left. Head: Normocephalic and atraumatic.    Right Ear: External ear normal.   Left Ear: External ear normal.   Mouth/Throat: Oropharynx is clear and moist.  No oral thrush. Eyes: Conjunctivae are normal. Pupils are equal, round, and reactive to light. No scleral icterus. Neck: Neck supple. No JVD present. No tracheal deviation present. Cardiovascular: Normal rate, regular rhythm, normal heart sounds. No murmur heard. Pulmonary/Chest: Effort normal and breath sounds normal. No stridor. No respiratory distress. No wheezes. Bilateral LE crackles that improved after  coughing. No rales. Patient exhibits no tenderness. Abdominal: Soft. Patient exhibits no distension. No tenderness. Musculoskeletal: Normal range of motion. Extremities: Patient exhibits no edema and no tenderness. Lymphadenopathy:  No cervical adenopathy. Neurological: Patient is alert and oriented to person, place, and time. Skin: Skin is warm and dry. Patient is not diaphoretic. Spider angiomas on chest  Psychiatric: Patient  has a normal mood and affect. Patient behavior is normal.       Diagnostic Data:  None related sleep. Assessment:  -Snoring with witnessed apneas,frequent nocturnal awakenings and excessive daytime sleepiness-need to evaluate for obstructive sleep apnea  -Inadequate sleep hygiene. - Watches TV, using caffeine, tobacco, and alcohol.   -Chronic sleep onset and maintenance insomnia. -Hx active marijuana vaping. She is on medical marijuana. She used to snort cocaine in the past.  Patient quit snorting cocaine several years back. -Depression on treatment and medications. She is following with the medicine psychiatrist.  However, she is not taking any antidepressant medications.  -Hx of PTSD. -Chronic history of alcohol use. She was never diagnosed with alcoholic cirrhosis so far. Recommendations/Plan:  -Will schedule patient for polysomnogram in the sleep lab. -Start patient on Melatonin 3mg PO daily at bed time PRN.  She was instructed to not to drive any motor vehicles or operate heavy equipment after taking the pill until sleep symptoms are under control. She was detailed about mechanism of action of drug along with associated side effects. She agreed to take the risk and medication. She verbalizes understanding.  -I had a discussion with patient regarding avialable treatment options for her sleep disorder breathing including but not limited to CPAP titration in the sleep lab Vs.Dental appliance placement with referral to a local dentist Vs other available surgical options including Uvulopalatopharyngoplasty, maxillomandibular ostomy,Inspire device placement and tracheostomy as last option. At the end of discussion, she is not decided on her treatment if she found to have obstructive sleep apnea at this time. - Will refer to Dr. Morgan Candelaria for CBT-I. Patient advised to practice sleep hygiene for 1 month before her sleep study.  -We will see Catrina Seen back in 1 week after the sleep study to go over the sleep study results and further management options.  -She was educated to practice good sleep hygiene practices. She  was provided with a good sleep hygiene hand out in the after visit summary:  - Keep your bedroom quiet and dark. - Keep temp no higher than 68-70 degress. - Don't watch TV/phone/computer in the bedroom. - Can have light music to calm down and relax the brain. Try meditations. - Don't exercise or stimulating activities 2-3 hours before going to sleep. - Minimize caffeine, try to avoid caffeine after Noon. - Use 3 mg melatonin at 6-7pm to go to bed at 10-11pm to regulate sleep schedule  - Try to expose yourself to bright light in the mornings.  -Denisse was advised to make earlier appointment with my clinic if she develops any worsening of sleep symptoms. She verbalizes understanding.  - Catrina Seen was educated about my impression and plan.  She verbalizes understanding.    -I personally reviewed updated the Past medical hx, Past surgical hx,Social hx, Family hx, Medications, Allergies in the discrete data section of the patient chart along with labs, Pulmonary medicine,Sleep medicine related, Pathological, Microbiological and Radiological investigations. Addendum by Dr. Omer Diaz MD:  Patient seen by me independently including key components of medical care. Face to face evaluation and examination was performed. Case discussed with Porsha Antonio MD - Family medicine resident physician. Italicized font, if present,  represents changes to the note made by me. More than 50% of the encounter time involved with patient care by the Pulmonary & Critical care service team spent by me. Please see my modifications mentioned below:    Plan:  -She was educated about sleep restriction therapy and advised to practice to improve her insomnia. -She was educated about stimulus control therapy and advise to practice to improve her insomnia. - Schedule patient for Cognitive behavioral therapy consult with Dr. Jewell Fields as soon as possible for further evaluation regarding her insomnia and cognitive behavioral therapy.  -She was instructed to not to drive any motor vehicles or operate heavy equipment if she feels sleepy. -Schedule patient for nocturnal polysomnogram (Sleep study) at [facility] sleep lab once her sleep hygiene got better. Patient educated to not to drive any motor vehicles or operate heavy equipment until sleep symptoms are under control. Patient verbalizes understanding. Patient to follow with my clinic at [address] [place] [address] 1week after sleep study.  -Jessa Calles was advised to make earlier appointment with my clinic if she develops any worsening of sleep symptoms. She verbalizes understanding.  -Chinyere De Leon was educated about my impression and plan. She verbalizes understanding.     Electronically signed by   Ferny Rojas MD on 2/10/2023 at 12:43 PM

## 2023-02-13 ENCOUNTER — TELEPHONE (OUTPATIENT)
Dept: PULMONOLOGY | Age: 38
End: 2023-02-13

## 2023-02-13 NOTE — TELEPHONE ENCOUNTER
Pt called wanting to get an updated return to work slip dated for 2/11/23 with no restrictions. She stated that she would like it emailed to her at [email or web address]. Please advise. Thanks!

## 2023-02-14 NOTE — TELEPHONE ENCOUNTER
Called the pt this morning. .. Pt has received a copy of the letter in epic and spoke with Dr. Trudy Walter and has what she needs for work.

## 2023-03-06 ENCOUNTER — HOSPITAL ENCOUNTER (OUTPATIENT)
Dept: SLEEP CENTER | Age: 38
Discharge: HOME | End: 2023-03-08
Payer: COMMERCIAL

## 2023-03-06 DIAGNOSIS — G47.30 SLEEP APNEA, UNSPECIFIED TYPE: ICD-10-CM

## 2023-03-06 PROCEDURE — 95810 POLYSOM 6/> YRS 4/> PARAM: CPT

## 2023-03-07 LAB — STATUS: NORMAL

## 2023-03-08 NOTE — PROGRESS NOTES
[address] [address]                               SLEEP STUDY REPORT    PATIENT NAME: SHUKRI TOMPKINS                   :        1985  MED REC NO:   [id number]                           ROOM:  ACCOUNT NO:   [id number]                           ADMIT DATE: 2023  PROVIDER:     Maverick Cleveland MD    DATE OF STUDY:  2023    REFERRING PROVIDER:  Jean Marie White CNP    The patient's height is 67 inches, weight is 164 pounds with a BMI of  25.7.    HISTORY:  The patient is a 37-year-old female who was initially  evaluated by me on 02/10/2023.  The patient gave a history of snoring  with witnessed apneas and frequent nocturnal awakenings.  The patient is  scheduled for a sleep study to evaluate for sleep apnea.    METHODS:  The patient underwent digital polysomnography in compliance  with the standards and specifications from the AASM Manual including the  simultaneous recording of 3 EEG channels (F4-M1, C4-M1, and O2-M1 with  back up electrodes F3-M2, C3-M2, and O1-M2), 2 EOG channels (E1-M2, and  E2-M1,), EMG (chin, left & right leg), EKG, Nonin pulse oximetry with   less than 2 second averaging time, body position, airflow recorded by  oral-nasal thermal sensor and nasal air pressure transducer, plus  respiratory effort recorded by calibrated respiratory inductance  plethysmography (RIP), flow volume loop, sound and video.  Sleep staging  and scoring followed the standard put forth by the American Academy of  Sleep Medicine and utilized the 1A obstructive hypopnea event  desaturation of 3 percent or greater.    The study was scored by using AASM 1A hypopnea rule (3% desaturation  criteria).    INTERPRETATION:  This is a baseline sleep study, and the study was  performed on 2023.  The study as started at 10:13 p.m. and was  terminated at 04:09 a.m. with total recording time of 355.5 minutes,  sleep  period time was 354.4 minutes, and total sleep time was 301  minutes, overall sleep efficiency was 84.7% of total sleep time. The  sleep onset latency was 1.1 minutes, and wake after sleep onset was 53.4  minutes. REM sleep latency was 134 minutes. SLEEP STAGING AND DISTRIBUTION SUMMARY:  Revealed the patient spent 23  minutes in stage I consisting of 7.6% of total sleep time, 223.5 minutes  in stage II consisting of 74.3%, 20 minutes in stage III consisting of  6.6%, 34.5 minutes in REM sleep consisting of 11.5% of total sleep time. RESPIRATORY EVENT ANALYSIS:  Revealed the patient had a total of 0  apneas. The patient had a total of 2 hypopneas, the two are obstructive  in nature. The total number of apneas and hypopneas recorded during the  study were 2 with an apnea-hypopnea index of 0.4. The patient's REM  sleep apnea-hypopnea index was 1.7. The patient's hypopneas was scored  by using AASM 1A hypopnea rule (3% desaturation criteria). POSITION ANALYSIS:  Revealed the patient spent 99 minutes in supine  position and 4.5 minutes in left lateral position, 197.5 minutes in  right lateral position with a supine apnea-hypopnea index of 0 whereas  left lateral position apnea-hypopnea index was 0. PERIODIC LIMB MOVEMENT ANALYSIS:  Revealed the patient did not have any  periodic limb movements. The patient had a total of 42 spontaneous  arousals with a spontaneous arousal index of 8.4. OXYGEN SATURATION MONITORING:  Revealed the patient had a maximum oxygen  desaturation to 89% with a mean oxygen saturation of 92.3%. During the  entire sleep study, the patient's oxygen saturation remained more than  88%. EKG MONITORING:  Revealed normal sinus rhythm. IMPRESSION:  1. No clinically significant obstructive sleep apnea. The sleep study  was scored by using an AASM 1A hypopnea rule (3% desaturation criteria).   2.  Decreased and delayed REM sleep limiting the interpretation of the  sleep study.  3.  The patient is found to have total of 42 spontaneous arousals with  the spontaneous arousal index of 8.4.  4.  Depression, on treatment with medications.  5.  History of PTSD.  6.  Chronic history of alcohol use in the past.    RECOMMENDATIONS:  The patient should be scheduled for a follow up with  my clinic as soon as possible to discuss about the sleep study findings  for further management.    Thanks to Jean Marie White CNP, for giving me this opportunity to  participate in the care of this pleasant lady.        STEVEN MELGOZA MD    D: 03/07/2023 16:14:01       T: 03/08/2023 8:36:37     [email or web address]  Job#: [id number]     Doc#: [id number]    CC:

## 2023-03-10 DIAGNOSIS — G47.00 INSOMNIA, UNSPECIFIED TYPE: Primary | ICD-10-CM

## 2023-03-10 RX ORDER — LANOLIN ALCOHOL/MO/W.PET/CERES
3 CREAM (GRAM) TOPICAL NIGHTLY PRN
Qty: 30 TABLET | Refills: 5 | Status: SHIPPED | OUTPATIENT
Start: 2023-03-10 | End: 2023-03-17 | Stop reason: SDUPTHER

## 2023-03-17 ENCOUNTER — OFFICE VISIT (OUTPATIENT)
Dept: PULMONOLOGY | Age: 38
End: 2023-03-17
Payer: COMMERCIAL

## 2023-03-17 VITALS
OXYGEN SATURATION: 97 % | WEIGHT: 165.6 LBS | BODY MASS INDEX: 25.99 KG/M2 | TEMPERATURE: 98.9 F | SYSTOLIC BLOOD PRESSURE: 118 MMHG | HEIGHT: 67 IN | DIASTOLIC BLOOD PRESSURE: 62 MMHG | HEART RATE: 77 BPM

## 2023-03-17 DIAGNOSIS — R53.82 CHRONIC FATIGUE: ICD-10-CM

## 2023-03-17 DIAGNOSIS — G47.00 INSOMNIA, UNSPECIFIED TYPE: Primary | ICD-10-CM

## 2023-03-17 PROCEDURE — G8427 DOCREV CUR MEDS BY ELIG CLIN: HCPCS | Performed by: NURSE PRACTITIONER

## 2023-03-17 PROCEDURE — G8417 CALC BMI ABV UP PARAM F/U: HCPCS | Performed by: NURSE PRACTITIONER

## 2023-03-17 PROCEDURE — G8484 FLU IMMUNIZE NO ADMIN: HCPCS | Performed by: NURSE PRACTITIONER

## 2023-03-17 PROCEDURE — 99213 OFFICE O/P EST LOW 20 MIN: CPT | Performed by: NURSE PRACTITIONER

## 2023-03-17 PROCEDURE — 4004F PT TOBACCO SCREEN RCVD TLK: CPT | Performed by: NURSE PRACTITIONER

## 2023-03-17 RX ORDER — LANOLIN ALCOHOL/MO/W.PET/CERES
3 CREAM (GRAM) TOPICAL NIGHTLY PRN
Qty: 30 TABLET | Refills: 5 | Status: SHIPPED | OUTPATIENT
Start: 2023-03-17 | End: 2023-09-13

## 2023-03-17 ASSESSMENT — ENCOUNTER SYMPTOMS
RESPIRATORY NEGATIVE: 1
GASTROINTESTINAL NEGATIVE: 1
SHORTNESS OF BREATH: 0
ALLERGIC/IMMUNOLOGIC NEGATIVE: 1
COUGH: 0
EYES NEGATIVE: 1
WHEEZING: 0

## 2023-03-17 NOTE — PROGRESS NOTES
[facility]    Haim Leung, 40 y.o.  [id number]      Patient of [facility]  Nurses Notes   PSG results  Study Results  Initial Study Date -  3/6/23  AHI -  0.4    TotalEvents - 2  (Apneas  0  Hypopneas 2  Central  0)  LM w/Arousals - 0.0  Sleep Efficiency - 84.7 % (Total Sleep Time - 301.0 min)  Time with Sats below 88% - 0.0 min  Interval History       Haim Leung is a 40 y.o. old female who comes in to review the results of her recent sleep study, to answer questions and to explore options for treatment. she continues to have symptoms of snoring, feels sleepy during the day  PSG testing done with no significant RAHEEM seen   No significant nocturnal hypoxia seen  No PLMD noted  Started on Melatonin last visit per Dr. Yvrose Claros and referral placed for CBT-I - 1st appt on 3/31  Melatonin helps some nights  Getting around 4-6 hours of sleep tonight   Followed with psychiatry for awhile   Used trazodone in the past with no much success   Hx of PTSD  Family ETOH issues     DATE OF STUDY:  03/06/2023   SLEEP STUDY REPORT     INTERPRETATION:  This is a baseline sleep study, and the study was  performed on 03/06/2023. The study as started at 10:13 p.m. and was  terminated at 04:09 a.m. with total recording time of 355.5 minutes,  sleep period time was 354.4 minutes, and total sleep time was 301  minutes, overall sleep efficiency was 84.7% of total sleep time. The  sleep onset latency was 1.1 minutes, and wake after sleep onset was 53.4  minutes. REM sleep latency was 134 minutes. SLEEP STAGING AND DISTRIBUTION SUMMARY:  Revealed the patient spent 23  minutes in stage I consisting of 7.6% of total sleep time, 223.5 minutes  in stage II consisting of 74.3%, 20 minutes in stage III consisting of  6.6%, 34.5 minutes in REM sleep consisting of 11.5% of total sleep time. RESPIRATORY EVENT ANALYSIS:  Revealed the patient had a total of 0  apneas.   The patient had a total of 2 hypopneas, the two are obstructive  in nature. The total number of apneas and hypopneas recorded during the  study were 2 with an apnea-hypopnea index of 0.4. The patient's REM  sleep apnea-hypopnea index was 1.7. The patient's hypopneas was scored  by using AASM 1A hypopnea rule (3% desaturation criteria). POSITION ANALYSIS:  Revealed the patient spent 99 minutes in supine  position and 4.5 minutes in left lateral position, 197.5 minutes in  right lateral position with a supine apnea-hypopnea index of 0 whereas  left lateral position apnea-hypopnea index was 0. PERIODIC LIMB MOVEMENT ANALYSIS:  Revealed the patient did not have any  periodic limb movements. The patient had a total of 42 spontaneous  arousals with a spontaneous arousal index of 8.4. OXYGEN SATURATION MONITORING:  Revealed the patient had a maximum oxygen  desaturation to 89% with a mean oxygen saturation of 92.3%. During the  entire sleep study, the patient's oxygen saturation remained more than  88%. EKG MONITORING:  Revealed normal sinus rhythm. IMPRESSION:  1. No clinically significant obstructive sleep apnea. The sleep study  was scored by using an AASM 1A hypopnea rule (3% desaturation criteria). 2.  Decreased and delayed REM sleep limiting the interpretation of the  sleep study. 3.  The patient is found to have total of 42 spontaneous arousals with  the spontaneous arousal index of 8.4.  4.  Depression, on treatment with medications. 5.  History of PTSD.   6.  Chronic history of alcohol use in the past.         Weight stable / unchanged    Allergies  Allergies   Allergen Reactions    Augmentin [Amoxicillin-Pot Clavulanate] Swelling    Bee Venom Hives    Nicotine Swelling     patch    Bee Pollen Swelling and Rash     Meds  Current Outpatient Medications   Medication Sig Dispense Refill    melatonin 3 MG TABS tablet Take 1 tablet by mouth nightly as needed (Insomnia) 30 tablet 5    fluticasone (FLONASE) 50 MCG/ACT nasal spray 1 spray by Each Nostril route daily 32 g 1    albuterol sulfate HFA (PROVENTIL HFA) 108 (90 Base) MCG/ACT inhaler Inhale 2 puffs into the lungs every 6 hours as needed for Wheezing 1 Inhaler 3    acetaminophen (TYLENOL) 325 MG tablet Take 650 mg by mouth every 6 hours as needed for Pain       No current facility-administered medications for this visit. ROS  Review of Systems   Constitutional:  Positive for fatigue. Negative for chills and fever. HENT: Negative. Negative for congestion. Eyes: Negative. Respiratory: Negative. Negative for cough, shortness of breath and wheezing. Cardiovascular: Negative. Negative for chest pain and leg swelling. Gastrointestinal: Negative. Endocrine: Negative. Genitourinary: Negative. Musculoskeletal: Negative. Allergic/Immunologic: Negative. Neurological: Negative. Hematological: Negative. Psychiatric/Behavioral: Negative. Negative for sleep disturbance. Exam  Vitals -  /62   Pulse 77   Temp 98.9 °F (37.2 °C)   Ht 5' 7\" (1.702 m)   Wt 165 lb 9.6 oz (75.1 kg)   LMP 08/18/2019   SpO2 97%   BMI 25.94 kg/m²    Body mass index is 25.94 kg/m². Oxygen level - Room Air  Physical Exam  Vitals and nursing note reviewed. Constitutional:       Appearance: She is well-developed. HENT:      Head: Normocephalic and atraumatic. Eyes:      Conjunctiva/sclera: Conjunctivae normal.      Pupils: Pupils are equal, round, and reactive to light. Neck:      Vascular: No JVD. Cardiovascular:      Rate and Rhythm: Normal rate and regular rhythm. Heart sounds: Normal heart sounds. No murmur heard. No friction rub. No gallop. Pulmonary:      Effort: Pulmonary effort is normal. No respiratory distress. Breath sounds: Normal breath sounds. No wheezing or rales. Abdominal:      General: Bowel sounds are normal.      Palpations: Abdomen is soft. Musculoskeletal:         General: Normal range of motion. Cervical back: Normal range of motion and neck supple. Skin:     General: Skin is warm and dry. Capillary Refill: Capillary refill takes less than 2 seconds. Neurological:      Mental Status: She is alert and oriented to person, place, and time. Psychiatric:         Behavior: Behavior normal.         Thought Content: Thought content normal.         Judgment: Judgment normal.      Assessment   Diagnosis Orders   1. Insomnia, unspecified type  melatonin 3 MG TABS tablet    CBC with Auto Differential    Comprehensive Metabolic Panel    Hepatic Function Panel    Ferritin      2. Chronic fatigue  CBC with Auto Differential    Comprehensive Metabolic Panel    Hepatic Function Panel    Ferritin         Recommendations    -PSG not consistent with any significant sleep apnea  -Continue Melatonin at night for insomnia discussed using 6-9 mg at night - refilled   -Encouraged to follow through with referral for cognitive behavioral therapy   -Will have CBC w/auto, Hepatic function panel, CMP, and Ferritin level to rule out any other issues that could be causing patients ongoing fatigue   -Good sleep hygiene discussed   -RTC in 1 month after first CBT-I visit with Dr. Tor Marino.        Electronically signed by NACHO Jean Baptiste CNP on 3/17/2023 at 12:11 PM

## 2023-03-31 ENCOUNTER — OFFICE VISIT (OUTPATIENT)
Dept: PSYCHOLOGY | Age: 38
End: 2023-03-31

## 2023-03-31 DIAGNOSIS — F51.04 PSYCHOPHYSIOLOGICAL INSOMNIA: Primary | ICD-10-CM

## 2023-03-31 DIAGNOSIS — F43.10 PTSD (POST-TRAUMATIC STRESS DISORDER): ICD-10-CM

## 2023-03-31 DIAGNOSIS — F33.1 MAJOR DEPRESSIVE DISORDER, RECURRENT EPISODE, MODERATE WITH ANXIOUS DISTRESS (HCC): ICD-10-CM

## 2023-04-04 NOTE — PROGRESS NOTES
[facility]                                         [id number]  4/5/2023   Chief Complaint   Patient presents with    Follow-up     3wk Insomnia f/u after CBT w/Dr. Philomena Mi. States she is still not sleeping well. Patient of Dr. Sarah Ventura    ESS: 0  SAQLI: 25    Presentation:   Briana Sue presents for sleep medicine follow up for her psychophysiological insomnia. Since the last visit Briana Sue has not been doing reasonably well with Melatonin use. Symptoms of insomnia are not improved  Patient is participating with CBT-I with Dr. Philomena iM first appt was 3/17/23- keeping sleep diary presently   Trazodone use in the past with no success   Family hx of ETOH abuse   Previous sleep testing (-) for RAHEEM      Progress History:   Since last visit any new medical issues? No  New ER or hospitlal visits? No  Any new or changes in medicines? No  Any new sleep medicines? No    Sleep issues:  Do you feel better? No  More rested? No   Better concentration? NA  Difficulty falling asleep? yes  Difficulty staying asleep? yes  Snoring?  NA    Past Medical History:   Diagnosis Date    Abnormal Pap smear and cervical HPV (human papillomavirus)     Anxiety     Asthma     Depression     PTSD (post-traumatic stress disorder)     Smoker     Substance abuse ([facility] [address]     history of    Tonsillitis     recurrent       Past Surgical History:   Procedure Laterality Date    DENTAL SURGERY      FOOT SURGERY Right 08/26/14    HYSTERECTOMY ABDOMINAL N/A 8/5/2019    ROBOTIC HYSTER W/ BS performed by Migdalia Talbot MD at [address]   1/2012       Social History     Tobacco Use    Smoking status: Every Day     Packs/day: 2.00     Years: 16.00     Pack years: 32.00     Types: Cigarettes     Start date: 2004    Smokeless tobacco: Never    Tobacco comments:     Not ready to quit smoking at this time   Vaping Use    Vaping Use: Every day    Substances: THC, CBD, Flavoring

## 2023-04-05 ENCOUNTER — OFFICE VISIT (OUTPATIENT)
Dept: PULMONOLOGY | Age: 38
End: 2023-04-05
Payer: COMMERCIAL

## 2023-04-05 VITALS
BODY MASS INDEX: 26.27 KG/M2 | DIASTOLIC BLOOD PRESSURE: 82 MMHG | SYSTOLIC BLOOD PRESSURE: 120 MMHG | HEIGHT: 67 IN | TEMPERATURE: 98.2 F | OXYGEN SATURATION: 98 % | WEIGHT: 167.4 LBS | HEART RATE: 83 BPM

## 2023-04-05 DIAGNOSIS — F33.1 MAJOR DEPRESSIVE DISORDER, RECURRENT EPISODE, MODERATE WITH ANXIOUS DISTRESS (HCC): ICD-10-CM

## 2023-04-05 DIAGNOSIS — F51.04 PSYCHOPHYSIOLOGICAL INSOMNIA: Primary | ICD-10-CM

## 2023-04-05 DIAGNOSIS — R53.82 CHRONIC FATIGUE: ICD-10-CM

## 2023-04-05 DIAGNOSIS — F43.10 PTSD (POST-TRAUMATIC STRESS DISORDER): ICD-10-CM

## 2023-04-05 PROCEDURE — G8417 CALC BMI ABV UP PARAM F/U: HCPCS | Performed by: NURSE PRACTITIONER

## 2023-04-05 PROCEDURE — 99213 OFFICE O/P EST LOW 20 MIN: CPT | Performed by: NURSE PRACTITIONER

## 2023-04-05 PROCEDURE — 4004F PT TOBACCO SCREEN RCVD TLK: CPT | Performed by: NURSE PRACTITIONER

## 2023-04-05 PROCEDURE — G8427 DOCREV CUR MEDS BY ELIG CLIN: HCPCS | Performed by: NURSE PRACTITIONER

## 2023-04-05 RX ORDER — QUETIAPINE FUMARATE 25 MG/1
25 TABLET, FILM COATED ORAL NIGHTLY
Qty: 90 TABLET | Refills: 3 | Status: SHIPPED | OUTPATIENT
Start: 2023-04-05

## 2023-05-02 ENCOUNTER — OFFICE VISIT (OUTPATIENT)
Dept: PSYCHOLOGY | Age: 38
End: 2023-05-02
Payer: COMMERCIAL

## 2023-05-02 DIAGNOSIS — F33.1 MAJOR DEPRESSIVE DISORDER, RECURRENT EPISODE, MODERATE WITH ANXIOUS DISTRESS (HCC): ICD-10-CM

## 2023-05-02 DIAGNOSIS — F43.10 PTSD (POST-TRAUMATIC STRESS DISORDER): Primary | ICD-10-CM

## 2023-05-02 DIAGNOSIS — F41.9 ANXIETY: ICD-10-CM

## 2023-05-02 DIAGNOSIS — F32.9 REACTIVE DEPRESSION: ICD-10-CM

## 2023-05-02 PROCEDURE — 4004F PT TOBACCO SCREEN RCVD TLK: CPT | Performed by: PSYCHOLOGIST

## 2023-05-02 PROCEDURE — 90837 PSYTX W PT 60 MINUTES: CPT | Performed by: PSYCHOLOGIST
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
## 2023-05-03 PROBLEM — F22 PARANOIA (HCC): Status: RESOLVED | Noted: 2022-09-01 | Resolved: 2023-05-03

## 2023-08-28 DIAGNOSIS — J40 BRONCHITIS: ICD-10-CM

## 2023-08-28 RX ORDER — AZITHROMYCIN 250 MG/1
TABLET, FILM COATED ORAL
Qty: 6 TABLET | Refills: 0 | OUTPATIENT
Start: 2023-08-28

## 2023-08-28 NOTE — TELEPHONE ENCOUNTER
I received a refill request from pharmacy for a zpak please contact pt and see if she needs a visit, is she sick?  Thanks

## 2023-08-28 NOTE — TELEPHONE ENCOUNTER
Recent Visits  Date Type Provider Dept   09/27/22 Office Visit NACHO Gallego CNP [facility]   09/01/22 Office Visit NACHO Gallego CNP [facility]   08/18/22 Office Visit NACHO Gallego CNP [facility]   07/06/22 Office Visit NACHO Le CNP [facility]   06/09/22 Office Visit NACHO Gallego CNP [facility]   03/23/22 Office Visit NACHO Gallego CNP [facility]   Showing recent visits within past 540 days with a meds authorizing provider and meeting all other requirements  Future Appointments  Date Type Provider Dept   09/25/23 Appointment NACHO Gallego CNP [facility]   Showing future appointments within next 150 days with a meds authorizing provider and meeting all other requirements     Future Appointments   Date Time Provider [address]   9/25/2023  9:40 AM NACHO Gallego

## 2023-08-29 NOTE — TELEPHONE ENCOUNTER
Tried calling patient in regards to the antibiotic that the pharmacy had requested refills on for patient. Left message on answering machine requesting pt to call back at earliest convenience.

## 2023-09-25 ENCOUNTER — OFFICE VISIT (OUTPATIENT)
Dept: FAMILY MEDICINE CLINIC | Age: 38
End: 2023-09-25
Payer: COMMERCIAL

## 2023-09-25 VITALS
DIASTOLIC BLOOD PRESSURE: 82 MMHG | HEART RATE: 81 BPM | WEIGHT: 172.8 LBS | SYSTOLIC BLOOD PRESSURE: 128 MMHG | BODY MASS INDEX: 27.12 KG/M2 | TEMPERATURE: 97.7 F | OXYGEN SATURATION: 97 % | RESPIRATION RATE: 16 BRPM | HEIGHT: 67 IN

## 2023-09-25 DIAGNOSIS — Z00.01 ENCOUNTER FOR GENERAL ADULT MEDICAL EXAMINATION WITH ABNORMAL FINDINGS: ICD-10-CM

## 2023-09-25 DIAGNOSIS — B35.3 TINEA PEDIS OF RIGHT FOOT: ICD-10-CM

## 2023-09-25 DIAGNOSIS — F17.200 SMOKER: ICD-10-CM

## 2023-09-25 DIAGNOSIS — R87.612 LGSIL ON PAP SMEAR OF CERVIX: ICD-10-CM

## 2023-09-25 DIAGNOSIS — Z91.09 ENVIRONMENTAL ALLERGIES: ICD-10-CM

## 2023-09-25 DIAGNOSIS — Z87.891 PERSONAL HISTORY OF TOBACCO USE, PRESENTING HAZARDS TO HEALTH: ICD-10-CM

## 2023-09-25 DIAGNOSIS — Z00.00 ENCOUNTER FOR WELL ADULT EXAM WITHOUT ABNORMAL FINDINGS: Primary | ICD-10-CM

## 2023-09-25 DIAGNOSIS — Z13.31 DEPRESSION SCREENING NEGATIVE: ICD-10-CM

## 2023-09-25 DIAGNOSIS — J01.00 ACUTE NON-RECURRENT MAXILLARY SINUSITIS: ICD-10-CM

## 2023-09-25 PROBLEM — F33.1 MAJOR DEPRESSIVE DISORDER, RECURRENT EPISODE, MODERATE WITH ANXIOUS DISTRESS (HCC): Status: RESOLVED | Noted: 2020-02-13 | Resolved: 2023-09-25

## 2023-09-25 PROCEDURE — 99214 OFFICE O/P EST MOD 30 MIN: CPT | Performed by: NURSE PRACTITIONER

## 2023-09-25 PROCEDURE — 99395 PREV VISIT EST AGE 18-39: CPT | Performed by: NURSE PRACTITIONER

## 2023-09-25 PROCEDURE — 99406 BEHAV CHNG SMOKING 3-10 MIN: CPT | Performed by: NURSE PRACTITIONER

## 2023-09-25 RX ORDER — LEVOCETIRIZINE DIHYDROCHLORIDE 5 MG/1
5 TABLET, FILM COATED ORAL NIGHTLY
Qty: 90 TABLET | Refills: 4 | Status: SHIPPED | OUTPATIENT
Start: 2023-09-25

## 2023-09-25 RX ORDER — CLOTRIMAZOLE AND BETAMETHASONE DIPROPIONATE 10; .64 MG/G; MG/G
CREAM TOPICAL
Qty: 45 G | Refills: 3 | Status: SHIPPED | OUTPATIENT
Start: 2023-09-25

## 2023-09-25 RX ORDER — FLUTICASONE PROPIONATE 50 MCG
1 SPRAY, SUSPENSION (ML) NASAL DAILY
Qty: 32 G | Refills: 1 | Status: SHIPPED | OUTPATIENT
Start: 2023-09-25

## 2023-09-25 SDOH — ECONOMIC STABILITY: HOUSING INSECURITY
IN THE LAST 12 MONTHS, WAS THERE A TIME WHEN YOU DID NOT HAVE A STEADY PLACE TO SLEEP OR SLEPT IN A SHELTER (INCLUDING NOW)?: NO

## 2023-09-25 SDOH — ECONOMIC STABILITY: INCOME INSECURITY: HOW HARD IS IT FOR YOU TO PAY FOR THE VERY BASICS LIKE FOOD, HOUSING, MEDICAL CARE, AND HEATING?: NOT HARD AT ALL

## 2023-09-25 SDOH — ECONOMIC STABILITY: FOOD INSECURITY: WITHIN THE PAST 12 MONTHS, THE FOOD YOU BOUGHT JUST DIDN'T LAST AND YOU DIDN'T HAVE MONEY TO GET MORE.: NEVER TRUE

## 2023-09-25 SDOH — ECONOMIC STABILITY: FOOD INSECURITY: WITHIN THE PAST 12 MONTHS, YOU WORRIED THAT YOUR FOOD WOULD RUN OUT BEFORE YOU GOT MONEY TO BUY MORE.: NEVER TRUE

## 2023-09-25 ASSESSMENT — PATIENT HEALTH QUESTIONNAIRE - PHQ9
SUM OF ALL RESPONSES TO PHQ QUESTIONS 1-9: 0
SUM OF ALL RESPONSES TO PHQ QUESTIONS 1-9: 0
2. FEELING DOWN, DEPRESSED OR HOPELESS: 0
SUM OF ALL RESPONSES TO PHQ9 QUESTIONS 1 & 2: 0
1. LITTLE INTEREST OR PLEASURE IN DOING THINGS: 0
SUM OF ALL RESPONSES TO PHQ QUESTIONS 1-9: 0
SUM OF ALL RESPONSES TO PHQ QUESTIONS 1-9: 0

## 2023-09-25 ASSESSMENT — ENCOUNTER SYMPTOMS
GASTROINTESTINAL NEGATIVE: 1
RESPIRATORY NEGATIVE: 1
COLOR CHANGE: 1

## 2023-09-25 NOTE — PROGRESS NOTES
Thought Content: Thought content normal.         Judgment: Judgment normal.         Assessment   Plan   1. Encounter for well adult exam without abnormal findings  2. Encounter for general adult medical examination with abnormal findings  3. Personal history of tobacco use, presenting hazards to health  -declines smoking cessation agents   -     VA TOBACCO USE CESSATION INTERMEDIATE 3-10 MINUTES [72269]  4. Depression screening negative  5. LGSIL on Pap smear of cervix  -continue with GYN  7. Environmental allergies  -not at goal start xyzal and flonase   -     levocetirizine (XYZAL) 5 MG tablet; Take 1 tablet by mouth nightly, Disp-90 tablet, R-4Normal  8. Smoker  9. Tinea pedis of right foot  -lotrisone  Change socks daily          Personalized Preventive Plan   Current Health Maintenance Status  Immunization History   Administered Date(s) Administered    TDaP, ADACEL (age 6y-58y), BOOSTRIX (age 10y+), IM, 0.5mL 04/28/2016        Health Maintenance   Topic Date Due    Hepatitis B vaccine (1 of 3 - 3-dose series) Never done    COVID-19 Vaccine (1) Never done    Pneumococcal 0-64 years Vaccine (1 - PCV) Never done    Flu vaccine (1) Never done    Depression Monitoring  09/27/2023    DTaP/Tdap/Td vaccine (2 - Td or Tdap) 04/28/2026    Hepatitis C screen  Completed    HIV screen  Completed    Hepatitis A vaccine  Aged Out    Hib vaccine  Aged Out    HPV vaccine  Aged Out    Meningococcal (ACWY) vaccine  Aged Out    Varicella vaccine  Discontinued    Cervical cancer screen  Discontinued     Recommendations for Preventive Services Due: see orders and patient instructions/AVS.    Return in about 1 year (around 9/25/2024) for Physical Exam.      Tobacco Cessation Counseling: Patient advised about behavior change, including information about personal health harms, usage of appropriate cessation measures and benefits of cessation.   Time spent (minutes): 10

## 2023-12-13 ENCOUNTER — TELEMEDICINE (OUTPATIENT)
Dept: FAMILY MEDICINE CLINIC | Age: 38
End: 2023-12-13
Payer: COMMERCIAL

## 2023-12-13 ENCOUNTER — APPOINTMENT (OUTPATIENT)
Dept: CT IMAGING | Age: 38
End: 2023-12-13
Payer: COMMERCIAL

## 2023-12-13 ENCOUNTER — HOSPITAL ENCOUNTER (EMERGENCY)
Age: 38
Discharge: HOME | End: 2023-12-13
Payer: COMMERCIAL

## 2023-12-13 VITALS
TEMPERATURE: 102.5 F | WEIGHT: 169.9 LBS | SYSTOLIC BLOOD PRESSURE: 118 MMHG | DIASTOLIC BLOOD PRESSURE: 71 MMHG | RESPIRATION RATE: 16 BRPM | HEART RATE: 96 BPM | OXYGEN SATURATION: 98 % | BODY MASS INDEX: 26.61 KG/M2

## 2023-12-13 DIAGNOSIS — R52 BODY ACHES: ICD-10-CM

## 2023-12-13 DIAGNOSIS — J02.9 ACUTE PHARYNGITIS, UNSPECIFIED ETIOLOGY: ICD-10-CM

## 2023-12-13 DIAGNOSIS — R68.83 CHILLS: ICD-10-CM

## 2023-12-13 DIAGNOSIS — R49.0 DYSPHONIA: ICD-10-CM

## 2023-12-13 DIAGNOSIS — J03.90 ACUTE TONSILLITIS, UNSPECIFIED ETIOLOGY: Primary | ICD-10-CM

## 2023-12-13 DIAGNOSIS — J35.1 TONSILLAR HYPERTROPHY, UNILATERAL: ICD-10-CM

## 2023-12-13 DIAGNOSIS — J02.9 PHARYNGITIS, UNSPECIFIED ETIOLOGY: Primary | ICD-10-CM

## 2023-12-13 LAB
ALBUMIN SERPL BCG-MCNC: 4 G/DL (ref 3.5–5.1)
ALP SERPL-CCNC: 77 U/L (ref 38–126)
ALT SERPL W/O P-5'-P-CCNC: 9 U/L (ref 11–66)
ANION GAP SERPL CALC-SCNC: 16 MEQ/L (ref 8–16)
AST SERPL-CCNC: 14 U/L (ref 5–40)
BASOPHILS ABSOLUTE: 0.1 THOU/MM3 (ref 0–0.1)
BASOPHILS NFR BLD AUTO: 0.4 %
BILIRUB SERPL-MCNC: 0.4 MG/DL (ref 0.3–1.2)
BUN SERPL-MCNC: 7 MG/DL (ref 7–22)
CALCIUM SERPL-MCNC: 9.5 MG/DL (ref 8.5–10.5)
CHLORIDE SERPL-SCNC: 98 MEQ/L (ref 98–111)
CO2 SERPL-SCNC: 22 MEQ/L (ref 23–33)
CREAT SERPL-MCNC: 0.6 MG/DL (ref 0.4–1.2)
DEPRECATED RDW RBC AUTO: 47.4 FL (ref 35–45)
EOSINOPHIL NFR BLD AUTO: 0.5 %
EOSINOPHILS ABSOLUTE: 0.1 THOU/MM3 (ref 0–0.4)
ERYTHROCYTE [DISTWIDTH] IN BLOOD BY AUTOMATED COUNT: 12.4 % (ref 11.5–14.5)
GFR SERPL CREATININE-BSD FRML MDRD: > 60 ML/MIN/1.73M2
GLUCOSE SERPL-MCNC: 96 MG/DL (ref 70–108)
HCT VFR BLD AUTO: 42.1 % (ref 37–47)
HETEROPH AB SERPL QL IA: NEGATIVE
HGB BLD-MCNC: 14.1 GM/DL (ref 12–16)
IMM GRANULOCYTES # BLD AUTO: 0.13 THOU/MM3 (ref 0–0.07)
IMM GRANULOCYTES NFR BLD AUTO: 0.6 %
LYMPHOCYTES ABSOLUTE: 1.3 THOU/MM3 (ref 1–4.8)
LYMPHOCYTES NFR BLD AUTO: 6.4 %
MCH RBC QN AUTO: 34.1 PG (ref 26–33)
MCHC RBC AUTO-ENTMCNC: 33.5 GM/DL (ref 32.2–35.5)
MCV RBC AUTO: 101.9 FL (ref 81–99)
MONOCYTES ABSOLUTE: 1.4 THOU/MM3 (ref 0.4–1.3)
MONOCYTES NFR BLD AUTO: 7.1 %
NEUTROPHILS NFR BLD AUTO: 85 %
NRBC BLD AUTO-RTO: 0 /100 WBC
OSMOLALITY SERPL CALC.SUM OF ELEC: 269.8 MOSMOL/KG (ref 275–300)
PLATELET # BLD AUTO: 264 THOU/MM3 (ref 130–400)
PMV BLD AUTO: 9.1 FL (ref 9.4–12.4)
POTASSIUM SERPL-SCNC: 4.2 MEQ/L (ref 3.5–5.2)
PROT SERPL-MCNC: 7.2 G/DL (ref 6.1–8)
RBC # BLD AUTO: 4.13 MILL/MM3 (ref 4.2–5.4)
S PYO AG THROAT QL: NEGATIVE
S PYO THROAT QL CULT: NORMAL
SEGMENTED NEUTROPHILS ABSOLUTE COUNT: 17.3 THOU/MM3 (ref 1.8–7.7)
SODIUM SERPL-SCNC: 136 MEQ/L (ref 135–145)
WBC # BLD AUTO: 20.3 THOU/MM3 (ref 4.8–10.8)

## 2023-12-13 PROCEDURE — 99212 OFFICE O/P EST SF 10 MIN: CPT

## 2023-12-13 PROCEDURE — 87880 STREP A ASSAY W/OPTIC: CPT

## 2023-12-13 PROCEDURE — 99285 EMERGENCY DEPT VISIT HI MDM: CPT

## 2023-12-13 PROCEDURE — 36415 COLL VENOUS BLD VENIPUNCTURE: CPT

## 2023-12-13 PROCEDURE — 6360000004 HC RX CONTRAST MEDICATION

## 2023-12-13 PROCEDURE — 70491 CT SOFT TISSUE NECK W/DYE: CPT

## 2023-12-13 PROCEDURE — 80053 COMPREHEN METABOLIC PANEL: CPT

## 2023-12-13 PROCEDURE — 96375 TX/PRO/DX INJ NEW DRUG ADDON: CPT

## 2023-12-13 PROCEDURE — 86308 HETEROPHILE ANTIBODY SCREEN: CPT

## 2023-12-13 PROCEDURE — 96365 THER/PROPH/DIAG IV INF INIT: CPT

## 2023-12-13 PROCEDURE — 2580000003 HC RX 258

## 2023-12-13 PROCEDURE — 85025 COMPLETE CBC W/AUTO DIFF WBC: CPT

## 2023-12-13 PROCEDURE — 99214 OFFICE O/P EST MOD 30 MIN: CPT | Performed by: NURSE PRACTITIONER

## 2023-12-13 PROCEDURE — 6360000002 HC RX W HCPCS

## 2023-12-13 PROCEDURE — 99215 OFFICE O/P EST HI 40 MIN: CPT

## 2023-12-13 PROCEDURE — 87070 CULTURE OTHR SPECIMN AEROBIC: CPT

## 2023-12-13 RX ORDER — KETOROLAC TROMETHAMINE 30 MG/ML
30 INJECTION, SOLUTION INTRAMUSCULAR; INTRAVENOUS ONCE
Status: COMPLETED | OUTPATIENT
Start: 2023-12-13 | End: 2023-12-13

## 2023-12-13 RX ORDER — IBUPROFEN 600 MG/1
600 TABLET ORAL EVERY 6 HOURS PRN
Qty: 30 TABLET | Refills: 0 | Status: SHIPPED | OUTPATIENT
Start: 2023-12-13

## 2023-12-13 RX ORDER — 0.9 % SODIUM CHLORIDE 0.9 %
1000 INTRAVENOUS SOLUTION INTRAVENOUS ONCE
Status: COMPLETED | OUTPATIENT
Start: 2023-12-13 | End: 2023-12-13

## 2023-12-13 RX ORDER — ACETAMINOPHEN 500 MG
1000 TABLET ORAL EVERY 6 HOURS PRN
Qty: 180 TABLET | Refills: 0 | Status: SHIPPED | OUTPATIENT
Start: 2023-12-13

## 2023-12-13 RX ORDER — CEPHALEXIN 500 MG/1
500 CAPSULE ORAL 2 TIMES DAILY
Qty: 20 CAPSULE | Refills: 0 | Status: SHIPPED | OUTPATIENT
Start: 2023-12-13 | End: 2023-12-23

## 2023-12-13 RX ADMIN — KETOROLAC TROMETHAMINE 30 MG: 30 INJECTION, SOLUTION INTRAMUSCULAR at 15:56

## 2023-12-13 RX ADMIN — IOPAMIDOL 80 ML: 755 INJECTION, SOLUTION INTRAVENOUS at 16:15

## 2023-12-13 RX ADMIN — CEFTRIAXONE SODIUM 1000 MG: 1 INJECTION, POWDER, FOR SOLUTION INTRAMUSCULAR; INTRAVENOUS at 18:05

## 2023-12-13 RX ADMIN — SODIUM CHLORIDE 1000 ML: 9 INJECTION, SOLUTION INTRAVENOUS at 18:07

## 2023-12-13 RX ADMIN — SODIUM CHLORIDE 1000 ML: 9 INJECTION, SOLUTION INTRAVENOUS at 15:55

## 2023-12-13 ASSESSMENT — ENCOUNTER SYMPTOMS
SINUS PAIN: 0
VOICE CHANGE: 1
SORE THROAT: 1
RESPIRATORY NEGATIVE: 1
SINUS PRESSURE: 0
TROUBLE SWALLOWING: 0
RHINORRHEA: 0

## 2023-12-13 ASSESSMENT — PAIN - FUNCTIONAL ASSESSMENT: PAIN_FUNCTIONAL_ASSESSMENT: 0-10

## 2023-12-13 ASSESSMENT — PAIN SCALES - GENERAL: PAINLEVEL_OUTOF10: 6

## 2023-12-13 NOTE — ED PROVIDER NOTES
[facility] EMERGENCY DEPT  Urgent Care Encounter      CHIEF COMPLAINT       Chief Complaint   Patient presents with    Pharyngitis       Nurses Notes reviewed and I agree except as noted in the HPI. HISTORY OF PRESENT Sandra Veloz is a 41 y.o. female who presents urgent care for evaluation of sore throat. Patient reports onset of symptoms yesterday. She reports she googled on how to treat and has been drinking cold and warm beverages and then took cough syrup last night to go to sleep. Today she woke up with significant pain left-sided throat, voice change, reports difficulty breathing due to swelling. She seen her primary care provider [facility] who advised her to be evaluated in urgent care. REVIEW OF SYSTEMS     Review of Systems   Constitutional:  Positive for fever. Negative for chills, diaphoresis and fatigue. HENT:  Positive for sore throat. Negative for congestion, ear pain, rhinorrhea and trouble swallowing. Eyes:  Negative for discharge and redness. Respiratory:  Negative for cough and shortness of breath. Cardiovascular:  Negative for chest pain. Gastrointestinal:  Negative for diarrhea, nausea and vomiting. Genitourinary:  Negative for decreased urine volume. Musculoskeletal:  Negative for neck pain and neck stiffness. Skin:  Negative for rash. Neurological:  Negative for headaches. Hematological:  Negative for adenopathy. Psychiatric/Behavioral:  Negative for sleep disturbance. PAST MEDICAL HISTORY         Diagnosis Date    Abnormal Pap smear and cervical HPV (human papillomavirus)     Anxiety     Asthma     Depression     PTSD (post-traumatic stress disorder)     Smoker     Substance abuse ([address] )     history of    Tonsillitis     recurrent       SURGICAL HISTORY     Patient  has a past surgical history that includes Dental surgery; Tubal ligation (1/2012); Foot surgery (Right, 08/26/14); and HYSTERECTOMY ABDOMINAL (N/A, 8/5/2019).     CURRENT

## 2023-12-13 NOTE — PROGRESS NOTES
2023    TELEHEALTH EVALUATION -- Audio/Visual    HPI:    Toni Vann (:  1985) has requested an audio/video evaluation for the following concern(s):    Pt here for a virtual visit today. States mariposa has a 2-3  day history of left unilateral tonsil pain and swelling, dysphonia, body ache levin chills and dysphagia. Has not tried any medications, getting worse, able to swallow but hurts. Review of Systems   Constitutional:  Positive for chills, fatigue and fever. HENT:  Positive for congestion, sore throat and voice change. Negative for rhinorrhea, sinus pressure, sinus pain, sneezing, tinnitus and trouble swallowing. Respiratory: Negative. Cardiovascular: Negative. Musculoskeletal:  Positive for arthralgias and myalgias. Skin: Negative. Neurological:  Positive for headaches. Negative for dizziness, facial asymmetry and light-headedness. Prior to Visit Medications    Medication Sig Taking? Authorizing Provider   clotrimazole-betamethasone (LOTRISONE) 1-0.05 % cream Apply topically 2 times daily.   Marvin Stacy APRN - ABRAM   fluticasone [facility]) 50 MCG/ACT nasal spray 1 spray by Each Nostril route daily  Jean Marie Mccullough APRN - CNP   levocetirizine (XYZAL) 5 MG tablet Take 1 tablet by mouth nightly  Jean Marie Mccullough APRN - CNP   melatonin 3 MG TABS tablet Take 1 tablet by mouth nightly as needed (Insomnia)  NACHO Summers CNP   albuterol sulfate HFA (PROVENTIL HFA) 108 (90 Base) MCG/ACT inhaler Inhale 2 puffs into the lungs every 6 hours as needed for Wheezing  Jean Marie Mccullough APRN - CNP   acetaminophen (TYLENOL) 325 MG tablet Take 2 tablets by mouth every 6 hours as needed for Pain  Provider, MD Elena       Social History     Tobacco Use    Smoking status: Every Day     Packs/day: 2.00     Years: 16.00     Additional pack years: 0.00     Total pack years: 32.00     Types: Cigarettes     Start date:     Smokeless tobacco: Never    Tobacco comments:     Not

## 2023-12-13 NOTE — ED NOTES
Pt to ED from [facility]. Pt told to come here due to an abscess in the back of her throat. Pt has had throat pain for about two days.       Malcolm Navarrete RN  12/13/23 [id number]

## 2023-12-15 ENCOUNTER — OFFICE VISIT (OUTPATIENT)
Dept: FAMILY MEDICINE CLINIC | Age: 38
End: 2023-12-15
Payer: COMMERCIAL

## 2023-12-15 VITALS
WEIGHT: 178.4 LBS | RESPIRATION RATE: 12 BRPM | HEIGHT: 67 IN | HEART RATE: 85 BPM | DIASTOLIC BLOOD PRESSURE: 76 MMHG | SYSTOLIC BLOOD PRESSURE: 118 MMHG | BODY MASS INDEX: 28 KG/M2 | OXYGEN SATURATION: 98 % | TEMPERATURE: 97 F

## 2023-12-15 DIAGNOSIS — J03.90 ACUTE TONSILLITIS, UNSPECIFIED ETIOLOGY: Primary | ICD-10-CM

## 2023-12-15 PROCEDURE — 99214 OFFICE O/P EST MOD 30 MIN: CPT | Performed by: NURSE PRACTITIONER

## 2023-12-15 NOTE — PROGRESS NOTES
present. Left cervical: Superficial cervical adenopathy and deep cervical adenopathy present. Skin:     General: Skin is warm and dry. Findings: No abrasion or rash. Neurological:      Mental Status: She is alert and oriented to person, place, and time. GCS: GCS eye subscore is 4. GCS verbal subscore is 5. GCS motor subscore is 6. Cranial Nerves: No cranial nerve deficit. Sensory: No sensory deficit. Coordination: Coordination normal.      Gait: Gait normal.   Psychiatric:         Speech: Speech normal.         Behavior: Behavior normal. Behavior is cooperative. Thought Content: Thought content normal.         Judgment: Judgment normal.           ASSESSMENT & PLAN  Valentina Clark was seen today for follow-up. Diagnoses and all orders for this visit:    Acute tonsillitis, unspecified etiology      - reviewed labs and CT soft tissue neck  - con't Keflex for now  - throat culture is still pending  - rec'd also using salt water gargles  - advised to con't monitoring mychart for throat culture results and if the culture does result over the weekend and she has concerned that the Keflex may not be appropriate therapy to call the office and speak with on call provider  - ER precautions given  - work release    Return if symptoms worsen or fail to improve. ER precautions given. Advised to seek care immediately if any new or worsening symptoms. The most recent results of the following tests were reviewed with the patient today: CBC, CT Scan of neck, and mono testing and throat cultures    All copied or forwarded information in the progress note was verified by me to be accurate at the time of visit  Patient's past medical, surgical, social and family history were reviewed and updated     All patient questions answered. Patient voiced understanding.

## 2023-12-16 LAB — BACTERIA THROAT AEROBE CULT: NORMAL

## 2024-01-18 NOTE — PROGRESS NOTES
regarding unexplained night sweats over the last year. On chart review patient was seen by family medicine provider in 2013 with concern for chronic night sweats.   Resolution of recent tonsillitis without any residual concerns noted today regarding recent infection. Reviewed guidelines regarding tonsillectomy and patient wishes to proceed with this. Will plan to recheck CBC to monitor progression of leukocytosis prior to upcoming surgery.   No history of cardiac/lung disorders or personal/family bleeding concerns or complications with anesthesia.  Return for scheduled surgery.  The risks, benefits, and alternatives to tonsillectomy have been discussed with the patient.The risks include but are not limited to post-operative bleeding requiring hospitalization and/or surgery, dehydration, pain, change in vocal resonance, pneumonia, halitosis, and recurrent throat infections. All questions were answered. The patient expressed understanding and decided to proceed accordingly.      (Please note that portions of this note may have been completed with a voice recognition program.  Efforts were made to edit the dictation but occasionally words are mis-transcribed.)    Electronically signed by NACHO Ramirez CNP on 1/22/2024 at 3:42 PM

## 2024-01-22 ENCOUNTER — OFFICE VISIT (OUTPATIENT)
Dept: ENT CLINIC | Age: 39
End: 2024-01-22
Payer: COMMERCIAL

## 2024-01-22 VITALS
HEIGHT: 67 IN | TEMPERATURE: 97.3 F | DIASTOLIC BLOOD PRESSURE: 74 MMHG | BODY MASS INDEX: 26.82 KG/M2 | WEIGHT: 170.9 LBS | SYSTOLIC BLOOD PRESSURE: 120 MMHG

## 2024-01-22 DIAGNOSIS — J03.91 RECURRENT TONSILLITIS: Primary | ICD-10-CM

## 2024-01-22 DIAGNOSIS — R61 UNEXPLAINED NIGHT SWEATS: ICD-10-CM

## 2024-01-22 DIAGNOSIS — Z01.818 PREOP TESTING: Primary | ICD-10-CM

## 2024-01-22 DIAGNOSIS — J03.01 RECURRENT STREPTOCOCCAL TONSILLITIS: ICD-10-CM

## 2024-01-22 DIAGNOSIS — R06.83 SNORING: ICD-10-CM

## 2024-01-22 DIAGNOSIS — F17.200 SMOKER: ICD-10-CM

## 2024-01-22 PROCEDURE — 99204 OFFICE O/P NEW MOD 45 MIN: CPT | Performed by: REGISTERED NURSE

## 2024-01-23 ENCOUNTER — TELEPHONE (OUTPATIENT)
Dept: FAMILY MEDICINE CLINIC | Age: 39
End: 2024-01-23

## 2024-01-23 ENCOUNTER — TELEMEDICINE (OUTPATIENT)
Dept: FAMILY MEDICINE CLINIC | Age: 39
End: 2024-01-23
Payer: COMMERCIAL

## 2024-01-23 DIAGNOSIS — F43.9 STRESS AT HOME: ICD-10-CM

## 2024-01-23 DIAGNOSIS — F41.0 PANIC: ICD-10-CM

## 2024-01-23 DIAGNOSIS — F41.9 ANXIETY: Primary | ICD-10-CM

## 2024-01-23 PROCEDURE — 99213 OFFICE O/P EST LOW 20 MIN: CPT | Performed by: NURSE PRACTITIONER

## 2024-01-23 RX ORDER — HYDROXYZINE PAMOATE 25 MG/1
25 CAPSULE ORAL 3 TIMES DAILY PRN
Qty: 28 CAPSULE | Refills: 1 | Status: SHIPPED | OUTPATIENT
Start: 2024-01-23 | End: 2024-02-06

## 2024-01-23 RX ORDER — PAROXETINE 10 MG/1
10 TABLET, FILM COATED ORAL DAILY
Qty: 30 TABLET | Refills: 3 | Status: SHIPPED | OUTPATIENT
Start: 2024-01-23

## 2024-01-23 ASSESSMENT — ENCOUNTER SYMPTOMS: RESPIRATORY NEGATIVE: 1
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
## 2024-01-23 NOTE — TELEPHONE ENCOUNTER
Please have pt set up a f/u appt on 2/6/2024 for anxiety . Off work letter has been written for 2 weeks see where it needs sent to . Thanks

## 2024-01-23 NOTE — TELEPHONE ENCOUNTER
Appt scheduled  Future Appointments   Date Time Provider Department Center   2/6/2024  9:20 AM Jean Marie White APRN - CNP [facility] [place]   3/18/2024  1:40 PM Alicia Merida APRN - CNP [facility] [place]   9/30/2024  9:40 AM Jean Marie White APRN - CNP [facility] [place]        Letter placed at the /pt will  the letter tomorrow

## 2024-02-05 ENCOUNTER — PREP FOR PROCEDURE (OUTPATIENT)
Dept: ENT CLINIC | Age: 39
End: 2024-02-05

## 2024-02-05 ENCOUNTER — TELEPHONE (OUTPATIENT)
Dept: ENT CLINIC | Age: 39
End: 2024-02-05

## 2024-02-05 DIAGNOSIS — J01.00 ACUTE NON-RECURRENT MAXILLARY SINUSITIS: ICD-10-CM

## 2024-02-05 RX ORDER — FLUTICASONE PROPIONATE 50 MCG
1 SPRAY, SUSPENSION (ML) NASAL DAILY
Qty: 32 G | Refills: 1 | Status: SHIPPED | OUTPATIENT
Start: 2024-02-05

## 2024-02-05 RX ORDER — SODIUM CHLORIDE 0.9 % (FLUSH) 0.9 %
5-40 SYRINGE (ML) INJECTION EVERY 12 HOURS SCHEDULED
Status: CANCELLED | OUTPATIENT
Start: 2024-02-05

## 2024-02-05 RX ORDER — SODIUM CHLORIDE 9 MG/ML
INJECTION, SOLUTION INTRAVENOUS PRN
Status: CANCELLED | OUTPATIENT
Start: 2024-02-05

## 2024-02-05 RX ORDER — SODIUM CHLORIDE 0.9 % (FLUSH) 0.9 %
5-40 SYRINGE (ML) INJECTION PRN
Status: CANCELLED | OUTPATIENT
Start: 2024-02-05

## 2024-02-05 NOTE — TELEPHONE ENCOUNTER
Recent Visits  Date Type Provider Dept   12/15/23 Office Visit John Paul Tony APRN - CNP [facility]   09/25/23 Office Visit Jean Marie White APRN - CNP [facility]   09/27/22 Office Visit Jean Marie White APRN - CNP [facility]   09/01/22 Office Visit Jean Marie White APRN - CNP [facility]   08/18/22 Office Visit Jean Marie White APRN - CNP [facility]   Showing recent visits within past 540 days with a meds authorizing provider and meeting all other requirements  Future Appointments  Date Type Provider Dept   02/06/24 Appointment Jean Marie White APRN - CNP [facility]   Showing future appointments within next 150 days with a meds authorizing provider and meeting all other requirements

## 2024-02-05 NOTE — TELEPHONE ENCOUNTER
Lvm to have cxr done or cb if has had done somewhere outside of [facility].

## 2024-02-06 ENCOUNTER — OFFICE VISIT (OUTPATIENT)
Dept: FAMILY MEDICINE CLINIC | Age: 39
End: 2024-02-06
Payer: COMMERCIAL

## 2024-02-06 VITALS
RESPIRATION RATE: 18 BRPM | HEART RATE: 104 BPM | SYSTOLIC BLOOD PRESSURE: 132 MMHG | OXYGEN SATURATION: 98 % | BODY MASS INDEX: 27.44 KG/M2 | WEIGHT: 174.8 LBS | TEMPERATURE: 98.3 F | DIASTOLIC BLOOD PRESSURE: 88 MMHG | HEIGHT: 67 IN

## 2024-02-06 DIAGNOSIS — F41.9 ANXIETY: ICD-10-CM

## 2024-02-06 DIAGNOSIS — F43.9 STRESS AT HOME: ICD-10-CM

## 2024-02-06 DIAGNOSIS — F41.0 PANIC: ICD-10-CM

## 2024-02-06 PROCEDURE — 99214 OFFICE O/P EST MOD 30 MIN: CPT | Performed by: NURSE PRACTITIONER

## 2024-02-06 RX ORDER — PAROXETINE HYDROCHLORIDE 20 MG/1
20 TABLET, FILM COATED ORAL DAILY
Qty: 30 TABLET | Refills: 3 | Status: SHIPPED | OUTPATIENT
Start: 2024-02-06

## 2024-02-06 RX ORDER — HYDROXYZINE PAMOATE 50 MG/1
50 CAPSULE ORAL 3 TIMES DAILY PRN
Qty: 30 CAPSULE | Refills: 1 | Status: SHIPPED | OUTPATIENT
Start: 2024-02-06 | End: 2024-03-07

## 2024-02-06 ASSESSMENT — PATIENT HEALTH QUESTIONNAIRE - PHQ9
9. THOUGHTS THAT YOU WOULD BE BETTER OFF DEAD, OR OF HURTING YOURSELF: 0
1. LITTLE INTEREST OR PLEASURE IN DOING THINGS: 3
SUM OF ALL RESPONSES TO PHQ QUESTIONS 1-9: 17
SUM OF ALL RESPONSES TO PHQ QUESTIONS 1-9: 17
2. FEELING DOWN, DEPRESSED OR HOPELESS: 1
SUM OF ALL RESPONSES TO PHQ QUESTIONS 1-9: 17
8. MOVING OR SPEAKING SO SLOWLY THAT OTHER PEOPLE COULD HAVE NOTICED. OR THE OPPOSITE, BEING SO FIGETY OR RESTLESS THAT YOU HAVE BEEN MOVING AROUND A LOT MORE THAN USUAL: 0
SUM OF ALL RESPONSES TO PHQ QUESTIONS 1-9: 17
SUM OF ALL RESPONSES TO PHQ9 QUESTIONS 1 & 2: 4
4. FEELING TIRED OR HAVING LITTLE ENERGY: 3
5. POOR APPETITE OR OVEREATING: 3
10. IF YOU CHECKED OFF ANY PROBLEMS, HOW DIFFICULT HAVE THESE PROBLEMS MADE IT FOR YOU TO DO YOUR WORK, TAKE CARE OF THINGS AT HOME, OR GET ALONG WITH OTHER PEOPLE: 3
7. TROUBLE CONCENTRATING ON THINGS, SUCH AS READING THE NEWSPAPER OR WATCHING TELEVISION: 3
6. FEELING BAD ABOUT YOURSELF - OR THAT YOU ARE A FAILURE OR HAVE LET YOURSELF OR YOUR FAMILY DOWN: 1
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
## 2024-02-19 ENCOUNTER — TELEPHONE (OUTPATIENT)
Dept: FAMILY MEDICINE CLINIC | Age: 39
End: 2024-02-19

## 2024-02-19 NOTE — TELEPHONE ENCOUNTER
----- Message from Ibeth Sarabia sent at 2/19/2024  1:25 PM EST -----  Subject: Message to Provider    QUESTIONS  Information for Provider? Patient needs a return to work order. Return   date Wed. 2/21 with no restrictions full time.   ---------------------------------------------------------------------------  --------------  CALL BACK INFO  [phone]; OK to leave message on voicemail  ---------------------------------------------------------------------------  --------------  SCRIPT ANSWERS  Relationship to Patient? Self

## 2024-02-19 NOTE — TELEPHONE ENCOUNTER
Work note written and in black basket by printer, please send or fax where she needs it to go thanks

## 2024-03-05 ENCOUNTER — OFFICE VISIT (OUTPATIENT)
Dept: FAMILY MEDICINE CLINIC | Age: 39
End: 2024-03-05
Payer: COMMERCIAL

## 2024-03-05 VITALS
HEART RATE: 79 BPM | OXYGEN SATURATION: 98 % | DIASTOLIC BLOOD PRESSURE: 86 MMHG | SYSTOLIC BLOOD PRESSURE: 134 MMHG | TEMPERATURE: 98.2 F | WEIGHT: 183 LBS | HEIGHT: 67 IN | BODY MASS INDEX: 28.72 KG/M2 | RESPIRATION RATE: 18 BRPM

## 2024-03-05 DIAGNOSIS — F41.9 ANXIETY: Primary | ICD-10-CM

## 2024-03-05 DIAGNOSIS — F51.04 PSYCHOPHYSIOLOGICAL INSOMNIA: ICD-10-CM

## 2024-03-05 DIAGNOSIS — F43.10 PTSD (POST-TRAUMATIC STRESS DISORDER): ICD-10-CM

## 2024-03-05 DIAGNOSIS — F41.0 PANIC: ICD-10-CM

## 2024-03-05 DIAGNOSIS — F43.9 STRESS AT HOME: ICD-10-CM

## 2024-03-05 PROCEDURE — 99213 OFFICE O/P EST LOW 20 MIN: CPT | Performed by: NURSE PRACTITIONER

## 2024-03-05 RX ORDER — PAROXETINE 30 MG/1
30 TABLET, FILM COATED ORAL DAILY
Qty: 90 TABLET | Refills: 1 | Status: SHIPPED | OUTPATIENT
Start: 2024-03-05

## 2024-03-05 ASSESSMENT — ENCOUNTER SYMPTOMS: RESPIRATORY NEGATIVE: 1
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
## 2024-03-14 ENCOUNTER — HOSPITAL ENCOUNTER (OUTPATIENT)
Age: 39
Discharge: HOME | End: 2024-03-14
Payer: COMMERCIAL

## 2024-03-14 ENCOUNTER — HOSPITAL ENCOUNTER (OUTPATIENT)
Dept: GENERAL RADIOLOGY | Age: 39
Discharge: HOME | End: 2024-03-14
Payer: COMMERCIAL

## 2024-03-14 DIAGNOSIS — Z01.818 PREOP TESTING: ICD-10-CM

## 2024-03-14 PROCEDURE — 71046 X-RAY EXAM CHEST 2 VIEWS: CPT

## 2024-03-15 ENCOUNTER — HOSPITAL ENCOUNTER (OUTPATIENT)
Age: 39
Discharge: HOME | End: 2024-03-15
Payer: COMMERCIAL

## 2024-03-15 ENCOUNTER — PREP FOR PROCEDURE (OUTPATIENT)
Dept: ENT CLINIC | Age: 39
End: 2024-03-15

## 2024-03-15 ENCOUNTER — HOSPITAL ENCOUNTER (EMERGENCY)
Age: 39
Discharge: HOME | End: 2024-03-15
Payer: COMMERCIAL

## 2024-03-15 VITALS
HEART RATE: 78 BPM | HEIGHT: 67 IN | RESPIRATION RATE: 18 BRPM | SYSTOLIC BLOOD PRESSURE: 155 MMHG | WEIGHT: 175.2 LBS | TEMPERATURE: 97.6 F | DIASTOLIC BLOOD PRESSURE: 77 MMHG | OXYGEN SATURATION: 98 % | BODY MASS INDEX: 27.5 KG/M2

## 2024-03-15 DIAGNOSIS — J02.9 SORE THROAT: Primary | ICD-10-CM

## 2024-03-15 DIAGNOSIS — J03.91 RECURRENT TONSILLITIS: ICD-10-CM

## 2024-03-15 DIAGNOSIS — R06.83 SNORING: ICD-10-CM

## 2024-03-15 DIAGNOSIS — R61 UNEXPLAINED NIGHT SWEATS: ICD-10-CM

## 2024-03-15 DIAGNOSIS — J03.01 RECURRENT STREPTOCOCCAL TONSILLITIS: Primary | ICD-10-CM

## 2024-03-15 LAB
BASOPHILS ABSOLUTE: 0 THOU/MM3 (ref 0–0.1)
BASOPHILS NFR BLD AUTO: 0.6 %
DEPRECATED RDW RBC AUTO: 51.3 FL (ref 35–45)
EOSINOPHIL NFR BLD AUTO: 4.3 %
EOSINOPHILS ABSOLUTE: 0.2 THOU/MM3 (ref 0–0.4)
ERYTHROCYTE [DISTWIDTH] IN BLOOD BY AUTOMATED COUNT: 13.3 % (ref 11.5–14.5)
HCT VFR BLD AUTO: 49.1 % (ref 37–47)
HGB BLD-MCNC: 16.2 GM/DL (ref 12–16)
IMM GRANULOCYTES # BLD AUTO: 0.02 THOU/MM3 (ref 0–0.07)
IMM GRANULOCYTES NFR BLD AUTO: 0.4 %
LYMPHOCYTES ABSOLUTE: 1.6 THOU/MM3 (ref 1–4.8)
LYMPHOCYTES NFR BLD AUTO: 31.7 %
MCH RBC QN AUTO: 34.1 PG (ref 26–33)
MCHC RBC AUTO-ENTMCNC: 33 GM/DL (ref 32.2–35.5)
MCV RBC AUTO: 103.4 FL (ref 81–99)
MONOCYTES ABSOLUTE: 0.7 THOU/MM3 (ref 0.4–1.3)
MONOCYTES NFR BLD AUTO: 12.6 %
NEUTROPHILS NFR BLD AUTO: 50.4 %
NRBC BLD AUTO-RTO: 0 /100 WBC
PLATELET # BLD AUTO: 266 THOU/MM3 (ref 130–400)
PMV BLD AUTO: 10.1 FL (ref 9.4–12.4)
RBC # BLD AUTO: 4.75 MILL/MM3 (ref 4.2–5.4)
S PYO AG THROAT QL: NEGATIVE
SEGMENTED NEUTROPHILS ABSOLUTE COUNT: 2.6 THOU/MM3 (ref 1.8–7.7)
WBC # BLD AUTO: 5.2 THOU/MM3 (ref 4.8–10.8)

## 2024-03-15 PROCEDURE — 99213 OFFICE O/P EST LOW 20 MIN: CPT

## 2024-03-15 PROCEDURE — 85025 COMPLETE CBC W/AUTO DIFF WBC: CPT

## 2024-03-15 PROCEDURE — 99213 OFFICE O/P EST LOW 20 MIN: CPT | Performed by: NURSE PRACTITIONER

## 2024-03-15 PROCEDURE — 36415 COLL VENOUS BLD VENIPUNCTURE: CPT

## 2024-03-15 PROCEDURE — 87651 STREP A DNA AMP PROBE: CPT

## 2024-03-15 ASSESSMENT — PAIN - FUNCTIONAL ASSESSMENT: PAIN_FUNCTIONAL_ASSESSMENT: 0-10

## 2024-03-15 ASSESSMENT — PAIN DESCRIPTION - FREQUENCY: FREQUENCY: INTERMITTENT

## 2024-03-15 ASSESSMENT — PAIN DESCRIPTION - ONSET: ONSET: GRADUAL

## 2024-03-15 ASSESSMENT — ENCOUNTER SYMPTOMS
SHORTNESS OF BREATH: 0
COUGH: 0
ABDOMINAL PAIN: 0
DIARRHEA: 0
NAUSEA: 0
VOMITING: 0
SORE THROAT: 1
SINUS PRESSURE: 0

## 2024-03-15 ASSESSMENT — PAIN DESCRIPTION - DESCRIPTORS: DESCRIPTORS: SORE

## 2024-03-15 ASSESSMENT — PAIN DESCRIPTION - LOCATION: LOCATION: THROAT

## 2024-03-15 ASSESSMENT — PAIN SCALES - GENERAL: PAINLEVEL_OUTOF10: 5

## 2024-03-15 ASSESSMENT — PAIN DESCRIPTION - PAIN TYPE: TYPE: ACUTE PAIN

## 2024-03-15 NOTE — PROGRESS NOTES
ENT office informed pt currently at [facility] for sore throat, strept testing in progress.

## 2024-03-15 NOTE — ED TRIAGE NOTES
Arrives to [facility] for the evaluation of sore throat and nasal congestion.  Symptoms started 3 days ago and is scheduled to have tonsillectomy on 3/22/24.  Afebrile.  Has been taking Advil severe sinus/congestion and Nyquil at home for symptoms.  Took both yesterday, no medications yet today.  Respirations unlabored, no cough.  Tonsils are red and enlarged.  Swabbed for strep, results pending.  Reports taking a home COVID test yesterday that was negative.  Waiting provider to assess.

## 2024-03-15 NOTE — PROGRESS NOTES
PAT Call Date: 3/18   Surgery Date: 3/22    Surgeon: Demetris   Surgery: tonsils    Any Isolation Precautions? No      Type of Isolation Precaution: Not Applicable   Is patient from a nursing home? No  Name of Nursing Home:   Any equipment assist needed for moving patient? No   Type of Equipment: Not Applicable  Patient last weight: 175 lb     Hard Copy on Chart  In EPIC Pending/Notes   Consent -   Within 30 days; signed, dated & timed by patient and physician     [x] On Arrival     [] Blood      [] DNR   H&P -   Within 30 days    [] Physician To Do     Clearance -      []Medical     []Cardiac     [] Pulmonary    Orders -   Signed and Dated      [] Physician To Do    Labs -   Within 3 months   3/15   [x] CBC -ok   [x] CMP 12/13-ok   [] GFR   [] INR    [] PTT    [] Urine    [] Liver Enzymes    [] MRSA Nasal      Others:     Radiology Studies -   Within 1 year  3/14    12/13  [x] Chest X-Ray-ok   [] MRI    [x] CT    [] Vascular   [] US     Pulmonary -     [] RAHEEM   [] CPAP     Cardiac Workup -   Stress Test, Echo, Cath within 18 months  N/a  [] EKG      [] Cath                 [] Stress Test                      [] Echo/ANJELICA   [] CABG   [] Holter Monitor      [] Pacemaker/ICD        Brand:        Where does patient have checked:         Last check:         Rep Notified:

## 2024-03-15 NOTE — ED PROVIDER NOTES
[facility] URGENT CARE  UrgentCare Encounter      CHIEFCOMPLAINT       Chief Complaint   Patient presents with    Pharyngitis     Scheduled for Tonsillectomy on 3/22/24     Nasal Congestion       Nurses Notes reviewed and I agree except as noted in the HPI.  HISTORY OF PRESENT ILLNESS   Denisse Hilliard is a 38 y.o. female who presents to the urgent care for evaluation.     She complains of a sore throat nad congestion.  Scheduled for tonsillectomy on 3/22/2024.  Symptoms started 3 days ago.     The patient/patient representative has no other acute complaints at this time.    REVIEW OF SYSTEMS     Review of Systems   Constitutional:  Negative for chills, fatigue and fever.   HENT:  Positive for sore throat. Negative for congestion, ear pain and sinus pressure.    Respiratory:  Negative for cough and shortness of breath.    Cardiovascular:  Negative for chest pain.   Gastrointestinal:  Negative for abdominal pain, diarrhea, nausea and vomiting.   Skin:  Negative for rash.   Allergic/Immunologic: Negative for environmental allergies and food allergies.   Neurological:  Negative for headaches.   Hematological:  Negative for adenopathy.       PAST MEDICAL HISTORY         Diagnosis Date    Abnormal Pap smear and cervical HPV (human papillomavirus)     ADHD (attention deficit hyperactivity disorder) 1998    Not diagnosed, just was always told I showed traits to oscar    Anxiety     Asthma     Depression     Headache 2000    PTSD (post-traumatic stress disorder)     Smoker     Substance abuse (HCC)     history of    Tonsillitis     recurrent       SURGICAL HISTORY     Patient  has a past surgical history that includes Dental surgery; Tubal ligation (01/01/2012); Foot surgery (Right, 08/26/2014); HYSTERECTOMY ABDOMINAL (N/A, 08/05/2019); and Partial hysterectomy (2019).    CURRENT MEDICATIONS       Previous Medications    ACETAMINOPHEN (TYLENOL) 500 MG TABLET    Take 2 tablets by mouth every 6 hours as needed  for Pain or Fever    ALBUTEROL SULFATE HFA (PROVENTIL HFA) 108 (90 BASE) MCG/ACT INHALER    Inhale 2 puffs into the lungs every 6 hours as needed for Wheezing    FLUTICASONE (FLONASE) 50 MCG/ACT NASAL SPRAY    instill 1 spray into each nostril once daily    IBUPROFEN (ADVIL;MOTRIN) 600 MG TABLET    Take 1 tablet by mouth every 6 hours as needed for Pain or Fever    LEVOCETIRIZINE (XYZAL) 5 MG TABLET    Take 1 tablet by mouth nightly    MELATONIN 3 MG TABS TABLET    Take 1 tablet by mouth nightly as needed (Insomnia)    PAROXETINE (PAXIL) 30 MG TABLET    Take 1 tablet by mouth daily       ALLERGIES     Patient is is allergic to augmentin [amoxicillin-pot clavulanate], bee venom, nicotine, and bee pollen.    FAMILY HISTORY     Patient'sfamily history includes Alcohol Abuse in an other family member; Breast Cancer in her paternal grandmother; Cancer in her mother and paternal grandmother.    SOCIAL HISTORY     Patient  reports that she has been smoking cigarettes. She started smoking about 20 years ago. She has a 8.0 pack-year smoking history. She has never used smokeless tobacco. She reports current alcohol use of about 2.0 standard drinks of alcohol per week. She reports current drug use. Frequency: 10.00 times per week. Drug: Marijuana (Weed).    PHYSICAL EXAM     ED TRIAGE VITALS  BP: (!) 155/77, Temp: 97.6 °F (36.4 °C), Pulse: 78, Respirations: 18, SpO2: 98 %  Physical Exam  Vitals and nursing note reviewed.   Constitutional:       General: She is not in acute distress.     Appearance: Normal appearance. She is well-developed and well-groomed. She is not toxic-appearing.   HENT:      Right Ear: External ear normal.      Left Ear: External ear normal.      Nose: Nose normal.      Mouth/Throat:      Lips: Pink.      Mouth: Mucous membranes are moist.      Pharynx: Uvula midline. Posterior oropharyngeal erythema and uvula swelling present.      Tonsils: 1+ on the right. 1+ on the left.   Cardiovascular:      Rate and

## 2024-03-18 NOTE — PROGRESS NOTES
Patient states she is feeling fine now and denies any cough or sore throat.      Follow all instructions given by your physician  Do not eat or drink anything after midnight prior to surgery(includes water, chewing gum, mints and ice chips)  Sips of water am of surgery with allowed medications  Do not use chewing tobacco after midnight prior to surgery  May brush teeth do not swallow water  Do not smoke, drink alcoholic beverages or use any illicit drugs for 24 hours prior to surgery  Bring insurance info and photo ID  Bring pertinent paperwork with you from Doctor or surgeons's office  Wear clean comfortable, loose-fitting clothing  No make-up, nail polish, jewelry, piercings, or contact lenses to be worn day of surgery  No glue on dentures morning of surgery; you will be asked to remove them for surgery. Case for glasses.  Shower the night before and the morning of surgery with cleansing soap provided or a liquid antibacterial soap, dry with new fresh clean towel after each shower, no lotions, creams or powder.  Clean sheets and pillowcase on bed night before surgery  Bring medications in original bottles, Bring rescue inhalers with you  Bring CPAP/BIPAP machine if you have one ( you may be charged if one is needed in recovery room )    Our pharmacy has a Meds to Beds program where they will deliver any new prescriptions you may have to your room before you leave. Our Pharmacy will clear it through your insurance; for example (same co pay). This enables you to take your new RX as soon as you need when you get home and avoids stop/wait delays on the way home.  Please have a form of payment with you and have someone designated as your Pharmacy contact with their phone # as you may not feel well or still be under the influence of anesthesia.    Please refer to the SSI-Surgical Site Infection Flyer you hopefully received in the mail-together we can prevent infections; signs and symptoms reviewed.  When discharged be

## 2024-03-22 ENCOUNTER — HOSPITAL ENCOUNTER (OUTPATIENT)
Age: 39
Setting detail: OUTPATIENT SURGERY
Discharge: HOME | End: 2024-03-22
Attending: OTOLARYNGOLOGY | Admitting: OTOLARYNGOLOGY
Payer: COMMERCIAL

## 2024-03-22 ENCOUNTER — ANESTHESIA EVENT (OUTPATIENT)
Dept: OPERATING ROOM | Age: 39
End: 2024-03-22
Payer: COMMERCIAL

## 2024-03-22 ENCOUNTER — ANESTHESIA (OUTPATIENT)
Dept: OPERATING ROOM | Age: 39
End: 2024-03-22
Payer: COMMERCIAL

## 2024-03-22 VITALS
WEIGHT: 175 LBS | RESPIRATION RATE: 16 BRPM | TEMPERATURE: 96.7 F | SYSTOLIC BLOOD PRESSURE: 132 MMHG | BODY MASS INDEX: 27.47 KG/M2 | HEIGHT: 67 IN | OXYGEN SATURATION: 97 % | HEART RATE: 87 BPM | DIASTOLIC BLOOD PRESSURE: 73 MMHG

## 2024-03-22 DIAGNOSIS — J03.01 RECURRENT STREPTOCOCCAL TONSILLITIS: ICD-10-CM

## 2024-03-22 DIAGNOSIS — R06.83 SNORING: ICD-10-CM

## 2024-03-22 DIAGNOSIS — J35.01 CHRONIC TONSILLITIS: Primary | ICD-10-CM

## 2024-03-22 DIAGNOSIS — J03.91 RECURRENT TONSILLITIS: ICD-10-CM

## 2024-03-22 DIAGNOSIS — F17.200 SMOKER: ICD-10-CM

## 2024-03-22 DIAGNOSIS — R61 UNEXPLAINED NIGHT SWEATS: ICD-10-CM

## 2024-03-22 PROCEDURE — 6360000002 HC RX W HCPCS: Performed by: OTOLARYNGOLOGY

## 2024-03-22 PROCEDURE — 42826 REMOVAL OF TONSILS: CPT | Performed by: OTOLARYNGOLOGY

## 2024-03-22 PROCEDURE — 3600000002 HC SURGERY LEVEL 2 BASE: Performed by: OTOLARYNGOLOGY

## 2024-03-22 PROCEDURE — 3600000012 HC SURGERY LEVEL 2 ADDTL 15MIN: Performed by: OTOLARYNGOLOGY

## 2024-03-22 PROCEDURE — 6360000002 HC RX W HCPCS

## 2024-03-22 PROCEDURE — 3700000000 HC ANESTHESIA ATTENDED CARE: Performed by: OTOLARYNGOLOGY

## 2024-03-22 PROCEDURE — 2720000010 HC SURG SUPPLY STERILE: Performed by: OTOLARYNGOLOGY

## 2024-03-22 PROCEDURE — 3700000001 HC ADD 15 MINUTES (ANESTHESIA): Performed by: OTOLARYNGOLOGY

## 2024-03-22 PROCEDURE — 2709999900 HC NON-CHARGEABLE SUPPLY: Performed by: OTOLARYNGOLOGY

## 2024-03-22 PROCEDURE — 2500000003 HC RX 250 WO HCPCS: Performed by: NURSE ANESTHETIST, CERTIFIED REGISTERED

## 2024-03-22 PROCEDURE — 7100000001 HC PACU RECOVERY - ADDTL 15 MIN: Performed by: OTOLARYNGOLOGY

## 2024-03-22 PROCEDURE — 88304 TISSUE EXAM BY PATHOLOGIST: CPT

## 2024-03-22 PROCEDURE — 7100000000 HC PACU RECOVERY - FIRST 15 MIN: Performed by: OTOLARYNGOLOGY

## 2024-03-22 PROCEDURE — 6360000002 HC RX W HCPCS: Performed by: ANESTHESIOLOGY

## 2024-03-22 PROCEDURE — 6370000000 HC RX 637 (ALT 250 FOR IP): Performed by: OTOLARYNGOLOGY

## 2024-03-22 PROCEDURE — 6360000002 HC RX W HCPCS: Performed by: NURSE ANESTHETIST, CERTIFIED REGISTERED

## 2024-03-22 PROCEDURE — 7100000011 HC PHASE II RECOVERY - ADDTL 15 MIN: Performed by: OTOLARYNGOLOGY

## 2024-03-22 PROCEDURE — 2580000003 HC RX 258: Performed by: OTOLARYNGOLOGY

## 2024-03-22 PROCEDURE — 7100000010 HC PHASE II RECOVERY - FIRST 15 MIN: Performed by: OTOLARYNGOLOGY

## 2024-03-22 RX ORDER — PROPOFOL 10 MG/ML
INJECTION, EMULSION INTRAVENOUS PRN
Status: DISCONTINUED | OUTPATIENT
Start: 2024-03-22 | End: 2024-03-22 | Stop reason: SDUPTHER

## 2024-03-22 RX ORDER — SODIUM CHLORIDE 0.9 % (FLUSH) 0.9 %
5-40 SYRINGE (ML) INJECTION PRN
Status: CANCELLED | OUTPATIENT
Start: 2024-03-22

## 2024-03-22 RX ORDER — SUCCINYLCHOLINE/SOD CL,ISO/PF 200MG/10ML
SYRINGE (ML) INTRAVENOUS PRN
Status: DISCONTINUED | OUTPATIENT
Start: 2024-03-22 | End: 2024-03-22 | Stop reason: SDUPTHER

## 2024-03-22 RX ORDER — DEXAMETHASONE SODIUM PHOSPHATE 10 MG/ML
INJECTION, EMULSION INTRAMUSCULAR; INTRAVENOUS PRN
Status: DISCONTINUED | OUTPATIENT
Start: 2024-03-22 | End: 2024-03-22 | Stop reason: SDUPTHER

## 2024-03-22 RX ORDER — NALOXONE HYDROCHLORIDE 0.4 MG/ML
INJECTION, SOLUTION INTRAMUSCULAR; INTRAVENOUS; SUBCUTANEOUS PRN
Status: DISCONTINUED | OUTPATIENT
Start: 2024-03-22 | End: 2024-03-22 | Stop reason: HOSPADM

## 2024-03-22 RX ORDER — SODIUM CHLORIDE 9 MG/ML
INJECTION, SOLUTION INTRAVENOUS PRN
Status: DISCONTINUED | OUTPATIENT
Start: 2024-03-22 | End: 2024-03-22 | Stop reason: HOSPADM

## 2024-03-22 RX ORDER — FENTANYL CITRATE 50 UG/ML
INJECTION, SOLUTION INTRAMUSCULAR; INTRAVENOUS PRN
Status: DISCONTINUED | OUTPATIENT
Start: 2024-03-22 | End: 2024-03-22 | Stop reason: SDUPTHER

## 2024-03-22 RX ORDER — HYDROXYZINE HCL 10 MG/5 ML
10 SOLUTION, ORAL ORAL ONCE
Status: COMPLETED | OUTPATIENT
Start: 2024-03-22 | End: 2024-03-22

## 2024-03-22 RX ORDER — ROPIVACAINE HYDROCHLORIDE 5 MG/ML
INJECTION, SOLUTION EPIDURAL; INFILTRATION; PERINEURAL PRN
Status: DISCONTINUED | OUTPATIENT
Start: 2024-03-22 | End: 2024-03-22 | Stop reason: ALTCHOICE

## 2024-03-22 RX ORDER — SODIUM CHLORIDE 0.9 % (FLUSH) 0.9 %
5-40 SYRINGE (ML) INJECTION EVERY 12 HOURS SCHEDULED
Status: DISCONTINUED | OUTPATIENT
Start: 2024-03-22 | End: 2024-03-22 | Stop reason: HOSPADM

## 2024-03-22 RX ORDER — MIDAZOLAM HYDROCHLORIDE 1 MG/ML
INJECTION INTRAMUSCULAR; INTRAVENOUS PRN
Status: DISCONTINUED | OUTPATIENT
Start: 2024-03-22 | End: 2024-03-22 | Stop reason: SDUPTHER

## 2024-03-22 RX ORDER — ONDANSETRON 2 MG/ML
INJECTION INTRAMUSCULAR; INTRAVENOUS PRN
Status: DISCONTINUED | OUTPATIENT
Start: 2024-03-22 | End: 2024-03-22 | Stop reason: SDUPTHER

## 2024-03-22 RX ORDER — SODIUM CHLORIDE 0.9 % (FLUSH) 0.9 %
5-40 SYRINGE (ML) INJECTION PRN
Status: DISCONTINUED | OUTPATIENT
Start: 2024-03-22 | End: 2024-03-22 | Stop reason: HOSPADM

## 2024-03-22 RX ORDER — FENTANYL CITRATE 50 UG/ML
50 INJECTION, SOLUTION INTRAMUSCULAR; INTRAVENOUS EVERY 5 MIN PRN
Status: COMPLETED | OUTPATIENT
Start: 2024-03-22 | End: 2024-03-22

## 2024-03-22 RX ORDER — LIDOCAINE HCL/PF 100 MG/5ML
SYRINGE (ML) INJECTION PRN
Status: DISCONTINUED | OUTPATIENT
Start: 2024-03-22 | End: 2024-03-22 | Stop reason: SDUPTHER

## 2024-03-22 RX ORDER — FENTANYL CITRATE 50 UG/ML
INJECTION, SOLUTION INTRAMUSCULAR; INTRAVENOUS
Status: COMPLETED
Start: 2024-03-22 | End: 2024-03-22

## 2024-03-22 RX ORDER — HYDROXYZINE HCL 10 MG/5 ML
10 SOLUTION, ORAL ORAL EVERY 4 HOURS PRN
Qty: 200 ML | Refills: 0 | Status: SHIPPED | OUTPATIENT
Start: 2024-03-22

## 2024-03-22 RX ORDER — OXYMETAZOLINE HYDROCHLORIDE 0.05 G/100ML
SPRAY NASAL PRN
Status: DISCONTINUED | OUTPATIENT
Start: 2024-03-22 | End: 2024-03-22 | Stop reason: ALTCHOICE

## 2024-03-22 RX ORDER — SODIUM CHLORIDE 9 MG/ML
INJECTION, SOLUTION INTRAVENOUS PRN
Status: CANCELLED | OUTPATIENT
Start: 2024-03-22

## 2024-03-22 RX ORDER — ROCURONIUM BROMIDE 10 MG/ML
INJECTION, SOLUTION INTRAVENOUS PRN
Status: DISCONTINUED | OUTPATIENT
Start: 2024-03-22 | End: 2024-03-22 | Stop reason: SDUPTHER

## 2024-03-22 RX ORDER — SODIUM CHLORIDE 0.9 % (FLUSH) 0.9 %
5-40 SYRINGE (ML) INJECTION EVERY 12 HOURS SCHEDULED
Status: CANCELLED | OUTPATIENT
Start: 2024-03-22

## 2024-03-22 RX ADMIN — MIDAZOLAM 2 MG: 1 INJECTION INTRAMUSCULAR; INTRAVENOUS at 11:03

## 2024-03-22 RX ADMIN — ROCURONIUM BROMIDE 5 MG: 10 INJECTION INTRAVENOUS at 11:07

## 2024-03-22 RX ADMIN — FENTANYL CITRATE 50 MCG: 50 INJECTION INTRAMUSCULAR; INTRAVENOUS at 12:24

## 2024-03-22 RX ADMIN — Medication 100 MG: at 11:07

## 2024-03-22 RX ADMIN — ROCURONIUM BROMIDE 25 MG: 10 INJECTION INTRAVENOUS at 11:20

## 2024-03-22 RX ADMIN — SUGAMMADEX 200 MG: 100 INJECTION, SOLUTION INTRAVENOUS at 12:06

## 2024-03-22 RX ADMIN — HYDROCODONE BITARTRATE AND ACETAMINOPHEN 10 ML: 5; 217 SOLUTION ORAL at 13:53

## 2024-03-22 RX ADMIN — DEXAMETHASONE SODIUM PHOSPHATE 10 MG: 10 INJECTION, EMULSION INTRAMUSCULAR; INTRAVENOUS at 11:53

## 2024-03-22 RX ADMIN — Medication 10 MG: at 13:53

## 2024-03-22 RX ADMIN — FENTANYL CITRATE 100 MCG: 50 INJECTION, SOLUTION INTRAMUSCULAR; INTRAVENOUS at 11:07

## 2024-03-22 RX ADMIN — FENTANYL CITRATE 50 MCG: 50 INJECTION INTRAMUSCULAR; INTRAVENOUS at 12:37

## 2024-03-22 RX ADMIN — Medication 120 MG: at 11:07

## 2024-03-22 RX ADMIN — ONDANSETRON 4 MG: 2 INJECTION INTRAMUSCULAR; INTRAVENOUS at 11:50

## 2024-03-22 RX ADMIN — PROPOFOL 150 MG: 10 INJECTION, EMULSION INTRAVENOUS at 11:07

## 2024-03-22 RX ADMIN — SODIUM CHLORIDE: 9 INJECTION, SOLUTION INTRAVENOUS at 09:54

## 2024-03-22 ASSESSMENT — PAIN DESCRIPTION - LOCATION
LOCATION: THROAT
LOCATION: THROAT

## 2024-03-22 ASSESSMENT — PAIN DESCRIPTION - DESCRIPTORS
DESCRIPTORS: ACHING
DESCRIPTORS: SORE;BURNING
DESCRIPTORS: SORE
DESCRIPTORS: SORE
DESCRIPTORS: ACHING

## 2024-03-22 ASSESSMENT — PAIN SCALES - GENERAL
PAINLEVEL_OUTOF10: 8
PAINLEVEL_OUTOF10: 8

## 2024-03-22 ASSESSMENT — PAIN - FUNCTIONAL ASSESSMENT
PAIN_FUNCTIONAL_ASSESSMENT: NONE - DENIES PAIN
PAIN_FUNCTIONAL_ASSESSMENT: 0-10

## 2024-03-22 ASSESSMENT — LIFESTYLE VARIABLES: SMOKING_STATUS: 1

## 2024-03-22 NOTE — ANESTHESIA PRE PROCEDURE
the last 72 hours.    Coags: No results found for: \"PROTIME\", \"INR\", \"APTT\"    HCG (If Applicable):   Lab Results   Component Value Date    PREGTESTUR NEGATIVE 08/05/2019        ABGs: No results found for: \"PHART\", \"PO2ART\", \"YOU8WVL\", \"WIL2ZPD\", \"BEART\", \"X7NBSSQZ\"     Type & Screen (If Applicable):  Lab Results   Component Value Date    LABRH POS 08/05/2019       Drug/Infectious Status (If Applicable):  Lab Results   Component Value Date/Time    HEPCAB NEGATIVE 10/16/2013 12:42 PM       COVID-19 Screening (If Applicable):   Lab Results   Component Value Date/Time    COVID19 NOT DETECTED 08/02/2021 04:26 PM           Anesthesia Evaluation  Patient summary reviewed  Airway: Mallampati: II  TM distance: >3 FB   Neck ROM: full  Mouth opening: > = 3 FB   Dental:          Pulmonary:   (+)           asthma: current smoker          Patient did not smoke on day of surgery.                 Cardiovascular:                      Neuro/Psych:   (+) headaches:, psychiatric history:            GI/Hepatic/Renal:             Endo/Other:                     Abdominal:             Vascular:          Other Findings:       Anesthesia Plan      general     ASA 2       Induction: intravenous.    MIPS: Postoperative opioids intended and Prophylactic antiemetics administered.  Anesthetic plan and risks discussed with patient and spouse.      Plan discussed with JOSE.                Shakeel Moore DO   3/22/2024

## 2024-03-22 NOTE — DISCHARGE INSTRUCTIONS
HOME CARE DISCHARGE INSTRUCTIONS  AD ROMANO MD    Surgical Procedure: Tonsillectomy +/- Adenoidectomy    Bathing:   No restrictions    Food and Drink:  Regular diet, few restrictions except avoid acid, salty, or spicy. And no garlic or foods with hard sharp edges.   Encourage fluids to avoid dehydration.  May use Ensure to supplement caloric intake.   Do not use a straw for two weeks    Activity:  No strenuous activity for 2 weeks.  May return to school/work in 7 days assuming off narcotics.    Medications:  Continue all previous medications per doctor's original instructions.  Pain control is critical for good recovery after surgery.   Please do not hesitate to provide adequate pain control.  Pain medicine will be prescribed, usually both hydrocodone/tylenol liquid, (Hycet) and hydroxyzine, to be taken at the same time. The hydroxyzine enhances the pain relief of the Hycet, so that less narcotic is needed. Normally the dose and frequency needed becomes apparent fairly quickly. You should call your Dr if the pain relief is not adequate. When the pain decreases, usually about day 6, cut back and/or try switching to plain tylenol  To keep track, make a table with the meds at the top forming two columns, day and times down the left side, and fill in how many mL of each dose given  Do not take multiple acetaminophen-containing medications at the same time.  Cepacol anesthetic lozenges can be very helpful 15 to 20 minutes before taking the Hycet elixir.  Do not use more often than the package recommends.    Call the doctor if any of the following occurs:  Any significant bleeding.  It is normal to have slight bloody saliva for a few hours, but not clots or to spit up blood.  If you see this, go to [facility]'s emergency room and they will notify Dr. Romano or the Physician on call.  Temperature up to 101.5 F may be expected for a few days after surgery.  If this persists, or goes higher at any time, call.  Referred

## 2024-03-22 NOTE — OP NOTE
Operative Note      Patient: Denisse Hilliard  YOB: 1985  MRN: [id number]    Date of Procedure: 3/22/2024    Pre-Op Diagnosis Codes:     * Recurrent tonsillitis [J03.91]     * Snoring [R06.83]     * Chronic tonsillitis     * Unexplained night sweats [R61]     * Recurrent streptococcal tonsillitis [J03.01]     Post-Op Diagnosis: Same       Procedure(s):  TONSILLECTOMY     Surgeon(s):  Yaw Willson MD     Assistant:  * No surgical staff found *     Anesthesia: General     Estimated Blood Loss (mL): less than 100      Complications: None     Specimens:   ID Type Source Tests Collected by Time Destination   A : Right Tonsil Tissue Tonsil SURGICAL PATHOLOGY Yaw Willson MD 3/22/2024 1137     B : Left Tonsil Tissue Tonsil SURGICAL PATHOLOGY Yaw Willson MD 3/22/2024 1137           Implants:  * No implants in log *      Drains: * No LDAs found *     Findings:     Chronically infected tonsils with debris in the crypts bilaterally and severe scarring on the left side especially near the upper pole.  Ivanna of tonsil tissue infiltrated between the muscle fibers of the middle constrictor.  Adenoid bed was flat and smooth without inflammation or debris     Detailed Description of Procedure:     After an adequate level of general endotracheal anesthesia had been obtained, patient was draped in usual fashion for tonsillectomy.  Mouthgag was placed, soft palate was elevated with a #10 red Fried catheter, and findings were noted as above.    Adenoids were not enlarged or abnormal-appearing.    Tonsils were removed with dissection, low power electrocautery and the Medtronic Bizact device.  Hemostasis was assisted with pack, low power suction cautery, and Surgicel powder..  Plain ropivacaine was injected into the tonsillar fossae for postoperative pain relief.  Stomach was suctioned.    Mouthgag was released and reengaged.  Hemostasis was confirmed by close inspection and swiping the tonsil and adenoid  beds with the Yankauer suction tip.  Hemostasis appeared secure.  Mouthgag was removed.  The nose was decongested as needed with a few drops of oxymetazoline.    Patient was then returned to anesthesia's care, awakened, extubated and taken to recovery room in stable condition.  There were no complications and the patient tolerated the procedure well.     Electronically signed by AD ROMANO MD on 3/22/2024 at 7:39 PM

## 2024-03-22 NOTE — PROGRESS NOTES
1219-Patient to PACU-8. Placed on cardiac monitor. Report received from Abigail GARCIA and Emma BAÑUELOS. Patient tearful. Rates pain 8/10. Will medicate.   1224-patient continues to rate pain 8/10. Medicated with fentanyl per order. See MAR.  1238-Patient continues rate pain 8/10. Medicated with fentanyl per order. See MAR.  Vitals stable. Patient tolerating ice chips. Denies nausea.  1258- Patient meets criteria to move to [facility]. Pain improved.  1305-report given to [facility].

## 2024-03-22 NOTE — PROGRESS NOTES
Patient oriented to Same Day department and admitted to Same Day Surgery room 17.   Patient verbalized approval for first name, last initial with physician name on unit whiteboard.     Plan of care reviewed with patient.   Patient room whiteboard filled out and discussed with patient and responsible adult.   Patient and responsible adult offered Same Day Welcome Packet to review.    Call light in reach.   Bed in lowest position, locked, with one bed rail up.   SCDs and warming blanket in place.  Appropriate arm bands on patient.   Bathroom offered.   All questions and concerns of patient addressed.        Meds to Beds:   Patient informed of [facility] Meds to Beds program during admission. Patient is agreeable to program.   Contact information for the pharmacy and the Meds to Beds program:   Name: Akbar    Relationship to patient:HILARY   Phone number: [phone]

## 2024-03-22 NOTE — INTERVAL H&P NOTE
Pt Name: Denisse Hilliard  MRN: [id number]  YOB: 1985  Date of evaluation: 3/22/2024    I have examined the patient and reviewed the H&P/Consult and there are no changes to the patient or plans.         Electronically signed by AD ROMANO MD on 3/22/2024 at 11:01 AM

## 2024-03-22 NOTE — ANESTHESIA POSTPROCEDURE EVALUATION
Department of Anesthesiology  Postprocedure Note    Patient: Denisse Hilliard  MRN: [id number]  YOB: 1985  Date of evaluation: 3/22/2024    Procedure Summary       Date: 03/22/24 Room / Location: [facility] OR  / [facility] OR    Anesthesia Start: 1103 Anesthesia Stop: 1218    Procedure: TONSILLECTOMY (Mouth) Diagnosis:       Recurrent tonsillitis      Snoring      Smoker      Unexplained night sweats      Recurrent streptococcal tonsillitis      (Recurrent tonsillitis [J03.91])      (Snoring [R06.83])      (Smoker [F17.200])      (Unexplained night sweats [R61])      (Recurrent streptococcal tonsillitis [J03.01])    Surgeons: Yaw Willson MD Responsible Provider: Shakeel Moore DO    Anesthesia Type: General ASA Status: 2            Anesthesia Type: General    Marilee Phase I: Marilee Score: 9    Marilee Phase II:      Anesthesia Post Evaluation    Patient location during evaluation: PACU  Patient participation: complete - patient participated  Level of consciousness: awake and alert  Airway patency: patent  Nausea & Vomiting: no nausea  Cardiovascular status: blood pressure returned to baseline and hemodynamically stable  Respiratory status: acceptable and spontaneous ventilation  Hydration status: euvolemic  Pain management: adequate    No notable events documented.

## 2024-03-22 NOTE — H&P
AM    HGB 16.2 03/15/2024 10:53 AM    HCT 49.1 03/15/2024 10:53 AM     03/15/2024 10:53 AM    .4 03/15/2024 10:53 AM    MCH 34.1 03/15/2024 10:53 AM    MCHC 33.0 03/15/2024 10:53 AM    RDW 13.9 10/06/2021 08:22 AM    NRBC 0 03/15/2024 10:53 AM    SEGSPCT 50.4 03/15/2024 10:53 AM    LYMPHOPCT 25.0 10/06/2021 08:22 AM    MONOPCT 12.6 03/15/2024 10:53 AM    EOSPCT 2.9 10/06/2021 08:22 AM    BASOPCT 0.8 10/06/2021 08:22 AM    MONOSABS 0.7 03/15/2024 10:53 AM    LYMPHSABS 1.6 03/15/2024 10:53 AM    EOSABS 0.2 03/15/2024 10:53 AM    BASOSABS 0.0 03/15/2024 10:53 AM     BMP:    Lab Results   Component Value Date/Time     12/13/2023 03:47 PM    K 4.2 12/13/2023 03:47 PM    CL 98 12/13/2023 03:47 PM    CO2 22 12/13/2023 03:47 PM    BUN 7 12/13/2023 03:47 PM    LABALBU 4.0 12/13/2023 03:47 PM    CREATININE 0.6 12/13/2023 03:47 PM    CALCIUM 9.5 12/13/2023 03:47 PM    LABGLOM >60 12/13/2023 03:47 PM    GLUCOSE 96 12/13/2023 03:47 PM    GLUCOSE 90 10/06/2021 08:22 AM         CT Result (most recent):  CT SOFT TISSUE NECK W CONTRAST 12/13/2023    Narrative  PROCEDURE: CT SOFT TISSUE NECK W CONTRAST    CLINICAL INFORMATION: left tonsillar swelling, concern for pta..    COMPARISON: No prior study.    TECHNIQUE: 3 mm axial images were obtained through the neck soft tissues after the administration of intravenous contrast. Sagittal and coronal reconstructions were obtained.    All CT scans at this facility use dose modulation, iterative reconstruction, and/or weight-based dosing when appropriate to reduce radiation dose to as low as reasonably achievable.    FINDINGS:      There is mild enlargement of the adenoids..  There is enlargement of the left tonsil which measures 2.4 x 2.1 cm in size. There is enlargement of the right tonsil which measures 1.8 x 1.8 cm in size..   The hypopharynx is normal. The epiglottis is  normal.  The oral cavity and floor of mouth are normal.  The vocal cords and subglottic airway are  last year. On chart review patient was seen by family medicine provider in 2013 with concern for chronic night sweats.   Resolution of recent tonsillitis without any residual concerns noted today regarding recent infection. Reviewed guidelines regarding tonsillectomy and patient wishes to proceed with this. Will plan to recheck CBC to monitor progression of leukocytosis prior to upcoming surgery.   No history of cardiac/lung disorders or personal/family bleeding concerns or complications with anesthesia.  Return for scheduled surgery.  The risks, benefits, and alternatives to tonsillectomy have been discussed with the patient.The risks include but are not limited to post-operative bleeding requiring hospitalization and/or surgery, dehydration, pain, change in vocal resonance, pneumonia, halitosis, and recurrent throat infections. All questions were answered. The patient expressed understanding and decided to proceed accordingly.      (Please note that portions of this note may have been completed with a voice recognition program.  Efforts were made to edit the dictation but occasionally words are mis-transcribed.)    Electronically signed by AD ROMANO MD on 3/22/2024 at 12:01 AM

## 2024-03-22 NOTE — BRIEF OP NOTE
Brief Postoperative Note      Patient: Denisse Hilliard  YOB: 1985  MRN: [id number]    Date of Procedure: 3/22/2024    Pre-Op Diagnosis Codes:     * Recurrent tonsillitis [J03.91]     * Snoring [R06.83]     * Chronic tonsillitis     * Unexplained night sweats [R61]     * Recurrent streptococcal tonsillitis [J03.01]    Post-Op Diagnosis: Same       Procedure(s):  TONSILLECTOMY    Surgeon(s):  Yaw Willson MD    Assistant:  * No surgical staff found *    Anesthesia: General    Estimated Blood Loss (mL): less than 100     Complications: None    Specimens:   ID Type Source Tests Collected by Time Destination   A : Right Tonsil Tissue Tonsil SURGICAL PATHOLOGY Yaw Willson MD 3/22/2024 1137    B : Left Tonsil Tissue Tonsil SURGICAL PATHOLOGY Yaw Willson MD 3/22/2024 1137        Implants:  * No implants in log *      Drains: * No LDAs found *    Findings: Chronically infected tonsils with debris in the crypts bilaterally and severe scarring on the left side especially near the upper pole.  Ivanna of tonsil tissue infiltrated between the muscle fibers of the middle constrictor.  Adenoid bed was flat and smooth without inflammation or debris      Electronically signed by YAW WILLSON MD on 3/22/2024 at 12:24 PM

## 2024-04-15 ENCOUNTER — OFFICE VISIT (OUTPATIENT)
Dept: ENT CLINIC | Age: 39
End: 2024-04-15

## 2024-04-15 VITALS
DIASTOLIC BLOOD PRESSURE: 80 MMHG | RESPIRATION RATE: 18 BRPM | WEIGHT: 174.2 LBS | HEIGHT: 67 IN | OXYGEN SATURATION: 99 % | TEMPERATURE: 97.4 F | SYSTOLIC BLOOD PRESSURE: 120 MMHG | HEART RATE: 99 BPM | BODY MASS INDEX: 27.34 KG/M2

## 2024-04-15 DIAGNOSIS — Z90.89 S/P TONSILLECTOMY: Primary | ICD-10-CM

## 2024-04-15 PROCEDURE — 99024 POSTOP FOLLOW-UP VISIT: CPT | Performed by: NURSE PRACTITIONER

## 2024-04-15 RX ORDER — LORATADINE 5 MG/1
5 TABLET, CHEWABLE ORAL DAILY
COMMUNITY

## 2024-04-15 NOTE — PROGRESS NOTES
[facility] [place] [facility]  [facility] EAR, NOSE AND THROAT  [address]   [address]  [facility] [address]  Dept: [phone]  Dept Fax: [phone]  Loc: [phone]    HPI:     Denisse Hilliard is a 38 y.o. female here for post op check.  Patient is s/p tonsillectomy 3/22/2024 with Dr Willson for chronic tonsillitis, recurrent strep tonsillitis and snoring.  Patient reports a little bleeding around POD 8 when left side scab came off, but had no further bleeding after.  She is taking PO adequately, but still having some difficulty with meats and breads.  She is nervous to try swallowing pills for fear of getting stuck.  Some soreness in the tongue base yet and sensation of something feeling stuck.         History:     Allergies   Allergen Reactions    Augmentin [Amoxicillin-Pot Clavulanate] Swelling    Bee Venom Hives    Nicotine Swelling     patch    Bee Pollen Swelling and Rash     Current Outpatient Medications   Medication Sig Dispense Refill    loratadine (CLARITIN CHILDRENS) 5 MG chewable tablet Take 1 tablet by mouth daily      Dyclonine-Glycerin (CEPACOL SORE THROAT SPRAY) 0.1-33 % LIQD Use according to package directions for sore throat.  0    fluticasone (FLONASE) 50 MCG/ACT nasal spray instill 1 spray into each nostril once daily 32 g 1    acetaminophen (TYLENOL) 500 MG tablet Take 2 tablets by mouth every 6 hours as needed for Pain or Fever 180 tablet 0    albuterol sulfate HFA (PROVENTIL HFA) 108 (90 Base) MCG/ACT inhaler Inhale 2 puffs into the lungs every 6 hours as needed for Wheezing 1 Inhaler 3    hydrOXYzine (ATARAX) 10 MG/5ML syrup Take 5 mLs by mouth every 4 hours as needed (take with hydrocodone/Tylenol for pain) (Patient not taking: Reported on 4/15/2024) 200 mL 0    PARoxetine (PAXIL) 30 MG tablet Take 1 tablet by mouth daily (Patient not taking: Reported on 3/22/2024) 90 tablet 1    ibuprofen (ADVIL;MOTRIN) 600 MG tablet Take 1 tablet by mouth every 6 hours as

## 2024-06-04 ENCOUNTER — OFFICE VISIT (OUTPATIENT)
Dept: FAMILY MEDICINE CLINIC | Age: 39
End: 2024-06-04
Payer: COMMERCIAL

## 2024-06-04 VITALS
RESPIRATION RATE: 18 BRPM | DIASTOLIC BLOOD PRESSURE: 82 MMHG | OXYGEN SATURATION: 99 % | HEIGHT: 67 IN | HEART RATE: 85 BPM | BODY MASS INDEX: 29.51 KG/M2 | WEIGHT: 188 LBS | TEMPERATURE: 97.7 F | SYSTOLIC BLOOD PRESSURE: 122 MMHG

## 2024-06-04 DIAGNOSIS — M25.473 ANKLE SWELLING, UNSPECIFIED LATERALITY: ICD-10-CM

## 2024-06-04 DIAGNOSIS — Z91.09 ENVIRONMENTAL ALLERGIES: ICD-10-CM

## 2024-06-04 DIAGNOSIS — F41.9 ANXIETY: Primary | ICD-10-CM

## 2024-06-04 DIAGNOSIS — F43.10 PTSD (POST-TRAUMATIC STRESS DISORDER): ICD-10-CM

## 2024-06-04 DIAGNOSIS — M79.10 MYALGIA: ICD-10-CM

## 2024-06-04 PROCEDURE — 99214 OFFICE O/P EST MOD 30 MIN: CPT | Performed by: NURSE PRACTITIONER

## 2024-06-04 RX ORDER — NAPROXEN 375 MG/1
375 TABLET ORAL 2 TIMES DAILY WITH MEALS
Qty: 180 TABLET | Refills: 1 | Status: SHIPPED | OUTPATIENT
Start: 2024-06-04

## 2024-06-04 RX ORDER — LEVOCETIRIZINE DIHYDROCHLORIDE 5 MG/1
5 TABLET, FILM COATED ORAL NIGHTLY
Qty: 90 TABLET | Refills: 4 | Status: SHIPPED | OUTPATIENT
Start: 2024-06-04

## 2024-06-04 ASSESSMENT — ENCOUNTER SYMPTOMS
BACK PAIN: 0
GASTROINTESTINAL NEGATIVE: 1
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
## 2024-07-22 ENCOUNTER — OFFICE VISIT (OUTPATIENT)
Dept: FAMILY MEDICINE CLINIC | Age: 39
End: 2024-07-22
Payer: COMMERCIAL

## 2024-07-22 VITALS
HEIGHT: 67 IN | OXYGEN SATURATION: 98 % | DIASTOLIC BLOOD PRESSURE: 78 MMHG | TEMPERATURE: 98.2 F | RESPIRATION RATE: 16 BRPM | SYSTOLIC BLOOD PRESSURE: 126 MMHG | HEART RATE: 81 BPM | BODY MASS INDEX: 29.82 KG/M2 | WEIGHT: 190 LBS

## 2024-07-22 DIAGNOSIS — R53.83 OTHER FATIGUE: ICD-10-CM

## 2024-07-22 DIAGNOSIS — K52.9 ACUTE GASTROENTERITIS: Primary | ICD-10-CM

## 2024-07-22 DIAGNOSIS — R10.84 GENERALIZED ABDOMINAL PAIN: ICD-10-CM

## 2024-07-22 DIAGNOSIS — R19.7 DIARRHEA, UNSPECIFIED TYPE: ICD-10-CM

## 2024-07-22 LAB
BILIRUBIN, URINE: NEGATIVE
BLOOD URINE, POC: NEGATIVE
CHARACTER, URINE: CLEAR
COLOR, UA: YELLOW
GLUCOSE URINE: NEGATIVE MG/DL
KETONES, URINE: NEGATIVE
LEUKOCYTE CLUMPS, URINE: NEGATIVE
NITRITE, URINE: NEGATIVE
PH, URINE: 8.5 (ref 5–9)
PROTEIN, URINE: 30 MG/DL
SPECIFIC GRAVITY UA: 1.01 (ref 1–1.03)
UROBILINOGEN, URINE: 1 EU/DL (ref 0–1)

## 2024-07-22 PROCEDURE — 99214 OFFICE O/P EST MOD 30 MIN: CPT | Performed by: NURSE PRACTITIONER

## 2024-07-22 PROCEDURE — 81003 URINALYSIS AUTO W/O SCOPE: CPT | Performed by: NURSE PRACTITIONER

## 2024-07-22 RX ORDER — LOPERAMIDE HYDROCHLORIDE 2 MG/1
2 CAPSULE ORAL 4 TIMES DAILY PRN
Qty: 20 CAPSULE | Refills: 1 | Status: SHIPPED | OUTPATIENT
Start: 2024-07-22 | End: 2024-08-01

## 2024-07-22 RX ORDER — ONDANSETRON 4 MG/1
4 TABLET, ORALLY DISINTEGRATING ORAL 3 TIMES DAILY PRN
Qty: 21 TABLET | Refills: 0 | Status: SHIPPED | OUTPATIENT
Start: 2024-07-22

## 2024-07-22 ASSESSMENT — ENCOUNTER SYMPTOMS
CONSTIPATION: 0
ANAL BLEEDING: 0
DIARRHEA: 1
VOMITING: 1
BLOOD IN STOOL: 0
RESPIRATORY NEGATIVE: 1
ABDOMINAL PAIN: 1
RECTAL PAIN: 0
NAUSEA: 1
ABDOMINAL DISTENTION: 0

## 2024-07-22 NOTE — PROGRESS NOTES
hydrOXYzine (ATARAX) 10 MG/5ML syrup Take 5 mLs by mouth every 4 hours as needed (take with hydrocodone/Tylenol for pain) (Patient not taking: Reported on 2024) 200 mL 0    melatonin 3 MG TABS tablet Take 1 tablet by mouth nightly as needed (Insomnia) (Patient not taking: Reported on 4/15/2024) 30 tablet 5     No current facility-administered medications for this visit.       Past Medical History:   Diagnosis Date    Abnormal Pap smear and cervical HPV (human papillomavirus)     ADHD (attention deficit hyperactivity disorder)     Not diagnosed, just was always told I showed traits to havin    Anxiety     Asthma     Depression     Headache     PTSD (post-traumatic stress disorder)     Smoker     Substance abuse (HCC)     history of    Tonsillitis     recurrent      Past Surgical History:   Procedure Laterality Date    DENTAL SURGERY      FOOT SURGERY Right 2014    HYSTERECTOMY ABDOMINAL N/A 2019    ROBOTIC HYSTER W/ BS performed by Mariah Hansen MD at [facility] OR    PARTIAL HYSTERECTOMY (CERVIX NOT REMOVED)      TONSILLECTOMY N/A 3/22/2024    TONSILLECTOMY performed by Yaw Willson MD at [facility] OR    TUBAL LIGATION  2012     Family History   Problem Relation Age of Onset    Cancer Mother         ovarian in mother  , paternal grandmother- breast cancer    Alcohol Abuse Other          family    Breast Cancer Paternal Grandmother     Cancer Paternal Grandmother      Social History     Tobacco Use    Smoking status: Every Day     Current packs/day: 0.00     Average packs/day: 0.5 packs/day for 16.0 years (8.0 ttl pk-yrs)     Types: Cigarettes     Start date:      Last attempt to quit: 12/15/2015     Years since quittin.6    Smokeless tobacco: Never    Tobacco comments:     Not ready to quit smoking at this time   Substance Use Topics    Alcohol use: Yes     Alcohol/week: 2.0 standard drinks of alcohol     Types: 1 Cans of beer, 1 Drinks containing 0.5 oz of alcohol per

## 2024-07-24 ENCOUNTER — TELEPHONE (OUTPATIENT)
Dept: FAMILY MEDICINE CLINIC | Age: 39
End: 2024-07-24

## 2024-08-26 DIAGNOSIS — J01.00 ACUTE NON-RECURRENT MAXILLARY SINUSITIS: ICD-10-CM

## 2024-08-26 DIAGNOSIS — M79.10 MYALGIA: ICD-10-CM

## 2024-08-26 DIAGNOSIS — M25.473 ANKLE SWELLING, UNSPECIFIED LATERALITY: ICD-10-CM

## 2024-08-26 RX ORDER — FLUTICASONE PROPIONATE 50 MCG
1 SPRAY, SUSPENSION (ML) NASAL DAILY
Qty: 32 G | Refills: 1 | Status: SHIPPED | OUTPATIENT
Start: 2024-08-26

## 2024-08-26 NOTE — TELEPHONE ENCOUNTER
Recent Visits  Date Type Provider Dept   07/22/24 Office Visit Jean Marie White APRN - CNP [facility]   06/04/24 Office Visit Jean Marie White APRN - [facility]   03/05/24 Office Visit Jean Marie White APRN - [facility]   02/06/24 Office Visit Jean Marie White APRN - [facility]   12/15/23 Office Visit John Paul Tony APRN - [facility]NP [facility]   09/25/23 Office Visit Jean Marie White APRN - [facility]   Showing recent visits within past 540 days with a meds authorizing provider and meeting all other requirements  Future Appointments  Date Type Provider Dept   12/04/24 Appointment Jean Marie White APRN - CNP [facility]   Showing future appointments within next 150 days with a meds authorizing provider and meeting all other requirements

## 2024-09-18 ENCOUNTER — TELEPHONE (OUTPATIENT)
Dept: FAMILY MEDICINE CLINIC | Age: 39
End: 2024-09-18

## 2024-09-18 ENCOUNTER — HOSPITAL ENCOUNTER (OUTPATIENT)
Dept: GENERAL RADIOLOGY | Age: 39
Discharge: HOME | End: 2024-09-18

## 2024-09-18 ENCOUNTER — HOSPITAL ENCOUNTER (OUTPATIENT)
Age: 39
Discharge: HOME | End: 2024-09-18

## 2024-09-18 DIAGNOSIS — R52 PAIN: ICD-10-CM

## 2024-09-25 DIAGNOSIS — S46.012A STRAIN OF TENDON OF LEFT ROTATOR CUFF, INITIAL ENCOUNTER: Primary | ICD-10-CM

## 2024-10-14 RX ORDER — GABAPENTIN 300 MG/1
300 CAPSULE ORAL DAILY
Qty: 90 CAPSULE | OUTPATIENT
Start: 2024-10-14

## 2024-10-22 ENCOUNTER — HOSPITAL ENCOUNTER (OUTPATIENT)
Dept: OCCUPATIONAL THERAPY | Age: 39
Setting detail: THERAPIES SERIES
Discharge: HOME | End: 2024-10-22
Payer: COMMERCIAL

## 2024-10-22 PROCEDURE — 97110 THERAPEUTIC EXERCISES: CPT

## 2024-10-22 PROCEDURE — 97165 OT EVAL LOW COMPLEX 30 MIN: CPT

## 2024-10-22 NOTE — PROGRESS NOTES
washer to dryer, cannot lift items out of oven that require 2 hands   Bed Mobility     Transfers    Balance        Sensation Reports numbness down to her hand LUE   Coordination WFL for finger opposition to thumb left hand           LEFT UPPER EXTREMITY  RANGE OF MOTION    AROM PROM COMMENTS         Shoulder Flexion 28 35 Pt. Extremely painful for any movement of left shoulder both active and passive. Pt. Became tearful by end of session   Shoulder Extension 16     Shoulder Abduction 32 28    Shoulder Adduction      Shoulder External Rotation 23 12    Shoulder Internal Rotation Only getting hand to side of hip     Shoulder Range of Motion is WFL  []      Elbow Flexion      Elbow Extension      Forearm Pronation      Forearm Supination      Elbow Range of Motion is WFL  [x]      Wrist Flexion      Wrist Extension      Wrist Radial Deviation      Wrist Ulnar Deviation      Wrist Range of Motion is WFL  [x]   If no measurement is recorded, no formal assessment was completed for that motion.       LEFT UPPER EXTREMITY  STRENGTH    Strength Rating Comments   Shoulder Flexion  Did not attempt MMT due to poor ROM and pt.'s pain behaviors.    Shoulder Extension     Shoulder Abduction     Shoulder Adduction     Shoulder External Rotation     Shoulder Internal Rotation     []  Shoulder Strength is grossly WFL.      Elbow Flexion     Elbow Extension     Forearm Pronation     Forearm Supination     [] Elbow Strength is grossly WFL.      Wrist Flexion     Wrist Extension     Wrist Radial Deviation     Wrist Ulnar Deviation     []  Wrist Strength is grossly WFL.     Right Left    Strength Setting:      Pinch Strength Tip Pinch:      Lateral Pinch           If no ratings are recorded, no formal assessment was completed.        TREATMENT   Precautions:  Pt. Has a 5# weight restriction and is currently off work until Nov. 5    Pain: 7/10 pain left upper quadrant at rest; 10/10 with any movement     “X” in shaded column indicates

## 2024-10-24 ENCOUNTER — HOSPITAL ENCOUNTER (OUTPATIENT)
Dept: OCCUPATIONAL THERAPY | Age: 39
Setting detail: THERAPIES SERIES
Discharge: HOME | End: 2024-10-24
Payer: COMMERCIAL

## 2024-10-24 PROCEDURE — 97110 THERAPEUTIC EXERCISES: CPT

## 2024-10-24 NOTE — PROGRESS NOTES
[facility]  OCCUPATIONAL THERAPY  [] EVALUATION  [x] DAILY NOTE (LAND) [] DAILY NOTE (AQUATIC ) [] PROGRESS NOTE [] DISCHARGE NOTE    [x] OUTPATIENT REHABILITATION [facility]   [] [facility] CARE [place]    [] [facility]   [facility]    Date: 10/24/2024  Patient Name:  Denisse Hilliard  : 1985  MRN: [id number]  CSN: [id number]    Referring Practitioner Aleta Almaraz, APRN - CNP [id number]      Diagnosis Strain of muscle(s) and tendon(s) of the rotator cuff of left shoulder, initial encounter   Treatment Diagnosis M25.512  Left Shoulder Pain  M25.612 Stiffness of Left Shoulder  R53.1 Weakness   Date of Evaluation 10/22/24   Additional Pertinent History Denisse Hilliard has a past medical history of Abnormal Pap smear and cervical HPV (human papillomavirus), ADHD (attention deficit hyperactivity disorder), Anxiety, Asthma, Depression, Headache, PTSD (post-traumatic stress disorder), Smoker, Substance abuse (HCC), and Tonsillitis.  she has a past surgical history that includes Dental surgery; Tubal ligation (2012); Foot surgery (Right, 2014); HYSTERECTOMY ABDOMINAL (N/A, 2019); Partial hysterectomy (); and Tonsillectomy (N/A, 3/22/2024).     Allergies Allergies   Allergen Reactions    Augmentin [Amoxicillin-Pot Clavulanate] Swelling    Bee Venom Hives    Nicotine Swelling     patch    Bee Pollen Swelling and Rash      Medications   Current Outpatient Medications:     fluticasone (FLONASE) 50 MCG/ACT nasal spray, 1 spray by Each Nostril route daily, Disp: 32 g, Rfl: 1    naproxen (NAPROSYN) 375 MG tablet, Take 1 tablet by mouth 2 times daily (with meals), Disp: 180 tablet, Rfl: 1    ondansetron (ZOFRAN-ODT) 4 MG disintegrating tablet, Take 1 tablet by mouth 3 times daily as needed for Nausea or Vomiting, Disp: 21 tablet, Rfl: 0    levocetirizine (XYZAL) 5 MG tablet, Take 1 tablet by mouth nightly, Disp: 90 tablet, Rfl: 4

## 2024-10-30 ENCOUNTER — HOSPITAL ENCOUNTER (OUTPATIENT)
Dept: OCCUPATIONAL THERAPY | Age: 39
Setting detail: THERAPIES SERIES
Discharge: HOME | End: 2024-10-30
Payer: COMMERCIAL

## 2024-10-30 PROCEDURE — 97035 APP MDLTY 1+ULTRASOUND EA 15: CPT

## 2024-10-30 PROCEDURE — 97110 THERAPEUTIC EXERCISES: CPT

## 2024-10-30 NOTE — PROGRESS NOTES
[facility]  OCCUPATIONAL THERAPY  [] EVALUATION  [x] DAILY NOTE (LAND) [] DAILY NOTE (AQUATIC ) [] PROGRESS NOTE [] DISCHARGE NOTE    [x] OUTPATIENT REHABILITATION [facility]   [] [facility] CARE [place]    [] [facility]   [] [facility]    Date: 10/30/2024  Patient Name:  Denisse Hilliard  : 1985  MRN: [id number]  CSN: [id number]    Referring Practitioner Aleta Almaraz, APRN - CNP [id number]      Diagnosis Strain of muscle(s) and tendon(s) of the rotator cuff of left shoulder, initial encounter   Treatment Diagnosis M25.512  Left Shoulder Pain  M25.612 Stiffness of Left Shoulder  R53.1 Weakness   Date of Evaluation 10/22/24   Additional Pertinent History Denisse Hilliard has a past medical history of Abnormal Pap smear and cervical HPV (human papillomavirus), ADHD (attention deficit hyperactivity disorder), Anxiety, Asthma, Depression, Headache, PTSD (post-traumatic stress disorder), Smoker, Substance abuse (HCC), and Tonsillitis.  she has a past surgical history that includes Dental surgery; Tubal ligation (2012); Foot surgery (Right, 2014); HYSTERECTOMY ABDOMINAL (N/A, 2019); Partial hysterectomy (); and Tonsillectomy (N/A, 3/22/2024).     Allergies Allergies   Allergen Reactions    Augmentin [Amoxicillin-Pot Clavulanate] Swelling    Bee Venom Hives    Nicotine Swelling     patch    Bee Pollen Swelling and Rash      Medications   Current Outpatient Medications:     fluticasone (FLONASE) 50 MCG/ACT nasal spray, 1 spray by Each Nostril route daily, Disp: 32 g, Rfl: 1    naproxen (NAPROSYN) 375 MG tablet, Take 1 tablet by mouth 2 times daily (with meals), Disp: 180 tablet, Rfl: 1    ondansetron (ZOFRAN-ODT) 4 MG disintegrating tablet, Take 1 tablet by mouth 3 times daily as needed for Nausea or Vomiting, Disp: 21 tablet, Rfl: 0    levocetirizine (XYZAL) 5 MG tablet, Take 1 tablet by mouth nightly, Disp: 90 tablet, Rfl: 4

## 2024-11-04 ENCOUNTER — HOSPITAL ENCOUNTER (OUTPATIENT)
Dept: OCCUPATIONAL THERAPY | Age: 39
Setting detail: THERAPIES SERIES
Discharge: HOME | End: 2024-11-04
Payer: COMMERCIAL

## 2024-11-04 PROCEDURE — 97035 APP MDLTY 1+ULTRASOUND EA 15: CPT

## 2024-11-04 PROCEDURE — 97140 MANUAL THERAPY 1/> REGIONS: CPT

## 2024-11-04 PROCEDURE — 97110 THERAPEUTIC EXERCISES: CPT

## 2024-11-04 NOTE — PROGRESS NOTES
Education:   [x]  HEP/Education Completed: Plan of Care, Goals, discussed applying moist heat to left upper quadrant, then completing exercises/stretches, and then ending with ice  [employer]bridge  for HEP: scapular retraction, table slides, pendulums in all directions, cervical ROM (tilt head side to side, forward back, rotate side to side)  Exercise techniques, importance of moving her shoulder  []  No new Education completed  [x]  Reviewed Prior HEP      [x]  Patient verbalized and/or demonstrated understanding of education provided.  []  Patient unable to verbalize and/or demonstrate understanding of education provided.  Will continue education.  [x]  Barriers to learning: none    PLAN:  Treatment Recommendations: Strengthening, Range of Motion, Manual Therapy - Soft Tissue Mobilization, Home Exercise Program, Self-Care Education and Training, Integrative Dry Needling, and Modalities    []  Plan of care initiated.  Plan to see patient 2 times per week for 10 weeks to address the treatment planned outlined above.  [x]  Continue with current plan of care  []  Modify plan of care as follows:    []  Hold pending physician visit  []  Discharge    Time In 1000   Time Out 1040   Timed Code Minutes: 40 min   Total Treatment Time: 40 min       Electronically Signed by: Disha Crawley OTR/L, CHT #[id number]

## 2024-11-06 ENCOUNTER — HOSPITAL ENCOUNTER (OUTPATIENT)
Dept: OCCUPATIONAL THERAPY | Age: 39
Setting detail: THERAPIES SERIES
Discharge: HOME | End: 2024-11-06
Payer: COMMERCIAL

## 2024-11-06 PROCEDURE — 97035 APP MDLTY 1+ULTRASOUND EA 15: CPT

## 2024-11-06 NOTE — PROGRESS NOTES
physician visit  []  Discharge    Time In 0845   Time Out 0900   Timed Code Minutes: 15 min   Total Treatment Time: 15 min       Electronically Signed by: Yudi Gary, OTR/L [id number]

## 2024-11-12 ENCOUNTER — APPOINTMENT (OUTPATIENT)
Dept: OCCUPATIONAL THERAPY | Age: 39
End: 2024-11-12
Payer: COMMERCIAL

## 2024-11-14 ENCOUNTER — HOSPITAL ENCOUNTER (OUTPATIENT)
Dept: MRI IMAGING | Age: 39
Discharge: HOME | End: 2024-11-14
Attending: EMERGENCY MEDICINE
Payer: COMMERCIAL

## 2024-11-14 ENCOUNTER — APPOINTMENT (OUTPATIENT)
Dept: OCCUPATIONAL THERAPY | Age: 39
End: 2024-11-14
Payer: COMMERCIAL

## 2024-11-14 DIAGNOSIS — S16.1XXA STRAIN OF TENDON OF NECK, INITIAL ENCOUNTER: ICD-10-CM

## 2024-11-14 DIAGNOSIS — S46.812A STRAIN OF LEFT TRAPEZIUS MUSCLE, INITIAL ENCOUNTER: ICD-10-CM

## 2024-11-14 PROCEDURE — 72141 MRI NECK SPINE W/O DYE: CPT

## 2024-12-04 ENCOUNTER — OFFICE VISIT (OUTPATIENT)
Dept: FAMILY MEDICINE CLINIC | Age: 39
End: 2024-12-04

## 2024-12-04 VITALS
BODY MASS INDEX: 32.96 KG/M2 | HEIGHT: 67 IN | TEMPERATURE: 98.9 F | RESPIRATION RATE: 18 BRPM | WEIGHT: 210 LBS | HEART RATE: 101 BPM | SYSTOLIC BLOOD PRESSURE: 134 MMHG | DIASTOLIC BLOOD PRESSURE: 86 MMHG | OXYGEN SATURATION: 98 %

## 2024-12-04 DIAGNOSIS — F17.200 SMOKER: ICD-10-CM

## 2024-12-04 DIAGNOSIS — Z00.01 ENCOUNTER FOR GENERAL ADULT MEDICAL EXAMINATION WITH ABNORMAL FINDINGS: Primary | ICD-10-CM

## 2024-12-04 DIAGNOSIS — Z87.891 PERSONAL HISTORY OF TOBACCO USE, PRESENTING HAZARDS TO HEALTH: ICD-10-CM

## 2024-12-04 DIAGNOSIS — F43.10 PTSD (POST-TRAUMATIC STRESS DISORDER): ICD-10-CM

## 2024-12-04 DIAGNOSIS — F41.9 ANXIETY: ICD-10-CM

## 2024-12-04 DIAGNOSIS — J30.89 NON-SEASONAL ALLERGIC RHINITIS DUE TO OTHER ALLERGIC TRIGGER: ICD-10-CM

## 2024-12-04 DIAGNOSIS — F43.9 STRESS AT HOME: ICD-10-CM

## 2024-12-04 DIAGNOSIS — F41.0 PANIC: ICD-10-CM

## 2024-12-04 PROBLEM — J03.91 RECURRENT TONSILLITIS: Status: RESOLVED | Noted: 2024-03-22 | Resolved: 2024-12-04

## 2024-12-04 PROBLEM — J03.01 RECURRENT STREPTOCOCCAL TONSILLITIS: Status: RESOLVED | Noted: 2024-03-22 | Resolved: 2024-12-04

## 2024-12-04 PROBLEM — J30.9 ALLERGIC RHINITIS DUE TO ALLERGEN: Status: ACTIVE | Noted: 2024-12-04

## 2024-12-04 PROBLEM — J35.01 CHRONIC TONSILLITIS: Status: RESOLVED | Noted: 2024-03-22 | Resolved: 2024-12-04

## 2024-12-04 PROBLEM — R61 UNEXPLAINED NIGHT SWEATS: Status: RESOLVED | Noted: 2024-03-22 | Resolved: 2024-12-04

## 2024-12-04 RX ORDER — PAROXETINE 30 MG/1
30 TABLET, FILM COATED ORAL DAILY
Qty: 90 TABLET | Refills: 4 | Status: SHIPPED | OUTPATIENT
Start: 2024-12-04

## 2024-12-04 RX ORDER — HYDROXYZINE PAMOATE 25 MG/1
25 CAPSULE ORAL NIGHTLY
Qty: 90 CAPSULE | Refills: 1 | Status: SHIPPED | OUTPATIENT
Start: 2024-12-04 | End: 2025-01-03

## 2024-12-04 RX ORDER — PREDNISONE 10 MG/1
TABLET ORAL
COMMUNITY
Start: 2024-09-25

## 2024-12-04 RX ORDER — HYDROCODONE BITARTRATE AND ACETAMINOPHEN 5; 325 MG/1; MG/1
TABLET ORAL
COMMUNITY
Start: 2024-12-04 | End: 2024-12-11

## 2024-12-04 RX ORDER — CYCLOBENZAPRINE HCL 10 MG
TABLET ORAL
COMMUNITY
Start: 2024-09-25

## 2024-12-04 SDOH — ECONOMIC STABILITY: FOOD INSECURITY: WITHIN THE PAST 12 MONTHS, YOU WORRIED THAT YOUR FOOD WOULD RUN OUT BEFORE YOU GOT MONEY TO BUY MORE.: NEVER TRUE

## 2024-12-04 SDOH — ECONOMIC STABILITY: FOOD INSECURITY: WITHIN THE PAST 12 MONTHS, THE FOOD YOU BOUGHT JUST DIDN'T LAST AND YOU DIDN'T HAVE MONEY TO GET MORE.: NEVER TRUE

## 2024-12-04 SDOH — ECONOMIC STABILITY: INCOME INSECURITY: HOW HARD IS IT FOR YOU TO PAY FOR THE VERY BASICS LIKE FOOD, HOUSING, MEDICAL CARE, AND HEATING?: NOT HARD AT ALL

## 2024-12-04 ASSESSMENT — ENCOUNTER SYMPTOMS
RESPIRATORY NEGATIVE: 1
GASTROINTESTINAL NEGATIVE: 1

## 2024-12-04 NOTE — PROGRESS NOTES
when she was very young. none since.           Objective    Vital Signs  /86   Pulse (!) 101   Temp 98.9 °F (37.2 °C)   Resp 18   Ht 1.702 m (5' 7\")   Wt 95.3 kg (210 lb)   LMP 08/18/2019   SpO2 98%   BMI 32.89 kg/m²     Wt Readings from Last 3 Encounters:   12/04/24 95.3 kg (210 lb)   07/22/24 86.2 kg (190 lb)   06/04/24 85.3 kg (188 lb)       Physical Exam  Vitals and nursing note reviewed.   Constitutional:       Appearance: She is obese.   HENT:      Right Ear: Tympanic membrane, ear canal and external ear normal.      Left Ear: Tympanic membrane, ear canal and external ear normal.      Nose: Nose normal.      Mouth/Throat:      Mouth: Mucous membranes are moist.   Eyes:      Pupils: Pupils are equal, round, and reactive to light.   Cardiovascular:      Rate and Rhythm: Normal rate and regular rhythm.      Pulses: Normal pulses.      Heart sounds: Normal heart sounds. No murmur heard.  Pulmonary:      Effort: Pulmonary effort is normal. No respiratory distress.      Breath sounds: Normal breath sounds. No wheezing.   Abdominal:      General: Abdomen is flat. Bowel sounds are normal. There is no distension.      Palpations: Abdomen is soft.      Tenderness: There is no abdominal tenderness.   Musculoskeletal:      Comments: Holding left arm in 45 degree position  Decreased ROM neck due to pain    Skin:     General: Skin is warm and dry.   Neurological:      General: No focal deficit present.      Mental Status: She is alert and oriented to person, place, and time.   Psychiatric:         Mood and Affect: Mood normal.         Behavior: Behavior normal.         Thought Content: Thought content normal.         Judgment: Judgment normal.             Personalized Preventive Plan  Current Health Maintenance Status  Immunization History   Administered Date(s) Administered    TDaP, ADACEL (age 10y-64y), BOOSTRIX (age 10y+), IM, 0.5mL 04/28/2016        Health Maintenance Due   Topic Date Due    Pneumococcal

## 2025-03-28 DIAGNOSIS — J01.00 ACUTE NON-RECURRENT MAXILLARY SINUSITIS: ICD-10-CM

## 2025-03-31 DIAGNOSIS — J01.00 ACUTE NON-RECURRENT MAXILLARY SINUSITIS: ICD-10-CM

## 2025-03-31 RX ORDER — FLUTICASONE PROPIONATE 50 MCG
SPRAY, SUSPENSION (ML) NASAL
Qty: 32 G | Refills: 1 | Status: SHIPPED | OUTPATIENT
Start: 2025-03-31

## 2025-03-31 RX ORDER — FLUTICASONE PROPIONATE 50 MCG
SPRAY, SUSPENSION (ML) NASAL
Qty: 32 G | Refills: 1 | Status: SHIPPED | OUTPATIENT
Start: 2025-03-31 | End: 2025-03-31

## 2025-03-31 NOTE — TELEPHONE ENCOUNTER
Recent Visits  Date Type Provider Dept   12/04/24 Office Visit ChristopherJean Marie, APRN - CNP [facility]   07/22/24 Office Visit Christopher Jean Marie, APRN - CNP [facility]   06/04/24 Office Visit Christopher Jean Marie, APRN - [facility]   03/05/24 Office Visit Christopher Jean Marie, APRN - CNP [facility]   02/06/24 Office Visit Jean Marie White, APRN - CNP [facility]   12/15/23 Office Visit John Paul Tony, NACHO - CNP [facility]   Showing recent visits within past 540 days with a meds authorizing provider and meeting all other requirements  Future Appointments  No visits were found meeting these conditions.  Showing future appointments within next 150 days with a meds authorizing provider and meeting all other requirements

## 2025-04-21 ENCOUNTER — HOSPITAL ENCOUNTER (OUTPATIENT)
Age: 40
Discharge: HOME | End: 2025-04-21
Payer: COMMERCIAL

## 2025-04-21 ENCOUNTER — HOSPITAL ENCOUNTER (OUTPATIENT)
Dept: GENERAL RADIOLOGY | Age: 40
Discharge: HOME | End: 2025-04-21
Payer: COMMERCIAL

## 2025-04-21 DIAGNOSIS — Z01.818 PREOP EXAMINATION: ICD-10-CM

## 2025-04-21 LAB
ALBUMIN SERPL BCG-MCNC: 3.8 G/DL (ref 3.4–4.9)
ANION GAP SERPL CALC-SCNC: 8 MEQ/L (ref 8–16)
BASOPHILS ABSOLUTE: 0.1 THOU/MM3 (ref 0–0.1)
BASOPHILS NFR BLD AUTO: 1.2 %
BUN SERPL-MCNC: 9 MG/DL (ref 8–23)
CALCIUM SERPL-MCNC: 8.8 MG/DL (ref 8.6–10)
CHLORIDE SERPL-SCNC: 105 MEQ/L (ref 98–111)
CO2 SERPL-SCNC: 28 MEQ/L (ref 22–29)
CREAT SERPL-MCNC: 0.7 MG/DL (ref 0.5–0.9)
DEPRECATED RDW RBC AUTO: 51.2 FL (ref 35–45)
EKG ATRIAL RATE: 82 BPM
EKG P AXIS: 33 DEGREES
EKG P-R INTERVAL: 116 MS
EKG Q-T INTERVAL: 368 MS
EKG QRS DURATION: 68 MS
EKG QTC CALCULATION (BAZETT): 429 MS
EKG R AXIS: 73 DEGREES
EKG T AXIS: 70 DEGREES
EKG VENTRICULAR RATE: 82 BPM
EOSINOPHIL NFR BLD AUTO: 6.8 %
EOSINOPHILS ABSOLUTE: 0.5 THOU/MM3 (ref 0–0.4)
ERYTHROCYTE [DISTWIDTH] IN BLOOD BY AUTOMATED COUNT: 13.5 % (ref 11.5–14.5)
GFR SERPL CREATININE-BSD FRML MDRD: > 90 ML/MIN/1.73M2
GLUCOSE SERPL-MCNC: 71 MG/DL (ref 74–109)
HCT VFR BLD AUTO: 43.1 % (ref 37–47)
HGB BLD-MCNC: 14.4 GM/DL (ref 12–16)
IMM GRANULOCYTES # BLD AUTO: 0.03 THOU/MM3 (ref 0–0.07)
IMM GRANULOCYTES NFR BLD AUTO: 0.4 %
LYMPHOCYTES ABSOLUTE: 2.2 THOU/MM3 (ref 1–4.8)
LYMPHOCYTES NFR BLD AUTO: 29.2 %
MCH RBC QN AUTO: 34.3 PG (ref 26–33)
MCHC RBC AUTO-ENTMCNC: 33.4 GM/DL (ref 32.2–35.5)
MCV RBC AUTO: 102.6 FL (ref 81–99)
MONOCYTES ABSOLUTE: 0.6 THOU/MM3 (ref 0.4–1.3)
MONOCYTES NFR BLD AUTO: 7.9 %
NEUTROPHILS ABSOLUTE: 4.1 THOU/MM3 (ref 1.8–7.7)
NEUTROPHILS NFR BLD AUTO: 54.5 %
NRBC BLD AUTO-RTO: 0 /100 WBC
PLATELET # BLD AUTO: 409 THOU/MM3 (ref 130–400)
PMV BLD AUTO: 8.9 FL (ref 9.4–12.4)
POTASSIUM SERPL-SCNC: 4.5 MEQ/L (ref 3.5–5.2)
PREALB SERPL-MCNC: 16.8 MG/DL (ref 20–40)
RBC # BLD AUTO: 4.2 MILL/MM3 (ref 4.2–5.4)
SODIUM SERPL-SCNC: 141 MEQ/L (ref 135–145)
WBC # BLD AUTO: 7.5 THOU/MM3 (ref 4.8–10.8)

## 2025-04-21 PROCEDURE — 84134 ASSAY OF PREALBUMIN: CPT

## 2025-04-21 PROCEDURE — 82040 ASSAY OF SERUM ALBUMIN: CPT

## 2025-04-21 PROCEDURE — 80048 BASIC METABOLIC PNL TOTAL CA: CPT

## 2025-04-21 PROCEDURE — 87641 MR-STAPH DNA AMP PROBE: CPT

## 2025-04-21 PROCEDURE — 36415 COLL VENOUS BLD VENIPUNCTURE: CPT

## 2025-04-21 PROCEDURE — 85025 COMPLETE CBC W/AUTO DIFF WBC: CPT

## 2025-04-21 PROCEDURE — 93005 ELECTROCARDIOGRAM TRACING: CPT | Performed by: ORTHOPAEDIC SURGERY

## 2025-04-21 PROCEDURE — 71046 X-RAY EXAM CHEST 2 VIEWS: CPT

## 2025-04-22 LAB — MECA+MECC+MREJ ISLT/SPM QL: NEGATIVE

## 2025-04-29 ENCOUNTER — OFFICE VISIT (OUTPATIENT)
Dept: FAMILY MEDICINE CLINIC | Age: 40
End: 2025-04-29

## 2025-04-29 VITALS
HEIGHT: 67 IN | OXYGEN SATURATION: 99 % | HEART RATE: 106 BPM | RESPIRATION RATE: 18 BRPM | DIASTOLIC BLOOD PRESSURE: 82 MMHG | BODY MASS INDEX: 31.64 KG/M2 | SYSTOLIC BLOOD PRESSURE: 136 MMHG | WEIGHT: 201.6 LBS | TEMPERATURE: 98.3 F

## 2025-04-29 DIAGNOSIS — J30.89 NON-SEASONAL ALLERGIC RHINITIS DUE TO FUNGAL SPORES: ICD-10-CM

## 2025-04-29 DIAGNOSIS — Z01.818 PREOPERATIVE CLEARANCE: Primary | ICD-10-CM

## 2025-04-29 DIAGNOSIS — M54.12 CERVICAL RADICULOPATHY: ICD-10-CM

## 2025-04-29 DIAGNOSIS — F17.200 SMOKER: ICD-10-CM

## 2025-04-29 DIAGNOSIS — M50.20 PROTRUSION OF CERVICAL INTERVERTEBRAL DISC: ICD-10-CM

## 2025-04-29 SDOH — ECONOMIC STABILITY: FOOD INSECURITY: WITHIN THE PAST 12 MONTHS, YOU WORRIED THAT YOUR FOOD WOULD RUN OUT BEFORE YOU GOT MONEY TO BUY MORE.: NEVER TRUE

## 2025-04-29 SDOH — ECONOMIC STABILITY: FOOD INSECURITY: WITHIN THE PAST 12 MONTHS, THE FOOD YOU BOUGHT JUST DIDN'T LAST AND YOU DIDN'T HAVE MONEY TO GET MORE.: NEVER TRUE

## 2025-04-29 ASSESSMENT — PATIENT HEALTH QUESTIONNAIRE - PHQ9
SUM OF ALL RESPONSES TO PHQ QUESTIONS 1-9: 14
SUM OF ALL RESPONSES TO PHQ QUESTIONS 1-9: 14
9. THOUGHTS THAT YOU WOULD BE BETTER OFF DEAD, OR OF HURTING YOURSELF: NOT AT ALL
10. IF YOU CHECKED OFF ANY PROBLEMS, HOW DIFFICULT HAVE THESE PROBLEMS MADE IT FOR YOU TO DO YOUR WORK, TAKE CARE OF THINGS AT HOME, OR GET ALONG WITH OTHER PEOPLE: SOMEWHAT DIFFICULT
6. FEELING BAD ABOUT YOURSELF - OR THAT YOU ARE A FAILURE OR HAVE LET YOURSELF OR YOUR FAMILY DOWN: NOT AT ALL
2. FEELING DOWN, DEPRESSED OR HOPELESS: NEARLY EVERY DAY
SUM OF ALL RESPONSES TO PHQ QUESTIONS 1-9: 14
8. MOVING OR SPEAKING SO SLOWLY THAT OTHER PEOPLE COULD HAVE NOTICED. OR THE OPPOSITE, BEING SO FIGETY OR RESTLESS THAT YOU HAVE BEEN MOVING AROUND A LOT MORE THAN USUAL: NOT AT ALL
4. FEELING TIRED OR HAVING LITTLE ENERGY: NEARLY EVERY DAY
7. TROUBLE CONCENTRATING ON THINGS, SUCH AS READING THE NEWSPAPER OR WATCHING TELEVISION: SEVERAL DAYS
1. LITTLE INTEREST OR PLEASURE IN DOING THINGS: SEVERAL DAYS
5. POOR APPETITE OR OVEREATING: NEARLY EVERY DAY
3. TROUBLE FALLING OR STAYING ASLEEP: NEARLY EVERY DAY
SUM OF ALL RESPONSES TO PHQ QUESTIONS 1-9: 14

## 2025-04-29 NOTE — PROGRESS NOTES
and all orders for this visit:    Preoperative clearance  All preop testing reviewed and normal pt acceptable risk for surgery  Will fax clearance forms to [facility]   Pt in agreement with plan   Protrusion of cervical intervertebral disc    Cervical radiculopathy    Smoker    Non-seasonal allergic rhinitis due to fungal spores        Return if symptoms worsen or fail to improve.    Per 2014 ACC/AHA guidelines, patient may proceed with planned surgery.  Patient has a Yang Cardiac Risk Index score of 0 indicating low chance of perioperative cardiac complications.    Http://annals.org/article.aspx?[email or web address]  [email or web address]_[email or web address]

## 2025-05-22 ENCOUNTER — TELEPHONE (OUTPATIENT)
Dept: FAMILY MEDICINE CLINIC | Age: 40
End: 2025-05-22

## 2025-05-22 NOTE — TELEPHONE ENCOUNTER
Krystal Anthony RN from [facility] called to inform the office that patient had her ACDF with fusion and discectomy C6-T1 surgery on 5/5/25 and they were going to her house for drain management- which she is dc'd for that today. She wanted to let the provider know that her blood pressure has been going up and down and she is having extreme pain even with the slightest movement, also having some trouble with swallowing . She did have a follow up with [facility] on Tuesday and they are aware of these things.  BP was 179/110 HR 92 today so they waited over 20 minutes and rechecked and it was 185/117 HR 90.    Reports on 5/14 BP was 102/60  5/7 122/84       She just wanted to make the office aware, states to call patient with any directives

## 2025-05-22 NOTE — TELEPHONE ENCOUNTER
I suspect pain is causing the higher readings. I'm ok to con't to monitor for now. Please call back with update in 1 week.

## 2025-06-17 DIAGNOSIS — Z91.09 ENVIRONMENTAL ALLERGIES: ICD-10-CM

## 2025-06-17 RX ORDER — LEVOCETIRIZINE DIHYDROCHLORIDE 5 MG/1
TABLET, FILM COATED ORAL
Qty: 90 TABLET | Refills: 4 | Status: SHIPPED | OUTPATIENT
Start: 2025-06-17

## 2025-06-17 NOTE — TELEPHONE ENCOUNTER
Recent Visits  Date Type Provider Dept   04/29/25 Office Visit White, Jan, APRN - CNP [facility]   12/04/24 Office Visit White, Jan, APRN - CNP [facility]   07/22/24 Office Visit White, Jan, APRN - CNP [facility]   06/04/24 Office Visit White, Jan, APRN - CNP [facility]   03/05/24 Office Visit White, Jan, APRN - CNP [facility]   02/06/24 Office Visit Jean Marie White, NACHO - CNP [facility]   Showing recent visits within past 540 days with a meds authorizing provider and meeting all other requirements  Future Appointments  No visits were found meeting these conditions.  Showing future appointments within next 150 days with a meds authorizing provider and meeting all other requirements

## 2025-09-04 ENCOUNTER — HOSPITAL ENCOUNTER (OUTPATIENT)
Dept: INTERVENTIONAL RADIOLOGY/VASCULAR | Age: 40
Discharge: HOME | End: 2025-09-04
Payer: COMMERCIAL

## 2025-09-04 VITALS
DIASTOLIC BLOOD PRESSURE: 86 MMHG | RESPIRATION RATE: 18 BRPM | WEIGHT: 190 LBS | SYSTOLIC BLOOD PRESSURE: 148 MMHG | OXYGEN SATURATION: 98 % | TEMPERATURE: 96.9 F | HEART RATE: 88 BPM | HEIGHT: 67 IN | BODY MASS INDEX: 29.82 KG/M2

## 2025-09-04 DIAGNOSIS — M54.12 CERVICAL RADICULOPATHY: ICD-10-CM

## 2025-09-04 PROCEDURE — 6360000002 HC RX W HCPCS: Performed by: RADIOLOGY

## 2025-09-04 PROCEDURE — 62321 NJX INTERLAMINAR CRV/THRC: CPT

## 2025-09-04 PROCEDURE — 6360000004 HC RX CONTRAST MEDICATION: Performed by: RADIOLOGY

## 2025-09-04 PROCEDURE — 2500000003 HC RX 250 WO HCPCS: Performed by: RADIOLOGY

## 2025-09-04 PROCEDURE — 2709999900 IR INJ CERVICAL/THORACIC TRANSFORAMINAL EPIDURAL SINGLE LEVEL

## 2025-09-04 RX ORDER — IOPAMIDOL 408 MG/ML
1 INJECTION, SOLUTION INTRATHECAL ONCE
Status: DISCONTINUED | OUTPATIENT
Start: 2025-09-04 | End: 2025-09-05 | Stop reason: HOSPADM

## 2025-09-04 RX ORDER — HYDROXYZINE HYDROCHLORIDE 25 MG/1
25 TABLET, FILM COATED ORAL 3 TIMES DAILY PRN
COMMUNITY

## 2025-09-04 RX ORDER — WATER 10 ML/10ML
1 INJECTION INTRAMUSCULAR; INTRAVENOUS; SUBCUTANEOUS ONCE
Status: DISCONTINUED | OUTPATIENT
Start: 2025-09-04 | End: 2025-09-05 | Stop reason: HOSPADM

## 2025-09-04 RX ORDER — DEXAMETHASONE SODIUM PHOSPHATE 4 MG/ML
4 INJECTION, SOLUTION INTRA-ARTICULAR; INTRALESIONAL; INTRAMUSCULAR; INTRAVENOUS; SOFT TISSUE ONCE
Status: DISCONTINUED | OUTPATIENT
Start: 2025-09-04 | End: 2025-09-05 | Stop reason: HOSPADM

## 2025-09-04 RX ORDER — WATER 10 ML/10ML
INJECTION INTRAMUSCULAR; INTRAVENOUS; SUBCUTANEOUS PRN
Status: COMPLETED | OUTPATIENT
Start: 2025-09-04 | End: 2025-09-04

## 2025-09-04 RX ORDER — IOPAMIDOL 408 MG/ML
INJECTION, SOLUTION INTRATHECAL PRN
Status: COMPLETED | OUTPATIENT
Start: 2025-09-04 | End: 2025-09-04

## 2025-09-04 RX ORDER — HYDROCODONE BITARTRATE AND ACETAMINOPHEN 5; 325 MG/1; MG/1
1 TABLET ORAL EVERY 6 HOURS PRN
COMMUNITY

## 2025-09-04 RX ORDER — LIDOCAINE HYDROCHLORIDE 20 MG/ML
INJECTION, SOLUTION EPIDURAL; INFILTRATION; INTRACAUDAL; PERINEURAL PRN
Status: COMPLETED | OUTPATIENT
Start: 2025-09-04 | End: 2025-09-04

## 2025-09-04 RX ORDER — DEXAMETHASONE SODIUM PHOSPHATE 4 MG/ML
INJECTION, SOLUTION INTRA-ARTICULAR; INTRALESIONAL; INTRAMUSCULAR; INTRAVENOUS; SOFT TISSUE PRN
Status: COMPLETED | OUTPATIENT
Start: 2025-09-04 | End: 2025-09-04

## 2025-09-04 RX ORDER — CETIRIZINE HYDROCHLORIDE 10 MG/1
10 TABLET ORAL DAILY
COMMUNITY

## 2025-09-04 RX ADMIN — DEXAMETHASONE SODIUM PHOSPHATE 4 MG: 4 INJECTION, SOLUTION INTRAMUSCULAR; INTRAVENOUS at 11:18

## 2025-09-04 RX ADMIN — WATER 2 ML: 1 INJECTION INTRAMUSCULAR; INTRAVENOUS; SUBCUTANEOUS at 11:18

## 2025-09-04 RX ADMIN — LIDOCAINE HYDROCHLORIDE 2 ML: 20 INJECTION, SOLUTION EPIDURAL; INFILTRATION; INTRACAUDAL; PERINEURAL at 11:16

## 2025-09-04 RX ADMIN — IOPAMIDOL 1 ML: 408 INJECTION, SOLUTION INTRATHECAL at 11:17

## 2025-09-04 ASSESSMENT — PAIN DESCRIPTION - ORIENTATION
ORIENTATION: LEFT
ORIENTATION: RIGHT;LEFT

## 2025-09-04 ASSESSMENT — PAIN SCALES - GENERAL
PAINLEVEL_OUTOF10: 10
PAINLEVEL_OUTOF10: 8

## 2025-09-04 ASSESSMENT — PAIN DESCRIPTION - LOCATION
LOCATION: BACK;SHOULDER;ARM
LOCATION: SHOULDER;ARM;NECK

## 2025-09-04 ASSESSMENT — PAIN DESCRIPTION - DESCRIPTORS: DESCRIPTORS: STABBING;SPASM

## 2025-09-04 ASSESSMENT — PAIN DESCRIPTION - PAIN TYPE: TYPE: CHRONIC PAIN

## (undated) DEVICE — BASIC SINGLE BASIN BTC-LF: Brand: MEDLINE INDUSTRIES, INC.

## (undated) DEVICE — VCARE LARGE, UTERINE MANIPULATOR, VAGINAL-CERVICAL-AHLUWALIA'S-RETRACTOR-ELEVATOR: Brand: VCARE

## (undated) DEVICE — SOLUTION IV 1000ML 0.9% SOD CHL PH 5 INJ USP VIAFLX PLAS

## (undated) DEVICE — DEVICE TNSLCTMY OPN SEAL DIV BIZACT

## (undated) DEVICE — BLADELESS OBTURATOR: Brand: WECK VISTA

## (undated) DEVICE — 3M™ WARMING BLANKET, UPPER BODY, 10 PER CASE, 42268: Brand: BAIR HUGGER™

## (undated) DEVICE — GAUZE,SPONGE,8"X4",12PLY,XRAY,STRL,LF: Brand: MEDLINE

## (undated) DEVICE — PACK-MAJOR

## (undated) DEVICE — KIT,ANTI FOG,W/SPONGE & FLUID,SOFT PACK: Brand: MEDLINE

## (undated) DEVICE — SUTURE V-LOC 180 SZ 2-0 L12IN ABSRB VLT GS-21 L37MM 1/2 CIR VLOCM0315

## (undated) DEVICE — COLUMN DRAPE

## (undated) DEVICE — SYRINGE MED 10ML TRNSLUC BRL PLUNG BLK MRK POLYPR CTRL

## (undated) DEVICE — PACK PROCEDURE SURG GYN ROBOTIC

## (undated) DEVICE — GLOVE SURG SZ 65 THK91MIL LTX FREE SYN POLYISOPRENE

## (undated) DEVICE — ARM DRAPE

## (undated) DEVICE — PACK PROCEDURE SURG SET UP SRMC

## (undated) DEVICE — ELECTRO LUBE IS A SINGLE PATIENT USE DEVICE THAT IS INTENDED TO BE USED ON ELECTROSURGICAL ELECTRODES TO REDUCE STICKING.: Brand: KEY SURGICAL ELECTRO LUBE

## (undated) DEVICE — SUTURE VCRL SZ 4-0 L27IN ABSRB UD L19MM FS-2 3/8 CIR REV J422H

## (undated) DEVICE — MARKER,SKIN,WI/RULER AND LABELS: Brand: MEDLINE

## (undated) DEVICE — NEEDLE SPNL L3.5IN DIA25GA QNCKE TYP BVL SPINOCAN

## (undated) DEVICE — GLOVE SURG 7.5 PF POLYMER WHT STRL SIGN LTX ESSENTIAL LTX

## (undated) DEVICE — BANDAGE ADH W1XL3IN NAT FAB WVN FLX DURABLE N ADH PD SEAL

## (undated) DEVICE — VCARE SMALL, UTERINE MANIPULATOR, VAGINAL-CERVICAL-AHLUWALIA'S-RETRACTOR-ELEVATOR: Brand: VCARE

## (undated) DEVICE — PACK PROC LAP II AURORA

## (undated) DEVICE — YANKAUER,BULB TIP,W/O VENT,RIGID,STERILE: Brand: MEDLINE

## (undated) DEVICE — LINER SUCT CANSTR 1500CC SEMI RIG W/ POR HYDROPHOBIC SHUT

## (undated) DEVICE — AGENT HEMSTAT 3GM OXIDIZED REGENERATED CELOS ABSRB FOR CONT (ORDER MULTIPLES OF 5EA)

## (undated) DEVICE — CANNULA SEAL

## (undated) DEVICE — TRI-LUMEN FILTERED TUBE SET WITH ACTIVATED CHARCOAL FILTER: Brand: AIRSEAL

## (undated) DEVICE — GLOVE ORANGE PI 7   MSG9070

## (undated) DEVICE — SUTURE V-LOC 180 SZ 2-0 L9IN ABSRB VLT GS-21 L37MM 1/2 CIR VLOCM0345

## (undated) DEVICE — GOWN,SIRUS,NON REINFRCD,LARGE,SET IN SL: Brand: MEDLINE

## (undated) DEVICE — TUBING, SUCTION, 1/4" X 20', STRAIGHT: Brand: MEDLINE INDUSTRIES, INC.

## (undated) DEVICE — AIRSEAL 8 MM ACCESS PORT AND LOW PROFILE OBTURATOR WITH BLADELESS OPTICAL TIP, 120 MM LENGTH: Brand: AIRSEAL

## (undated) DEVICE — T&A: Brand: MEDLINE INDUSTRIES, INC.

## (undated) DEVICE — INSUFFLATION NEEDLE TO ESTABLISH PNEUMOPERITONEUM.: Brand: INSUFFLATION NEEDLE

## (undated) DEVICE — TIP COVER ACCESSORY

## (undated) DEVICE — COAGULATOR SUCT 10FR LAIN FTSWCH ACTIVATION DISP VALLEYLAB

## (undated) DEVICE — GLOVE SURG SZ 6 THK91MIL LTX FREE SYN POLYISOPRENE ANTI

## (undated) DEVICE — SYRINGE IRRIG 60ML SFT PLIABLE BLB EZ TO GRP 1 HND USE W/